# Patient Record
Sex: FEMALE | Race: WHITE | NOT HISPANIC OR LATINO | Employment: FULL TIME | URBAN - METROPOLITAN AREA
[De-identification: names, ages, dates, MRNs, and addresses within clinical notes are randomized per-mention and may not be internally consistent; named-entity substitution may affect disease eponyms.]

---

## 2017-01-04 ENCOUNTER — OFFICE VISIT (OUTPATIENT)
Dept: INTERNAL MEDICINE | Facility: CLINIC | Age: 62
End: 2017-01-04

## 2017-01-04 VITALS
OXYGEN SATURATION: 97 % | WEIGHT: 151.2 LBS | BODY MASS INDEX: 24.98 KG/M2 | SYSTOLIC BLOOD PRESSURE: 129 MMHG | DIASTOLIC BLOOD PRESSURE: 78 MMHG | HEART RATE: 72 BPM

## 2017-01-04 DIAGNOSIS — R05.9 COUGH: ICD-10-CM

## 2017-01-04 DIAGNOSIS — Z11.59 NEED FOR HEPATITIS C SCREENING TEST: Primary | ICD-10-CM

## 2017-01-04 RX ORDER — ALBUTEROL SULFATE 90 UG/1
2 AEROSOL, METERED RESPIRATORY (INHALATION) EVERY 6 HOURS PRN
Qty: 1 INHALER | Refills: 3 | Status: SHIPPED | OUTPATIENT
Start: 2017-01-04 | End: 2017-07-11

## 2017-01-04 ASSESSMENT — PAIN SCALES - GENERAL: PAINLEVEL: NO PAIN (0)

## 2017-01-04 NOTE — PROGRESS NOTES
"                     PRIMARY CARE CENTER       SUBJECTIVE:  Namita Hernandez is a 61 year old female with pmh of R sided breast cancer stage IA on tamoxifen, h/o benign thyroid nodule (biopsied)   following with Dr Alegria in onclogy who comes in for f/u with me today.  She is concerned about ongoing issues with a dry cough.  It is persistent and she wants to get this checked out.      Cough ongoing for one year and a half. Did have CT 4/2015 normal.  Cough is dry and irritating.  Rare sputum occ will feel a \"weakness in her chest.  When taking a deep breath will make her cough.  Other triggers include use of baby powder on chest, strong odors, pets.  Sometimes when lies down.  Nothing sees to make better.  Unsure what is going on with this.  No h/o asthma.  She does notice occ c/o hoarseness.  no weakness in voice.  No sinus sxs or drainage.  No c/o GERD sxs, no dysphagia.  She is not really endorsing SOB except when taking a really deep breath.  No changes in exercise tolerance.  She feels like that has been gradually improving.  Overall she feels like this part is better.  No pain or pressure in chest. No leg swelling.  No sneezing, itchy eyes.      ROS  A little more tired than in the past.   No weight loss.   No hemoptysis  No sputum production.     Medications and allergies reviewed by me today.     PMH  No h/o asthma  Does have some seasonal allergies.  Occ issues with pets (Dogs) with allergies.      FHx: father with COPD (emphysema) but he was a smoker.      Social:    Social History   Substance Use Topics     Smoking status: Former Smoker     Quit date: 01/01/1986     Smokeless tobacco: Never Used     Alcohol Use: Yes      Comment: 2 glasses of wine daily       Review Of Systems    10 point ROS of systems including Constitutional, Eyes, Respiratory, Cardiovascular, Pulmonary, Gastroenterology, Genitourinary, Integumentary, Musculoskeletal, Psychiatric were all negative except for pertinent positives noted in my " HPI.           OBJECTIVE:    /78 mmHg  Pulse 72  Wt 68.584 kg (151 lb 3.2 oz)  SpO2 97%  Breastfeeding? No   Wt Readings from Last 1 Encounters:   01/04/17 68.584 kg (151 lb 3.2 oz)       Constitutional: no distress, comfortable, pleasant   HEENT anicteric  PERRL  OP clear  Neck:  supple with full range of motion, no thyromegaly.   Nodes: no cervical, supraclavicular or axillary lad  Cardiovascular: regular rate and rhythm, normal S1 and S2, no murmurs, rubs or gallops,   Respiratory: Good air mvt.  Wheezing apically with forced expiration, otherwise no crackles.   Gastrointestinal: NT/ND  positive bowel sounds all four quadrants no hepatosplenomegaly, no masses   Ext:   no edema   ? Mild clubbing of digits clarissa.     CT of chest reviewed by me today from 4/15.      IMPRESSION:    1. Mild pectus excavatum.  2. 1 cm hypodense nodule right thyroid lobe. Recommend thyroid  ultrasound.     ASSESSMENT/PLAN:  Pt is a 61 year old female here for dry cough.  Ongoing for close to two years at this point.  CT of chest normal.  With apical inducible wheezes on examiantion ? Whether this could be a cough variant asthma.  Does have some typical triggers with cold, smells.  Rec PFTS with bronchidilator challenge, methachloline.  Trial of albuterol as well.    Pt agreeable with plan.   Namita was seen today for cough.    Diagnoses and all orders for this visit:    Need for hepatitis C screening test  -     Hepatitis C Screen Reflex to HCV RNA Quant and Genotype; Future    Cough  -     albuterol (PROAIR HFA/PROVENTIL HFA/VENTOLIN HFA) 108 (90 BASE) MCG/ACT Inhaler; Inhale 2 puffs into the lungs every 6 hours as needed for shortness of breath / dyspnea or wheezing (Patient taking differently: Inhale 2 puffs into the lungs every 6 hours as needed for shortness of breath / dyspnea or wheezing Will  today 1/5/17)  -     General PFT Lab (Please always keep checked); Future  -     Pulmonary Function Test; Future                 Ifeoma Amato MD

## 2017-01-04 NOTE — NURSING NOTE
Chief Complaint   Patient presents with     Cough     Patient states she has had a cough/ weakness in her chest x1 year.       Thelma Wu CMA at 2:55 PM on 1/4/2017.

## 2017-01-04 NOTE — PATIENT INSTRUCTIONS
Primary Care Center Medication Refill Request Information:  * Please contact your pharmacy regarding ANY request for medication refills.  ** [facility] Prescription Fax = [phone]  * Please allow 3 business days for routine medication refills.  * Please allow 5 business days for controlled substance medication refills.     Primary Care Center Test Result notification information:  *You will be notified with in 7-10 days of your appointment day regarding the results of your test.  If you are on MyChart you will be notified as soon as the provider has reviewed the results and signed off on them.    Pulmonary  [phone]

## 2017-01-04 NOTE — MR AVS SNAPSHOT
After Visit Summary   1/4/2017    Namita Hernandez    MRN: [id number]           Patient Information     Date Of Birth          1955        Visit Information        Provider Department      1/4/2017 2:55 PM Ifeoma Amato MD [facility]        Today's Diagnoses     Need for hepatitis C screening test    -  1     Cough           Care Instructions    Primary Care Center Medication Refill Request Information:  * Please contact your pharmacy regarding ANY request for medication refills.  ** [facility] Prescription Fax = [phone]  * Please allow 3 business days for routine medication refills.  * Please allow 5 business days for controlled substance medication refills.     Primary Care Center Test Result notification information:  *You will be notified with in 7-10 days of your appointment day regarding the results of your test.  If you are on MyChart you will be notified as soon as the provider has reviewed the results and signed off on them.    Pulmonary  [phone]          Follow-ups after your visit        Your next 10 appointments already scheduled     Jan 05, 2017  9:30 AM   LAB with [facility] ([facility])    [address] [place] [id number] [phone]           Patient must bring picture ID.  Patient should be prepared to give a urine specimen  Please do not eat 10-12 hours before your appointment if you are coming in fasting for labs on lipids, cholesterol, or glucose (sugar).  Pregnant women should follow their Care Team instructions. Water with medications is okay. Do not drink coffee or other fluids.   If you have concerns about taking  your medications, please ask at office or if scheduling via [employer], send a message by clicking on Secure Messaging, Message Your Care Team.            Jan 05, 2017 10:00 AM   (Arrive by 9:45 AM)   Return Visit with Jennifer Ty PA-C   [facility]  ([facility])    [address]  2nd Floor  [facility] [address]   [phone]            Jan 06, 2017  2:00 PM   FULL PULMONARY FUNCTION with  PFL C   [facility] Function Testing ([facility])    [address]  3rd Floor  [facility] [address]   [phone]            Mar 03, 2017  9:00 AM   Return Visit with Olena Luis MD   [facility] ([facility])    [facility]  1st Floor  [address] [address]   [phone]            Apr 03, 2017 10:00 AM   (Arrive by 9:45 AM)   Return Visit with Delphine Alegria MD   [facility] ([facility])    [address]  2nd Floor  [place] [address] [phone]              Future tests that were ordered for you today     Open Future Orders        Priority Expected Expires Ordered    General PFT Lab (Please always keep checked) Routine  1/4/2018 1/4/2017    Pulmonary Function Test Routine  1/4/2018 1/4/2017    Hepatitis C Screen Reflex to HCV RNA Quant and Genotype Routine 1/4/2017 1/4/2018 1/4/2017            Who to contact     Please call your clinic at [phone] to:    Ask questions about your health    Make or cancel appointments    Discuss your medicines    Learn about your test results    Speak to your doctor   If you have compliments or concerns about an experience at your clinic, or if you wish to file a complaint, please contact [facility] Patient Relations at [phone] or email us at [email or web address]         Additional Information About Your Visit        MyChart Information     [employer] gives you secure access to your electronic health record. If you see a primary care provider, you can also send messages to your care team and make appointments. If you have questions, please call your primary care clinic.  If you  do not have a primary care provider, please call [phone] and they will assist you.      [employer] is an electronic gateway that provides easy, online access to your medical records. With [employer], you can request a clinic appointment, read your test results, renew a prescription or communicate with your care team.     To access your existing account, please contact your [facility] or call [phone] for assistance.        Care EveryWhere ID     This is your Care EveryWhere ID. This could be used by other organizations to access your [facility] medical records  [id number]        Your Vitals Were     Pulse Pulse Oximetry Breastfeeding?             72 97% No          Blood Pressure from Last 3 Encounters:   01/04/17 129/78   10/03/16 142/85   08/05/16 132/74    Weight from Last 3 Encounters:   01/04/17 68.584 kg (151 lb 3.2 oz)   10/03/16 69.31 kg (152 lb 12.8 oz)   08/05/16 69.582 kg (153 lb 6.4 oz)                 Today's Medication Changes          These changes are accurate as of: 1/4/17  4:12 PM.  If you have any questions, ask your nurse or doctor.               Start taking these medicines.        Dose/Directions    albuterol 108 (90 BASE) MCG/ACT Inhaler   Commonly known as:  PROAIR HFA/PROVENTIL HFA/VENTOLIN HFA   Used for:  Cough   Started by:  Ifeoma Amato MD        Dose:  2 puff   Inhale 2 puffs into the lungs every 6 hours as needed for shortness of breath / dyspnea or wheezing   Quantity:  1 Inhaler   Refills:  3            Where to get your medicines      These medications were sent to [facility]/pharmacy #[id number] - [place], [place] - [address]  [address], [place] [address]     Phone:  [phone]    - albuterol 108 (90 BASE) MCG/ACT Inhaler             Primary Care Provider Office Phone # Fax #    Ifeoma Amato [phone]       [address] [address]        Thank you!     Thank you for choosing  [facility]  for your care. Our goal is always to provide you with excellent care. Hearing back from our patients is one way we can continue to improve our services. Please take a few minutes to complete the written survey that you may receive in the mail after your visit with us. Thank you!             Your Updated Medication List - Protect others around you: Learn how to safely use, store and throw away your medicines at www.disposemymeds.org.          This list is accurate as of: 1/4/17  4:12 PM.  Always use your most recent med list.                   Brand Name Dispense Instructions for use    albuterol 108 (90 BASE) MCG/ACT Inhaler    PROAIR HFA/PROVENTIL HFA/VENTOLIN HFA    1 Inhaler    Inhale 2 puffs into the lungs every 6 hours as needed for shortness of breath / dyspnea or wheezing       CALCIUM PO      Take 2 tablets by mouth daily       cholecalciferol 1000 UNIT tablet    vitamin D    30 tablet    Take by mouth daily       estradiol 10 MCG Tabs vaginal tablet    VAGIFEM    12 tablet    1 tablet three times weekly.       tamoxifen 20 MG tablet    NOLVADEX    90 tablet    Take 1 tablet (20 mg) by mouth daily       vitamin B complex with vitamin C Tabs tablet      Take 1 tablet by mouth daily       zolpidem 5 MG tablet    AMBIEN    60 tablet    Take 1 tablet (5 mg) by mouth nightly as needed for sleep

## 2017-01-05 ENCOUNTER — ONCOLOGY VISIT (OUTPATIENT)
Dept: ONCOLOGY | Facility: CLINIC | Age: 62
End: 2017-01-05
Attending: INTERNAL MEDICINE
Payer: COMMERCIAL

## 2017-01-05 VITALS
OXYGEN SATURATION: 98 % | DIASTOLIC BLOOD PRESSURE: 77 MMHG | WEIGHT: 151.1 LBS | SYSTOLIC BLOOD PRESSURE: 136 MMHG | RESPIRATION RATE: 16 BRPM | TEMPERATURE: 96.7 F | BODY MASS INDEX: 24.96 KG/M2 | HEART RATE: 72 BPM

## 2017-01-05 DIAGNOSIS — C50.911 MALIGNANT NEOPLASM OF RIGHT FEMALE BREAST, UNSPECIFIED SITE OF BREAST: Primary | ICD-10-CM

## 2017-01-05 DIAGNOSIS — Z11.59 NEED FOR HEPATITIS C SCREENING TEST: ICD-10-CM

## 2017-01-05 DIAGNOSIS — Z87.898 HISTORY OF CHRONIC COUGH: ICD-10-CM

## 2017-01-05 DIAGNOSIS — M81.0 OSTEOPOROSIS: ICD-10-CM

## 2017-01-05 LAB — HCV AB SERPL QL IA: NORMAL

## 2017-01-05 PROCEDURE — 99212 OFFICE O/P EST SF 10 MIN: CPT | Mod: ZF

## 2017-01-05 PROCEDURE — 99214 OFFICE O/P EST MOD 30 MIN: CPT | Mod: ZP | Performed by: PHYSICIAN ASSISTANT

## 2017-01-05 ASSESSMENT — PAIN SCALES - GENERAL: PAINLEVEL: NO PAIN (0)

## 2017-01-05 ASSESSMENT — ENCOUNTER SYMPTOMS
DYSPNEA ON EXERTION: 0
FATIGUE: 1
NIGHT SWEATS: 0
TASTE DISTURBANCE: 0
DECREASED APPETITE: 0
COUGH DISTURBING SLEEP: 0
HOARSE VOICE: 1
SNORES LOUDLY: 0
CHILLS: 0
FEVER: 0
POSTURAL DYSPNEA: 0
ALTERED TEMPERATURE REGULATION: 0
SINUS PAIN: 0
TROUBLE SWALLOWING: 0
COUGH: 1
NECK MASS: 0
SHORTNESS OF BREATH: 0
WHEEZING: 1
HALLUCINATIONS: 0
SORE THROAT: 0
SMELL DISTURBANCE: 0
WEIGHT GAIN: 0
INCREASED ENERGY: 0
SPUTUM PRODUCTION: 0
POLYPHAGIA: 0
WEIGHT LOSS: 0
RESPIRATORY PAIN: 0
SINUS CONGESTION: 0
HEMOPTYSIS: 0
POLYDIPSIA: 0

## 2017-01-05 NOTE — PROGRESS NOTES
"January 5, 2017    Namita returns today for followup of a stage I, T1b N0, ER/WY-positive and HER2-negative breast cancer.      HISTORY OF PRESENT ILLNESS:  Namita, \"Bridget" is a 61-year-old woman who comes in today for followup. Briefly, Namita underwent a mammogram and ultrasound.  Mammogram on 04/06/2015 suggested a 1.5 cm abnormal distortion confirmed by ultrasound.  By contrast mammography, this revealed an 18 mm area of abnormality at the 2 o'clock position involving her right breast.  Biopsy was recommended and confirmed an invasive ductal carcinoma, grade 1, ER/WY-positive and HER2-negative.  She underwent a lumpectomy and sentinel lymph node biopsy by Dr. Yoshi Oconnor.  Her final staging was a 6 mm tumor, T1b N0, ER/WY-positive and HER2-negative.  All margins were negative.      During Namita's workup, she was found to have a thyroid and parathyroid nodule.  She has an FNA performed of this that came back benign.      She completed her RTx (52.4 Gy in 20 sessions 6/31-7/27). She started tamoxifen in September 2015.      Ju feels well today. No new concerns. SHe did meet with Dr. Amato tomorrow for work-up of chornic, intermittent dry cough. This has been ongoing since before her breast surgery. A CT scan at that time showed no etiology. Since it has not gone away, she decided to get it worked up. There do seem to be environmental triggers. No GERD symptoms. No SOB, chest pain, or pleuritic pain. She has to cough when she inhales deeply sometimes. She will go days without symptoms and then it will come back for a few days. Presently without the cough. No leg swelling. Remains active without difficulty. She continues on vitamin D (she is on 5395-6803 IU daily) and calcium. She otherwise denies any new lumps/bumps, headaches, new vision changes, new pain, nausea, vomiting, bowel changes. She offers no further concerns today.     REVIEW OF SYSTEMS:  Her 10-point review of systems is otherwise negative.      PHYSICAL " EXAMINATION:   GENERAL:  She is well-appearing, in no apparent distress.   HEENT:  Exam reveals no icterus.  Oropharynx is clear.  There are no ulcers or lesions.   NECK:  Supple.  No cervical, supraclavicular or axillary adenopathy.   HEART:  Regular.   LUNGS:  Clear bilaterally.   ABDOMEN:  Soft, nontender and nondistended.   BREASTS:  Well-healed surgical scar in her right breast, right breast nipple contraction c/w left side, no mass or nodule palpated  SKIN: no rashes       ASSESSMENT AND PLAN:  This 61-year-old woman with a T1b N0, ER/AR-positive and HER2-negative invasive ductal carcinoma, grade 1, involving the right breast, status post lumpectomy and right breast RTx.    1.  Breast cancer.  Started tamoxifen Sept 2015. She is tolerating this well with occasional hot flashes. No signs of recurrence on exam today. She will follow-up with Dr. Alegria in 3 months. She will be due for mammogram in June 2017.  2. Osteoporosis: in her radius bone, L spine and neck showed osteopenia. Most recent DEXA 7/2015.  3. Thyroid nodule: seeing Dr. Rogers. She had biospy in November, consistent with benign nodule.  4. Chronic intermittent, dry cough: Ongoing since prior to breast surgery and not worsening. CT scan performed prior to surgery without any clear etiologies. She does think it is triggered by odors, pets, etc. No SOB or chest pain, no pleuritic pain, or swelling. She is seeing Dr. Amato for work-up and is getting PFTs tomorrow.       Jennifer Ty PA-C  Hematology, Oncology, and Transplant  [facility]  [address] [place] [id number]  [phone]

## 2017-01-05 NOTE — Clinical Note
"1/5/2017      RE: Namita Dodd [facility]  [address] [place] [address]       January 5, 2017    Namita returns today for followup of a stage I, T1b N0, ER/DE-positive and HER2-negative breast cancer.      HISTORY OF PRESENT ILLNESS:  Namita, \"Bridget" is a 61-year-old woman who comes in today for followup. Briefly, Namita underwent a mammogram and ultrasound.  Mammogram on 04/06/2015 suggested a 1.5 cm abnormal distortion confirmed by ultrasound.  By contrast mammography, this revealed an 18 mm area of abnormality at the 2 o'clock position involving her right breast.  Biopsy was recommended and confirmed an invasive ductal carcinoma, grade 1, ER/DE-positive and HER2-negative.  She underwent a lumpectomy and sentinel lymph node biopsy by Dr. Yoshi Oconnor.  Her final staging was a 6 mm tumor, T1b N0, ER/DE-positive and HER2-negative.  All margins were negative.      During Namita's workup, she was found to have a thyroid and parathyroid nodule.  She has an FNA performed of this that came back benign.      She completed her RTx (52.4 Gy in 20 sessions 6/31-7/27). She started tamoxifen in September 2015.      Ju feels well today. No new concerns. SHe did meet with Dr. Amato tomorrow for work-up of chornic, intermittent dry cough. This has been ongoing since before her breast surgery. A CT scan at that time showed no etiology. Since it has not gone away, she decided to get it worked up. There do seem to be environmental triggers. No GERD symptoms. No SOB, chest pain, or pleuritic pain. She has to cough when she inhales deeply sometimes. She will go days without symptoms and then it will come back for a few days. Presently without the cough. No leg swelling. Remains active without difficulty. She continues on vitamin D (she is on 0842-5059 IU daily) and calcium. She otherwise denies any new lumps/bumps, headaches, new vision changes, new pain, nausea, vomiting, bowel changes. She offers no further concerns today.     REVIEW OF " SYSTEMS:  Her 10-point review of systems is otherwise negative.      PHYSICAL EXAMINATION:   GENERAL:  She is well-appearing, in no apparent distress.   HEENT:  Exam reveals no icterus.  Oropharynx is clear.  There are no ulcers or lesions.   NECK:  Supple.  No cervical, supraclavicular or axillary adenopathy.   HEART:  Regular.   LUNGS:  Clear bilaterally.   ABDOMEN:  Soft, nontender and nondistended.   BREASTS:  Well-healed surgical scar in her right breast, right breast nipple contraction c/w left side, no mass or nodule palpated  SKIN: no rashes       ASSESSMENT AND PLAN:  This 61-year-old woman with a T1b N0, ER/NE-positive and HER2-negative invasive ductal carcinoma, grade 1, involving the right breast, status post lumpectomy and right breast RTx.    1.  Breast cancer.  Started tamoxifen Sept 2015. She is tolerating this well with occasional hot flashes. No signs of recurrence on exam today. She will follow-up with Dr. Alegria in 3 months. She will be due for mammogram in June 2017.  2. Osteoporosis: in her radius bone, L spine and neck showed osteopenia. Most recent DEXA 7/2015.  3. Thyroid nodule: seeing Dr. Rogers. She had biospy in November, consistent with benign nodule.  4. Chronic intermittent, dry cough: Ongoing since prior to breast surgery and not worsening. CT scan performed prior to surgery without any clear etiologies. She does think it is triggered by odors, pets, etc. No SOB or chest pain, no pleuritic pain, or swelling. She is seeing Dr. Amato for work-up and is getting PFTs tomorrow.       Jennifer Ty PA-C  Hematology, Oncology, and Transplant  [facility]  [address] [place] [address]  [phone]

## 2017-01-05 NOTE — NURSING NOTE
"Namita Hernandez is a 61 year old female who presents for:  Chief Complaint   Patient presents with     Oncology Clinic Visit     Return: Breast ca         Initial Vitals:  /77 mmHg  Pulse 72  Temp(Src) 96.7  F (35.9  C) (Tympanic)  Resp 16  Wt 68.539 kg (151 lb 1.6 oz)  SpO2 98% Estimated body mass index is 24.96 kg/(m^2) as calculated from the following:    Height as of 8/5/16: 1.657 m (5' 5.25\").    Weight as of this encounter: 68.539 kg (151 lb 1.6 oz).. There is no height on file to calculate BSA. BP completed using cuff size: regular  No Pain (0) No LMP recorded. Patient is postmenopausal. Allergies and medications reviewed.     Medications: Medication refills not needed today.  Pharmacy name entered into [employer]: [facility] #[id number] - [place], [place] - [address]    Comments: Patient received flu vaccine. See Immunizations.  Nicky Jacques CMA        6 minutes for nursing intake (face to face time)   Nicky Jacques CMA          "

## 2017-01-06 DIAGNOSIS — R05.9 COUGH: ICD-10-CM

## 2017-01-18 LAB
DLCOUNC-%PRED-PRE: 88 %
DLCOUNC-PRE: 19.6 ML/MIN/MMHG
DLCOUNC-PRED: 22.14 ML/MIN/MMHG
ERV-%PRED-PRE: 71 %
ERV-PRE: 0.62 L
ERV-PRED: 0.87 L
EXPTIME-PRE: 8.16 SEC
FEF2575-%PRED-PRE: 121 %
FEF2575-PRE: 2.8 L/SEC
FEF2575-PRED: 2.31 L/SEC
FEFMAX-%PRED-PRE: 115 %
FEFMAX-PRE: 7.51 L/SEC
FEFMAX-PRED: 6.48 L/SEC
FEV1-%PRED-PRE: 92 %
FEV1-PRE: 2.41 L
FEV1FEV6-PRE: 84 %
FEV1FEV6-PRED: 81 %
FEV1FVC-PRE: 84 %
FEV1FVC-PRED: 79 %
FEV1SVC-PRE: 83 %
FEV1SVC-PRED: 76 %
FIFMAX-PRE: 0.83 L/SEC
FRCPLETH-%PRED-PRE: 82 %
FRCPLETH-PRE: 2.3 L
FRCPLETH-PRED: 2.79 L
FVC-%PRED-PRE: 86 %
FVC-PRE: 2.86 L
FVC-PRED: 3.31 L
IC-%PRED-PRE: 89 %
IC-PRE: 2.29 L
IC-PRED: 2.57 L
RVPLETH-%PRED-PRE: 84 %
RVPLETH-PRE: 1.68 L
RVPLETH-PRED: 1.99 L
TLCPLETH-%PRED-PRE: 88 %
TLCPLETH-PRE: 4.6 L
TLCPLETH-PRED: 5.19 L
VA-%PRED-PRE: 80 %
VA-PRE: 4.29 L
VC-%PRED-PRE: 84 %
VC-PRE: 2.91 L
VC-PRED: 3.44 L

## 2017-02-14 DIAGNOSIS — C50.911 BREAST CANCER, RIGHT BREAST (H): ICD-10-CM

## 2017-02-14 RX ORDER — TAMOXIFEN CITRATE 20 MG/1
20 TABLET ORAL DAILY
Qty: 90 TABLET | Refills: 1 | Status: SHIPPED | OUTPATIENT
Start: 2017-02-14 | End: 2017-08-12

## 2017-02-14 NOTE — TELEPHONE ENCOUNTER
Tamoxifen 20 mg tablet      Last Written Prescription Date: 7/14/2016  Last Fill Quantity: 90,  # refills: 1   Last Office Visit with G, P or [facility] prescribing provider: 1/15/17                                             Refill request routed to Dr. Alegria.

## 2017-03-03 ENCOUNTER — OFFICE VISIT (OUTPATIENT)
Dept: RADIATION ONCOLOGY | Facility: CLINIC | Age: 62
End: 2017-03-03
Attending: RADIOLOGY
Payer: COMMERCIAL

## 2017-03-03 VITALS
DIASTOLIC BLOOD PRESSURE: 75 MMHG | SYSTOLIC BLOOD PRESSURE: 132 MMHG | HEART RATE: 71 BPM | OXYGEN SATURATION: 98 % | BODY MASS INDEX: 25.28 KG/M2 | WEIGHT: 153.1 LBS

## 2017-03-03 DIAGNOSIS — C50.911 MALIGNANT NEOPLASM OF RIGHT FEMALE BREAST, UNSPECIFIED SITE OF BREAST: Primary | ICD-10-CM

## 2017-03-03 PROCEDURE — 99212 OFFICE O/P EST SF 10 MIN: CPT | Performed by: RADIOLOGY

## 2017-03-03 ASSESSMENT — PAIN SCALES - GENERAL: PAINLEVEL: NO PAIN (0)

## 2017-03-03 NOTE — LETTER
3/3/2017       RE: Namita Hernandez  [address] [place] [address]     Dear Colleague,    Thank you for referring your patient, Namita Hernandez, to the [facility]. Please see a copy of my visit note below.    RADIATION ONCOLOGY FOLLOW-UP VISIT  DATE: 3/3/17    NAME: Namita Hernandez  MRN: [id number]  : 1955      DISEASE TREATED: Stage IA (pT1b N0 M0) invasive ductal carcinoma of the right breast, grade 1, ER/WI-positive, HER2 non amplified, s/p lumpectomy and SLN biopsy    RADIATION THERAPY DELIVERED: 5240 cGy in fractions (including 1000 cGy boost)    INTERVAL SINCE COMPLETION OF RT: Approximately 1.5 years since completion on 2015    SUBJECTIVE: Ms. Namita Hernandez is a 62 year old female lady with early stage cancer of the right breast status post lumpectomy and sentinel lymph node biopsy. She initially presented after palpating a lump in her inner upper quadrant of her right breast. Subsequent workup with mammogram and Us demonstrated a 1.3 x 1.5 x 1 cm solid hypoechoic mass in the 2:30 o'clock position 6 cm from the nipple as well as an 8 mm hypoechoic area 2.5 cm from the nipple. Both lesions were biopsied on that day with the tumor at 6 cm from nipple being positive for invasive ductal carcinoma, grade 1, ER/WI positive, HER-2 negative and the lesion 2.5 cm from nipple showing only atypical ductal hyperplasia, but no cancer. She then underwent a right sided lumpectomy and SLN biopsy on 2015. Pathology showed a 6 mm area of invasive ductal carcinoma surrounded by DCIS and LCIS. The invasive tumor was grade 1, and the closest invasive component was 0.25 cm from the margin and the closest DCIS was 0.25 cm from the margin. She had 0 out of 2 involved sentinel lymph nodes. She then saw Dr. Alegria and was started on Tamoxifen in July. Additionally, she underwent a course of adjuvant radiation therapy as outlined above. Overall, she tolerated her treatment course quite well with the  expected side effects of mild fatigue and skin reaction.     Since the completion of radiotherapy, Ms. Hernandez was started on Tamoxifen.  She has had a short interval when tamoxifen was stopped given the development of a UTI.  She most recently has surveillance on 6/14/16 with a diagnostic mammogram read as BI-RADS 2: benign findings. She has most recently seen Medical Oncology on 10/3/16 (Dr. Alegria) at which time she was recommended to continue tamoxifen.  She has also seen a plastic surgeon in consultation for possible improvement of sub-optimal cosmesis after lumpectomy.  She however has decided not to proceed with intervention at this time given that she does not believe the probability that procedure does produce satisfactory cosmesis is high enough to warrant the risk of operation.    Subjectively today the patient reports that she is doing well. We did discuss briefly a pain she has been having over her right TM joint for approximately 5 days which has resolved as of this morning.  After discussion, she does not wish for any additional workup in this regards. We also discussed some paroxysms of mild dyspnea which she has encountered for the past year.  She has been worked up for this and decided not to pursue additional testing or intervention. Her remaining 10 point ROS were negative.        PHYSICAL EXAM:  VITALS: /75 Pulse 71 Wt 69.4 kg (153 lb 1.6 oz) SpO2 98% BMI 25.28 kg/m2   GEN: Appears well, alert, oriented, and in NAD  HEENT: NCAT, EOMI, normal conjunctiva  CV:  warm and well-perfused  RESP: breathing comfortably on room air  BREAST: Status post right lumpectomy with well healed surgical scar. Noticeable asymmetric breasts with tissue deficit appreciated in the right breast in comparison to her left breast. There is moderate fibrosis of the right breast and right nipple is significantly contracted and pointed medially.  No masses or lumps palpated, no SCV or axillary LAD  SKIN: Normal color and  roquegor  NEURO: No focal deficits, normal gait  PSYCH: Appropriate mood and affect    IMPRESSION: Ms. Hernandez is a 62 year old female with stage IA invasive ductal carcinoma of the right breast status post lumpectomy and SLN biopsy followed by adjuvant radiation therapy and now continues on Tamoxifen. Overall, she is doing well and has recovered from the acute side effects associated with therapy. Cosmetically, there is noticeable asymmetry between the breasts which does cause Ms. Hernandez some distress.  She has been assessed by Plastic Surgery and understands that she may return for evaluation of cosmetic surgery    PLAN:  1. RTC in 1 year for routine followup  2. Continue close followup with Dr. Alegria in medical oncology as scheduled with monitoring imaging at her care  3. Continue Tamoxifen per Dr. Alegria    Ms. Hernandez was seen and discussed with staff, Dr. Luis.    Eber Luna MD  Radiation Oncology Resident, PGY-2  [facility]  Phone: [phone]    I saw and examined the patient with the resident.  I have reviewed and agree with the resident's note and plan of care.      Olena Luis MD      FOLLOW-UP VISIT    Patient Name: Namita Hernandez      : 1955     Age: 62 year old        ______________________________________________________________________________     Chief Complaint   Patient presents with     Cancer     Follow up for Breast ca: 5240 cGy completed 7/27/15     /75  Pulse 71  Wt 69.4 kg (153 lb 1.6 oz)  SpO2 98%  BMI 25.28 kg/m2     Date Radiation Completed: 7/25/15    Pain  Denies    Labs  Other Labs: No    Imaging  None          Other Appointments:     MD Name:  Appointment Date:    MD Name: Appointment Date:   MD Name: Appointment Date:   Other Appointment Notes:     Residual Radiation side effect: denies side effects     Additional Instructions:       Again, thank you for allowing me to participate in the care of your patient.      Sincerely,    Olena Luis  MD

## 2017-03-03 NOTE — PROGRESS NOTES
FOLLOW-UP VISIT    Patient Name: Namita Hernandez      : 1955     Age: 62 year old        ______________________________________________________________________________     Chief Complaint   Patient presents with     Cancer     Follow up for Breast ca: 5240 cGy completed 7/27/15     /75  Pulse 71  Wt 69.4 kg (153 lb 1.6 oz)  SpO2 98%  BMI 25.28 kg/m2     Date Radiation Completed: 7/25/15    Pain  Denies    Labs  Other Labs: No    Imaging  None          Other Appointments:     MD Name:  Appointment Date:    MD Name: Appointment Date:   MD Name: Appointment Date:   Other Appointment Notes:     Residual Radiation side effect: denies side effects     Additional Instructions:

## 2017-03-03 NOTE — MR AVS SNAPSHOT
After Visit Summary   3/3/2017    Namita Hernandez    MRN: [id number]           Patient Information     Date Of Birth          1955        Visit Information        Provider Department      3/3/2017 9:00 AM Olena Luis MD Rad[facility] Oncology Clinic        Today's Diagnoses     Malignant neoplasm of right female breast, unspecified site of breast (H)    -  1       Follow-ups after your visit        Your next 10 appointments already scheduled     Apr 03, 2017 10:00 AM CDT   (Arrive by 9:45 AM)   Return Visit with Delphine Alegria MD   [facility] ([facility])    [address] [address] [phone]              Who to contact     Please call your clinic at [phone] to:    Ask questions about your health    Make or cancel appointments    Discuss your medicines    Learn about your test results    Speak to your doctor   If you have compliments or concerns about an experience at your clinic, or if you wish to file a complaint, please contact [facility] Patient Relations at [phone] or email us at [email or web address]         Additional Information About Your Visit        MyChart Information     [employer] gives you secure access to your electronic health record. If you see a primary care provider, you can also send messages to your care team and make appointments. If you have questions, please call your primary care clinic.  If you do not have a primary care provider, please call [phone] and they will assist you.      [employer] is an electronic gateway that provides easy, online access to your medical records. With [employer], you can request a clinic appointment, read your test results, renew a prescription or communicate with your care team.     To access your existing account, please contact your [facility] or call [phone] for assistance.         Care EveryWhere ID     This is your Care EveryWhere ID. This could be used by other organizations to access your [place] medical records  [id number]        Your Vitals Were     Pulse Pulse Oximetry BMI (Body Mass Index)             71 98% 25.28 kg/m2          Blood Pressure from Last 3 Encounters:   03/03/17 132/75   01/05/17 136/77   01/04/17 129/78    Weight from Last 3 Encounters:   03/03/17 69.4 kg (153 lb 1.6 oz)   01/05/17 68.5 kg (151 lb 1.6 oz)   01/04/17 68.6 kg (151 lb 3.2 oz)              Today, you had the following     No orders found for display       Primary Care Provider Office Phone # Fax #    Ifeoma Amato [phone]       [address] [address]        Thank you!     Thank you for choosing [facility]  for your care. Our goal is always to provide you with excellent care. Hearing back from our patients is one way we can continue to improve our services. Please take a few minutes to complete the written survey that you may receive in the mail after your visit with us. Thank you!             Your Updated Medication List - Protect others around you: Learn how to safely use, store and throw away your medicines at www.disposemymeds.org.          This list is accurate as of: 3/3/17 11:59 PM.  Always use your most recent med list.                   Brand Name Dispense Instructions for use    albuterol 108 (90 BASE) MCG/ACT Inhaler    PROAIR HFA/PROVENTIL HFA/VENTOLIN HFA    1 Inhaler    Inhale 2 puffs into the lungs every 6 hours as needed for shortness of breath / dyspnea or wheezing       CALCIUM PO      Take 2-4 tablets by mouth daily       cholecalciferol 1000 UNIT tablet    vitamin D    30 tablet    Take by mouth daily       estradiol 10 MCG Tabs vaginal tablet    VAGIFEM    12 tablet    1 tablet three times weekly.       tamoxifen 20 MG tablet    NOLVADEX    90 tablet    Take 1 tablet (20 mg) by mouth daily        vitamin B complex with vitamin C Tabs tablet      Take 1 tablet by mouth daily       zolpidem 5 MG tablet    AMBIEN    60 tablet    Take 1 tablet (5 mg) by mouth nightly as needed for sleep

## 2017-03-03 NOTE — PROGRESS NOTES
RADIATION ONCOLOGY FOLLOW-UP VISIT  DATE: 3/3/17    NAME: Namita Hernandez  MRN: [id number]  : 1955      DISEASE TREATED: Stage IA (pT1b N0 M0) invasive ductal carcinoma of the right breast, grade 1, ER/CO-positive, HER2 non amplified, s/p lumpectomy and SLN biopsy    RADIATION THERAPY DELIVERED: 5240 cGy in fractions (including 1000 cGy boost)    INTERVAL SINCE COMPLETION OF RT: Approximately 1.5 years since completion on 2015    SUBJECTIVE: Ms. Namita Hernandez is a 62 year old female lady with early stage cancer of the right breast status post lumpectomy and sentinel lymph node biopsy. She initially presented after palpating a lump in her inner upper quadrant of her right breast. Subsequent workup with mammogram and Us demonstrated a 1.3 x 1.5 x 1 cm solid hypoechoic mass in the 2:30 o'clock position 6 cm from the nipple as well as an 8 mm hypoechoic area 2.5 cm from the nipple. Both lesions were biopsied on that day with the tumor at 6 cm from nipple being positive for invasive ductal carcinoma, grade 1, ER/CO positive, HER-2 negative and the lesion 2.5 cm from nipple showing only atypical ductal hyperplasia, but no cancer. She then underwent a right sided lumpectomy and SLN biopsy on 2015. Pathology showed a 6 mm area of invasive ductal carcinoma surrounded by DCIS and LCIS. The invasive tumor was grade 1, and the closest invasive component was 0.25 cm from the margin and the closest DCIS was 0.25 cm from the margin. She had 0 out of 2 involved sentinel lymph nodes. She then saw Dr. Alegria and was started on Tamoxifen in July. Additionally, she underwent a course of adjuvant radiation therapy as outlined above. Overall, she tolerated her treatment course quite well with the expected side effects of mild fatigue and skin reaction.     Since the completion of radiotherapy, Ms. Hernandez was started on Tamoxifen.  She has had a short interval when tamoxifen was stopped given the development of a UTI.  She most  recently has surveillance on 6/14/16 with a diagnostic mammogram read as BI-RADS 2: benign findings. She has most recently seen Medical Oncology on 10/3/16 (Dr. Alegria) at which time she was recommended to continue tamoxifen.  She has also seen a plastic surgeon in consultation for possible improvement of sub-optimal cosmesis after lumpectomy.  She however has decided not to proceed with intervention at this time given that she does not believe the probability that procedure does produce satisfactory cosmesis is high enough to warrant the risk of operation.    Subjectively today the patient reports that she is doing well. We did discuss briefly a pain she has been having over her right TM joint for approximately 5 days which has resolved as of this morning.  After discussion, she does not wish for any additional workup in this regards. We also discussed some paroxysms of mild dyspnea which she has encountered for the past year.  She has been worked up for this and decided not to pursue additional testing or intervention. Her remaining 10 point ROS were negative.        PHYSICAL EXAM:  VITALS: /75 Pulse 71 Wt 69.4 kg (153 lb 1.6 oz) SpO2 98% BMI 25.28 kg/m2   GEN: Appears well, alert, oriented, and in NAD  HEENT: NCAT, EOMI, normal conjunctiva  CV:  warm and well-perfused  RESP: breathing comfortably on room air  BREAST: Status post right lumpectomy with well healed surgical scar. Noticeable asymmetric breasts with tissue deficit appreciated in the right breast in comparison to her left breast. There is moderate fibrosis of the right breast and right nipple is significantly contracted and pointed medially.  No masses or lumps palpated, no SCV or axillary LAD  SKIN: Normal color and turgor  NEURO: No focal deficits, normal gait  PSYCH: Appropriate mood and affect    IMPRESSION: Ms. Hernandez is a 62 year old female with stage IA invasive ductal carcinoma of the right breast status post lumpectomy and SLN biopsy  followed by adjuvant radiation therapy and now continues on Tamoxifen. Overall, she is doing well and has recovered from the acute side effects associated with therapy. Cosmetically, there is noticeable asymmetry between the breasts which does cause Ms. Hernandez some distress.  She has been assessed by Plastic Surgery and understands that she may return for evaluation of cosmetic surgery    PLAN:  1. RTC in 1 year for routine followup  2. Continue close followup with Dr. Alegria in medical oncology as scheduled with monitoring imaging at her care  3. Continue Tamoxifen per Dr. Alegria    Ms. Hernandez was seen and discussed with staff, Dr. Luis.    Eber Luna MD  Radiation Oncology Resident, PGY-2  [facility]  Phone: [phone]    I saw and examined the patient with the resident.  I have reviewed and agree with the resident's note and plan of care.      Olena Luis MD

## 2017-04-03 ENCOUNTER — ONCOLOGY VISIT (OUTPATIENT)
Dept: ONCOLOGY | Facility: CLINIC | Age: 62
End: 2017-04-03
Attending: INTERNAL MEDICINE
Payer: COMMERCIAL

## 2017-04-03 VITALS
OXYGEN SATURATION: 96 % | BODY MASS INDEX: 25.41 KG/M2 | WEIGHT: 152.5 LBS | TEMPERATURE: 97.6 F | HEIGHT: 65 IN | DIASTOLIC BLOOD PRESSURE: 79 MMHG | SYSTOLIC BLOOD PRESSURE: 121 MMHG | RESPIRATION RATE: 12 BRPM | HEART RATE: 82 BPM

## 2017-04-03 DIAGNOSIS — C50.911 MALIGNANT NEOPLASM OF RIGHT FEMALE BREAST, UNSPECIFIED SITE OF BREAST: Primary | ICD-10-CM

## 2017-04-03 PROCEDURE — 99214 OFFICE O/P EST MOD 30 MIN: CPT | Mod: GC | Performed by: INTERNAL MEDICINE

## 2017-04-03 PROCEDURE — 99212 OFFICE O/P EST SF 10 MIN: CPT | Mod: ZF

## 2017-04-03 ASSESSMENT — PAIN SCALES - GENERAL: PAINLEVEL: NO PAIN (0)

## 2017-04-03 NOTE — PROGRESS NOTES
"MEDICAL ONCOLOGY FOLLOW UP VISIT    April 3, 2017    Namita returns today for followup of a stage I, T1b N0, ER/NH-positive and HER2-negative breast cancer.      HISTORY OF PRESENT ILLNESS: Namita is a 62-year-old woman who comes in today for followup for her breast cancer. Please see prior notes by Dr. Alegria for further detail (06/01/2015).  Briefly, Namita underwent a mammogram and ultrasound.  Mammogram on 04/06/2015 suggested a 1.5 cm abnormal distortion confirmed by ultrasound.  By contrast mammography, this revealed an 18 mm area of abnormality at the 2 o'clock position involving her right breast.  Biopsy was recommended and confirmed an invasive ductal carcinoma, grade 1, ER/NH-positive and HER2-negative.  She underwent a lumpectomy and sentinel lymph node biopsy by Dr. Yoshi Oconnor.  Her final staging was a 6 mm tumor, T1b N0, ER/NH-positive and HER2-negative.  All margins were negative. She completed her RTx (52.4 Gy in 20 sessions 6/31-7/27). She started Tamoxifen in 09/2015.     INTERVAL HISTORY: She returns today in follow up and feels quite well. She remains on Tamoxifen and tolerates it without major problems. She has some hot flashes but these are manageable. She has no joint aches or pains. She has no vaginal bleeding. Her mood is good. She has had no fevers, chills, or chest pain. She exercises regularly, she took up running but would get neck pain when she ran, it is not present any more. Her appetite is good and her energy level is good. Since her last visit she did see a plastic surgeon to discuss potential fat grafting of her right breast.    REVIEW OF SYSTEMS:  Her 10-point review of systems is otherwise negative.      PHYSICAL EXAMINATION: /79  Pulse 82  Temp 97.6  F (36.4  C) (Oral)  Resp 12  Ht 1.663 m (5' 5.47\")  Wt 69.2 kg (152 lb 8 oz)  SpO2 96%  BMI 25.01 kg/m2    GENERAL:  She is well-appearing, in no apparent distress.   HEENT:  Exam reveals no icterus.  Oropharynx is clear.  " There are no ulcers or lesions.   NECK:  Supple.  No cervical, supraclavicular or axillary adenopathy.   HEART:  Regular.   LUNGS:  Clear bilaterally.   ABDOMEN:  Soft, nontender and nondistended.   BREASTS:  Well-healed surgical scar in her right breast with underlying scar tissue, right breast nipple contraction c/w left side, no mass or nodule palpated  SKIN: no rashes      No new labs today.    ASSESSMENT AND PLAN:  This 62-year-old woman with a T1b N0, ER/RI-positive and HER2-negative invasive ductal carcinoma, grade 1, involving the right breast, status post lumpectomy and right breast RTx. She is now on Tamoxifen for adjuvant endocrine therapy.    1.  Breast cancer.  [id number], stage IA, start tamoxifen Sept 2015 which she is on due to the results of her DEXA scan showing osteoporosis. We will plan to continue Tamoxifen at its current dosing. We will repeat her DEXA in June to see how her bone health is and consider switching to an aromatase inhibotor. She is due for a mammogram in June as well.      2. Osteoporosis: in her radius bone, L spine and neck showed osteopenia. We will repeat DEXA this summer. We will consider bisphosphonate at if we switch her to an aromatase inhibitor.     RTC in 3 months.     Patient seen and discussed with Dr. Alegria.    Moises Sheffield MD  Heme/Onc Fellow    Pt was seen and evaluated by me.  Tolerating tamoxifen well. Continue current plan.    Delphine Alegria MD

## 2017-04-03 NOTE — LETTER
4/3/2017       RE: Namita Hernandez  [address] [place] [address]     Dear Colleague,    Thank you for referring your patient, Namita Hernandez, to the [facility]. Please see a copy of my visit note below.    MEDICAL ONCOLOGY FOLLOW UP VISIT    April 3, 2017    Namita returns today for followup of a stage I, T1b N0, ER/IN-positive and HER2-negative breast cancer.      HISTORY OF PRESENT ILLNESS: Namita is a 62-year-old woman who comes in today for followup for her breast cancer. Please see prior notes by Dr. Alegria for further detail (06/01/2015).  Briefly, Namita underwent a mammogram and ultrasound.  Mammogram on 04/06/2015 suggested a 1.5 cm abnormal distortion confirmed by ultrasound.  By contrast mammography, this revealed an 18 mm area of abnormality at the 2 o'clock position involving her right breast.  Biopsy was recommended and confirmed an invasive ductal carcinoma, grade 1, ER/IN-positive and HER2-negative.  She underwent a lumpectomy and sentinel lymph node biopsy by Dr. Yohsi Oconnor.  Her final staging was a 6 mm tumor, T1b N0, ER/IN-positive and HER2-negative.  All margins were negative. She completed her RTx (52.4 Gy in 20 sessions 6/31-7/27). She started Tamoxifen in 09/2015.     INTERVAL HISTORY: She returns today in follow up and feels quite well. She remains on Tamoxifen and tolerates it without major problems. She has some hot flashes but these are manageable. She has no joint aches or pains. She has no vaginal bleeding. Her mood is good. She has had no fevers, chills, or chest pain. She exercises regularly, she took up running but would get neck pain when she ran, it is not present any more. Her appetite is good and her energy level is good. Since her last visit she did see a plastic surgeon to discuss potential fat grafting of her right breast.    REVIEW OF SYSTEMS:  Her 10-point review of systems is otherwise negative.      PHYSICAL EXAMINATION: /79  Pulse 82  Temp  "97.6  F (36.4  C) (Oral)  Resp 12  Ht 1.663 m (5' 5.47\")  Wt 69.2 kg (152 lb 8 oz)  SpO2 96%  BMI 25.01 kg/m2    GENERAL:  She is well-appearing, in no apparent distress.   HEENT:  Exam reveals no icterus.  Oropharynx is clear.  There are no ulcers or lesions.   NECK:  Supple.  No cervical, supraclavicular or axillary adenopathy.   HEART:  Regular.   LUNGS:  Clear bilaterally.   ABDOMEN:  Soft, nontender and nondistended.   BREASTS:  Well-healed surgical scar in her right breast with underlying scar tissue, right breast nipple contraction c/w left side, no mass or nodule palpated  SKIN: no rashes      No new labs today.    ASSESSMENT AND PLAN:  This 62-year-old woman with a T1b N0, ER/WI-positive and HER2-negative invasive ductal carcinoma, grade 1, involving the right breast, status post lumpectomy and right breast RTx. She is now on Tamoxifen for adjuvant endocrine therapy.    1.  Breast cancer.  [id number], stage IA, start tamoxifen Sept 2015 which she is on due to the results of her DEXA scan showing osteoporosis. We will plan to continue Tamoxifen at its current dosing. We will repeat her DEXA in June to see how her bone health is and consider switching to an aromatase inhibotor. She is due for a mammogram in June as well.      2. Osteoporosis: in her radius bone, L spine and neck showed osteopenia. We will repeat DEXA this summer. We will consider bisphosphonate at if we switch her to an aromatase inhibitor.     RTC in 3 months.     Patient seen and discussed with Dr. Alegria.    Moises Sheffield MD  Heme/Onc Fellow    Pt was seen and evaluated by me.  Tolerating tamoxifen well. Continue current plan.    Delphine Alegria MD      "

## 2017-04-03 NOTE — NURSING NOTE
"Namita Hernandez is a 62 year old female who presents for:  Chief Complaint   Patient presents with     Oncology Clinic Visit     regular breast ca checkup        Initial Vitals:  There were no vitals taken for this visit. Estimated body mass index is 25.28 kg/(m^2) as calculated from the following:    Height as of 8/5/16: 1.657 m (5' 5.25\").    Weight as of 3/3/17: 69.4 kg (153 lb 1.6 oz).. There is no height or weight on file to calculate BSA. BP completed using cuff size: regular  Data Unavailable No LMP recorded. Allergies and medications reviewed.     Medications: Medication refills not needed today.  Pharmacy name entered into [facility]: [id number] - [place], [place] - [address]    Comments: pt denies pain    7 minutes for nursing intake (face to face time)   Ileana Patel CMA        "

## 2017-04-03 NOTE — MR AVS SNAPSHOT
After Visit Summary   4/3/2017    Namita Hernandez    MRN: [id number]           Patient Information     Date Of Birth          1955        Visit Information        Provider Department      4/3/2017 10:00 AM Delphine Alegria MD [facility]         Follow-ups after your visit        Your next 10 appointments already scheduled     Jul 11, 2017 10:15 AM CDT   (Arrive by 10:00 AM)   MA SCREENING BILATERAL W/ ROBBIE with [id number]   [facility] ([facility])    [address] [place] [id number] [phone]           Do not use any powder, lotion or deodorant under your arms or on your breast. If you do, we will ask you to remove it before your exam.  Wear comfortable, two-piece clothing.  If you have any allergies, tell your care team.  Bring any previous mammograms from other facilities or have them mailed to the breast center.            Jul 11, 2017 11:00 AM CDT   (Arrive by 10:45 AM)   Return Visit with Jennifer Ty PA-C   [facility] ([facility])    [address] [place] [address] [phone]              Who to contact     If you have questions or need follow up information about today's clinic visit or your schedule please contact [facility] directly at [phone].  Normal or non-critical lab and imaging results will be communicated to you by MyChart, letter or phone within 4 business days after the clinic has received the results. If you do not hear from us within 7 days, please contact the clinic through MyChart or phone. If you have a critical or abnormal lab result, we will notify you by phone as soon as possible.  Submit refill requests through [employer] or call your pharmacy and they will forward the refill request to us. Please allow 3 business days for your refill to be completed.          Additional  "Information About Your Visit        MyChart Information     [email or web address] Application gives you secure access to your electronic health record. If you see a primary care provider, you can also send messages to your care team and make appointments. If you have questions, please call your primary care clinic.  If you do not have a primary care provider, please call [phone] and they will assist you.        Care EveryWhere ID     This is your Care EveryWhere ID. This could be used by other organizations to access your [place] medical records  [id number]        Your Vitals Were     Pulse Temperature Respirations Height Pulse Oximetry BMI (Body Mass Index)    82 97.6  F (36.4  C) (Oral) 12 1.663 m (5' 5.47\") 96% 25.01 kg/m2       Blood Pressure from Last 3 Encounters:   04/03/17 121/79   03/03/17 132/75   01/05/17 136/77    Weight from Last 3 Encounters:   04/03/17 69.2 kg (152 lb 8 oz)   03/03/17 69.4 kg (153 lb 1.6 oz)   01/05/17 68.5 kg (151 lb 1.6 oz)              Today, you had the following     No orders found for display       Primary Care Provider Office Phone # Fax #    Ifeoma Amato [phone]       [address] [address]        Thank you!     Thank you for choosing [facility]  for your care. Our goal is always to provide you with excellent care. Hearing back from our patients is one way we can continue to improve our services. Please take a few minutes to complete the written survey that you may receive in the mail after your visit with us. Thank you!             Your Updated Medication List - Protect others around you: Learn how to safely use, store and throw away your medicines at www.disposemymeds.org.          This list is accurate as of: 4/3/17 11:02 AM.  Always use your most recent med list.                   Brand Name Dispense Instructions for use    albuterol 108 (90 BASE) MCG/ACT Inhaler    PROAIR HFA/PROVENTIL HFA/VENTOLIN HFA "    1 Inhaler    Inhale 2 puffs into the lungs every 6 hours as needed for shortness of breath / dyspnea or wheezing       CALCIUM PO      Take 2-4 tablets by mouth daily       cholecalciferol 1000 UNIT tablet    vitamin D    30 tablet    Take by mouth daily       estradiol 10 MCG Tabs vaginal tablet    VAGIFEM    12 tablet    1 tablet three times weekly.       tamoxifen 20 MG tablet    NOLVADEX    90 tablet    Take 1 tablet (20 mg) by mouth daily       vitamin B complex with vitamin C Tabs tablet      Take 1 tablet by mouth daily       zolpidem 5 MG tablet    AMBIEN    60 tablet    Take 1 tablet (5 mg) by mouth nightly as needed for sleep

## 2017-04-13 DIAGNOSIS — M81.0 SENILE OSTEOPOROSIS: ICD-10-CM

## 2017-04-13 DIAGNOSIS — C50.911 MALIGNANT NEOPLASM OF RIGHT FEMALE BREAST, UNSPECIFIED SITE OF BREAST: Primary | ICD-10-CM

## 2017-04-13 DIAGNOSIS — C50.911 MALIGNANT NEOPLASM OF RIGHT FEMALE BREAST (H): ICD-10-CM

## 2017-07-07 ASSESSMENT — ENCOUNTER SYMPTOMS
MUSCLE WEAKNESS: 0
HEMATURIA: 0
MUSCLE CRAMPS: 0
DIFFICULTY URINATING: 0
NECK PAIN: 0
MYALGIAS: 0
STIFFNESS: 0
ARTHRALGIAS: 0
BACK PAIN: 1
JOINT SWELLING: 0
DYSURIA: 0
FLANK PAIN: 0

## 2017-07-10 NOTE — PROGRESS NOTES
"MEDICAL ONCOLOGY FOLLOW UP VISIT  July 11, 2017    Namita returns today for followup of a stage I, T1b N0, ER/VA-positive and HER2-negative breast cancer.      HISTORY OF PRESENT ILLNESS: Namita is a 62-year-old woman who comes in today for followup for her breast cancer. Please see prior notes by Dr. Alegria for further detail (06/01/2015).  Briefly, Namita underwent a mammogram and ultrasound.  Mammogram on 04/06/2015 suggested a 1.5 cm abnormal distortion confirmed by ultrasound.  By contrast mammography, this revealed an 18 mm area of abnormality at the 2 o'clock position involving her right breast.  Biopsy was recommended and confirmed an invasive ductal carcinoma, grade 1, ER/VA-positive and HER2-negative.  She underwent a lumpectomy and sentinel lymph node biopsy by Dr. Yoshi Oconnor.  Her final staging was a 6 mm tumor, T1b N0, ER/VA-positive and HER2-negative.  All margins were negative. She completed her RTx (52.4 Gy in 20 sessions 6/31-7/27). She started Tamoxifen in 09/2015.     INTERVAL HISTORY: *Ju is feeling overall well. Gradually over the last few months, she has felt that the right-sided scar tissue on her chest wall has been more \"puffy\". She bought some new bras, and these now feel really tight. She thinks the tightness is worsening the problem. She hasn't had any arm swelling or pain. No local pain. She otherwise feels really well. She is exercising and seeing a  for several hours a week, focusing on strength training. Doing calcium and vitamin D. Eating well. No fevers, chills, nausea, vomiting, headaches, vision changes, abdominal pain, bleeding, or leg swelling.    REVIEW OF SYSTEMS:  Her 10-point review of systems is otherwise negative.      PHYSICAL EXAMINATION: /81  Pulse 60  Temp 97.6  F (36.4  C) (Oral)  Wt 70.6 kg (155 lb 9.6 oz)  SpO2 98%  BMI 25.52 kg/m2    GENERAL:  She is well-appearing, in no apparent distress.   HEENT:  Exam reveals no icterus.  Oropharynx is " clear.  There are no ulcers or lesions.   NECK:  Supple.  No cervical, supraclavicular or axillary adenopathy.   HEART:  Regular.   LUNGS:  Clear bilaterally.   ABDOMEN:  Soft, nontender and nondistended.   BREASTS:  Well-healed surgical scar in her right breast with underlying scar tissue, right breast nipple contraction c/w left side, no mass or nodule palpated  SKIN: no rashes      No new labs today.    ASSESSMENT AND PLAN:  This 62-year-old woman with a T1b N0, ER/VA-positive and HER2-negative invasive ductal carcinoma, grade 1, involving the right breast, status post lumpectomy and right breast RTx. She is now on Tamoxifen for adjuvant endocrine therapy.    1.  Breast cancer.  [id number], stage IA, start tamoxifen Sept 2015 in setting of osteoporosis on DEXA at that time. Repeat DEXA in process today, from prelim readings, it appears the L spine osteopenia is stable, hips are normal density. For now, we will plan to continue Tamoxifen at its current dosing. Ju is happy to continue on Tamoxifen at this time.     2. Right breast fullness: located in area of scar tissue on lateral chest wall. Diagnostic mammo performed with US, both unremarkable. This started in the setting of switching to new bras, which do feel quite tight to her. Exam with scar tissue on right side, which she has had in the past. She will trial new bras. If this continues despite the change, will refer to lymphedema therapy.    2. Osteoporosis: in her radius bone, L spine and neck showed osteopenia. Repeat DEXA in progress today, prelim showing osteopenia in lumbar spine but normal bone density in the hips. We will consider bisphosphonate at if we switch her to an aromatase inhibitor. Continue strength exercises, calcium, vitamin D.    RTC in 3 months.     Jennifer Ty PA-C

## 2017-07-11 ENCOUNTER — ONCOLOGY VISIT (OUTPATIENT)
Dept: ONCOLOGY | Facility: CLINIC | Age: 62
End: 2017-07-11
Attending: PHYSICIAN ASSISTANT
Payer: COMMERCIAL

## 2017-07-11 VITALS
DIASTOLIC BLOOD PRESSURE: 81 MMHG | TEMPERATURE: 97.6 F | HEART RATE: 60 BPM | WEIGHT: 155.6 LBS | OXYGEN SATURATION: 98 % | BODY MASS INDEX: 25.52 KG/M2 | SYSTOLIC BLOOD PRESSURE: 138 MMHG

## 2017-07-11 DIAGNOSIS — C50.911 MALIGNANT NEOPLASM OF RIGHT FEMALE BREAST, UNSPECIFIED ESTROGEN RECEPTOR STATUS, UNSPECIFIED SITE OF BREAST (H): Primary | ICD-10-CM

## 2017-07-11 PROCEDURE — 40000114 ZZH STATISTIC NO CHARGE CLINIC VISIT

## 2017-07-11 PROCEDURE — 99213 OFFICE O/P EST LOW 20 MIN: CPT | Mod: 25 | Performed by: PHYSICIAN ASSISTANT

## 2017-07-11 ASSESSMENT — PAIN SCALES - GENERAL: PAINLEVEL: NO PAIN (0)

## 2017-07-11 NOTE — MR AVS SNAPSHOT
After Visit Summary   7/11/2017    Namita Hernandez    MRN: [id number]           Patient Information     Date Of Birth          1955        Visit Information        Provider Department      7/11/2017 11:00 AM Jennifer Ty PA-C [facility]        Today's Diagnoses     Malignant neoplasm of right female breast, unspecified estrogen receptor status, unspecified site of breast (H)    -  1       Follow-ups after your visit        Who to contact     If you have questions or need follow up information about today's clinic visit or your schedule please contact [facility] directly at [phone].  Normal or non-critical lab and imaging results will be communicated to you by FTB[employer], letter or phone within 4 business days after the clinic has received the results. If you do not hear from us within 7 days, please contact the clinic through FTB[employer] or phone. If you have a critical or abnormal lab result, we will notify you by phone as soon as possible.  Submit refill requests through [employer] or call your pharmacy and they will forward the refill request to us. Please allow 3 business days for your refill to be completed.          Additional Information About Your Visit        MyChart Information     [employer] gives you secure access to your electronic health record. If you see a primary care provider, you can also send messages to your care team and make appointments. If you have questions, please call your primary care clinic.  If you do not have a primary care provider, please call [phone] and they will assist you.        Care EveryWhere ID     This is your Care EveryWhere ID. This could be used by other organizations to access your [place] medical records  [id number]        Your Vitals Were     Pulse Temperature Pulse Oximetry BMI (Body Mass Index)          60 97.6  F (36.4  C) (Oral) 98% 25.52 kg/m2         Blood Pressure from Last 3 Encounters:    07/11/17 138/81   04/03/17 121/79   03/03/17 132/75    Weight from Last 3 Encounters:   07/11/17 70.6 kg (155 lb 9.6 oz)   04/03/17 69.2 kg (152 lb 8 oz)   03/03/17 69.4 kg (153 lb 1.6 oz)               Primary Care Provider Office Phone # Fax #    Ifeoma Tamela Amato [phone]       [address] [address]        Equal Access to Services     [facility] : Hadii aissatou marks hadasho Soomaali, waaxda luqadaha, qaybta kaalmada adetalib, chico guzman . So [facility] [phone].    ATENCIÓN: Si habla español, tiene a isidro disposición servicios gratuitos de asistencia lingüística. LlSelect [facility]ty [facility] [phone].    We comply with applicable federal civil rights laws and [place] laws. We do not discriminate on the basis of race, color, national origin, age, disability sex, sexual orientation or gender identity.            Thank you!     Thank you for choosing [facility]  for your care. Our goal is always to provide you with excellent care. Hearing back from our patients is one way we can continue to improve our services. Please take a few minutes to complete the written survey that you may receive in the mail after your visit with us. Thank you!             Your Updated Medication List - Protect others around you: Learn how to safely use, store and throw away your medicines at www.disposemymeds.org.          This list is accurate as of: 7/11/17 11:51 AM.  Always use your most recent med list.                   Brand Name Dispense Instructions for use Diagnosis    CALCIUM PO      Take 2-4 tablets by mouth daily        cholecalciferol 1000 UNIT tablet    vitamin D    30 tablet    Take by mouth daily        estradiol 10 MCG Tabs vaginal tablet    VAGIFEM    12 tablet    1 tablet three times weekly.    Malignant neoplasm of right female breast, unspecified site of breast, Osteopenia       tamoxifen 20 MG tablet    NOLVADEX    90 tablet     Take 1 tablet (20 mg) by mouth daily    Breast cancer, right breast (H)       vitamin B complex with vitamin C Tabs tablet      Take 1 tablet by mouth daily        zolpidem 5 MG tablet    AMBIEN    60 tablet    Take 1 tablet (5 mg) by mouth nightly as needed for sleep    Other insomnia

## 2017-07-11 NOTE — LETTER
"7/11/2017      RE: Namita Dodd [facility]  [address] [place] [address]       MEDICAL ONCOLOGY FOLLOW UP VISIT  July 11, 2017    Namita returns today for followup of a stage I, T1b N0, ER/KY-positive and HER2-negative breast cancer.      HISTORY OF PRESENT ILLNESS: Namita is a 62-year-old woman who comes in today for followup for her breast cancer. Please see prior notes by Dr. Alegria for further detail (06/01/2015).  Briefly, Namita underwent a mammogram and ultrasound.  Mammogram on 04/06/2015 suggested a 1.5 cm abnormal distortion confirmed by ultrasound.  By contrast mammography, this revealed an 18 mm area of abnormality at the 2 o'clock position involving her right breast.  Biopsy was recommended and confirmed an invasive ductal carcinoma, grade 1, ER/KY-positive and HER2-negative.  She underwent a lumpectomy and sentinel lymph node biopsy by Dr. Yoshi Oconnor.  Her final staging was a 6 mm tumor, T1b N0, ER/KY-positive and HER2-negative.  All margins were negative. She completed her RTx (52.4 Gy in 20 sessions 6/31-7/27). She started Tamoxifen in 09/2015.     INTERVAL HISTORY: *Ju is feeling overall well. Gradually over the last few months, she has felt that the right-sided scar tissue on her chest wall has been more \"puffy\". She bought some new bras, and these now feel really tight. She thinks the tightness is worsening the problem. She hasn't had any arm swelling or pain. No local pain. She otherwise feels really well. She is exercising and seeing a  for several hours a week, focusing on strength training. Doing calcium and vitamin D. Eating well. No fevers, chills, nausea, vomiting, headaches, vision changes, abdominal pain, bleeding, or leg swelling.    REVIEW OF SYSTEMS:  Her 10-point review of systems is otherwise negative.      PHYSICAL EXAMINATION: /81  Pulse 60  Temp 97.6  F (36.4  C) (Oral)  Wt 70.6 kg (155 lb 9.6 oz)  SpO2 98%  BMI 25.52 kg/m2    GENERAL:  She is " well-appearing, in no apparent distress.   HEENT:  Exam reveals no icterus.  Oropharynx is clear.  There are no ulcers or lesions.   NECK:  Supple.  No cervical, supraclavicular or axillary adenopathy.   HEART:  Regular.   LUNGS:  Clear bilaterally.   ABDOMEN:  Soft, nontender and nondistended.   BREASTS:  Well-healed surgical scar in her right breast with underlying scar tissue, right breast nipple contraction c/w left side, no mass or nodule palpated  SKIN: no rashes      No new labs today.    ASSESSMENT AND PLAN:  This 62-year-old woman with a T1b N0, ER/AZ-positive and HER2-negative invasive ductal carcinoma, grade 1, involving the right breast, status post lumpectomy and right breast RTx. She is now on Tamoxifen for adjuvant endocrine therapy.    1.  Breast cancer.  [id number], stage IA, start tamoxifen Sept 2015 in setting of osteoporosis on DEXA at that time. Repeat DEXA in process today, from prelim readings, it appears the L spine osteopenia is stable, hips are normal density. For now, we will plan to continue Tamoxifen at its current dosing. Ju is happy to continue on Tamoxifen at this time.     2. Right breast fullness: located in area of scar tissue on lateral chest wall. Diagnostic mammo performed with US, both unremarkable. This started in the setting of switching to new bras, which do feel quite tight to her. Exam with scar tissue on right side, which she has had in the past. She will trial new bras. If this continues despite the change, will refer to lymphedema therapy.    2. Osteoporosis: in her radius bone, L spine and neck showed osteopenia. Repeat DEXA in progress today, prelim showing osteopenia in lumbar spine but normal bone density in the hips. We will consider bisphosphonate at if we switch her to an aromatase inhibitor. Continue strength exercises, calcium, vitamin D.    RTC in 3 months.     Jennifer Ty PA-C

## 2017-07-14 DIAGNOSIS — C50.911 MALIGNANT NEOPLASM OF RIGHT FEMALE BREAST, UNSPECIFIED ESTROGEN RECEPTOR STATUS, UNSPECIFIED SITE OF BREAST (H): Primary | ICD-10-CM

## 2017-07-14 DIAGNOSIS — M85.80 OSTEOPENIA: ICD-10-CM

## 2017-07-18 RX ORDER — ESTRADIOL 10 UG/1
INSERT VAGINAL
Qty: 12 TABLET | Refills: 11 | Status: SHIPPED | OUTPATIENT
Start: 2017-07-18 | End: 2018-06-29

## 2017-08-12 DIAGNOSIS — C50.911 BREAST CANCER, RIGHT BREAST (H): ICD-10-CM

## 2017-08-14 RX ORDER — TAMOXIFEN CITRATE 20 MG/1
TABLET ORAL
Qty: 90 TABLET | Refills: 3 | Status: SHIPPED | OUTPATIENT
Start: 2017-08-14 | End: 2017-12-26

## 2017-08-17 ENCOUNTER — OFFICE VISIT (OUTPATIENT)
Dept: INTERNAL MEDICINE | Facility: CLINIC | Age: 62
End: 2017-08-17

## 2017-08-17 VITALS
DIASTOLIC BLOOD PRESSURE: 72 MMHG | RESPIRATION RATE: 16 BRPM | WEIGHT: 152.6 LBS | BODY MASS INDEX: 25.03 KG/M2 | HEART RATE: 85 BPM | SYSTOLIC BLOOD PRESSURE: 115 MMHG

## 2017-08-17 DIAGNOSIS — M54.50 ACUTE LOW BACK PAIN WITHOUT SCIATICA, UNSPECIFIED BACK PAIN LATERALITY: Primary | ICD-10-CM

## 2017-08-17 DIAGNOSIS — E04.2 MULTIPLE THYROID NODULES: ICD-10-CM

## 2017-08-17 DIAGNOSIS — C50.911 MALIGNANT NEOPLASM OF RIGHT FEMALE BREAST, UNSPECIFIED ESTROGEN RECEPTOR STATUS, UNSPECIFIED SITE OF BREAST (H): ICD-10-CM

## 2017-08-17 DIAGNOSIS — M81.0 SENILE OSTEOPOROSIS: ICD-10-CM

## 2017-08-17 DIAGNOSIS — R20.8 SKIN PAIN: ICD-10-CM

## 2017-08-17 DIAGNOSIS — Z86.0100 HISTORY OF COLONIC POLYPS: ICD-10-CM

## 2017-08-17 ASSESSMENT — PAIN SCALES - GENERAL: PAINLEVEL: NO PAIN (0)

## 2017-08-17 NOTE — NURSING NOTE
Chief Complaint   Patient presents with     Back Pain     pt states she has been having lower back and neck pain, getting better, was diagnosed with osteoporisis     Zena Naqvi CMA at 1:48 PM on 8/17/2017.

## 2017-08-17 NOTE — PATIENT INSTRUCTIONS
[facility] Medication Refill Request Information:  * Please contact your pharmacy regarding ANY request for medication refills.  ** [facility] Prescription Fax = [phone]  * Please allow 3 business days for routine medication refills.  * Please allow 5 business days for controlled substance medication refills.     [facility] Test Result notification information:  *You will be notified with in 7-10 days of your appointment day regarding the results of your test.  If you are on MyChart you will be notified as soon as the provider has reviewed the results and signed off on them.    [facility] [phone]

## 2017-08-17 NOTE — PROGRESS NOTES
PRIMARY CARE CENTER       SUBJECTIVE:  Namita Hernandez is a 62 year old female who comes in for evaluation of low back pain. Ju was previously seen by Dr. Amato. She has a past medical history that includes breast cancer of the right breast, status post mastectomy and currently on tamoxifen therapy. She also has a history of multiple thyroid nodules.    Regarding her back pain, Ju notes that she has been having pain over the last several weeks. She notes it would get worse after sleeping overnight. It would get better as she walked and moved throughout the day. She was also having pain in her neck as well. She recently had a diagnosis of osteoporosis, and is concerned that this is perhaps related. However the pain has been getting better.    Regarding her history of osteoporosis, she had a DEXA scan done in 2015, that showed an T score of -3.3 and her wrist. This DEXA scan was done as her oncologist had wanted to switch her from tamoxifen to an aromatase inhibitor. However with the result of osteoporosis, this switch in medications was not made. She had a repeat DEXA scan done a couple weeks ago, and would like to review the results with me today.    Finally she has had a sore spot on her right abdomen. She notes that it's the skin that feels sore. She is states it may be feels as though it's a sunburn sensation. She denies any known trauma to this area. She also feels there might have been a little bit of swelling in this area. There is no rash over the area. She also feels that this is starting to get a little bit better. When we mention the possibility of injury, she does not that she's been taking care of her mother's delacruz retriever recently, and has been wrestling with it on a regular basis to play.    Past Medical History:   Diagnosis Date     Breast cancer (H) 4/2015     Colon polyps      Hypovitaminosis D      Kidney stone 2010     Multiple thyroid nodules 2015    right dominant 1.3 cm;  "benign cytology 5/15     Osteoporosis 7/15    lowest T-score -3.2 33% radius     Pancreatic divisum      Recurrent UTI     since menopause (age 47) 3 x per year         Medications and allergies reviewed by me today.     ROS:   Constitutional, HEENT, cardiovascular, pulmonary, gi and gu systems are negative, except as otherwise noted.      OBJECTIVE:  /72  Pulse 85  Resp 16  Wt 69.2 kg (152 lb 9.6 oz)  BMI 25.03 kg/m2   Wt Readings from Last 1 Encounters:   08/17/17 69.2 kg (152 lb 9.6 oz)     General: Pleasant female, in no apparent distress  Skin: 2\" x 1\" patch on right upper quadrant of abdomen with subtle subcutaneous swelling, no rash or vesicles noted, perhaps a tinge of yellow discoloration  Neuro: Alert and oriented ×3, no focal deficits. Gait normal.     ASSESSMENT/PLAN:    Namita was seen today for back pain.    Diagnoses and all orders for this visit:    Acute low back pain without sciatica, unspecified back pain laterality  Resolving. Suspect musculoskeletal in nature. Unlikely to be related to osteoporosis.    Skin pain  Discussed that this is likely a resolving ecchymosis, from trauma. However if she were to develop a vesicular rash, advised her to call the clinic, as he would then need to start her on Valtrex for possible shingles. However with the fact that this is getting better, I do not suspect this to be shingles.    Multiple thyroid nodules  Has been seen in endocrine clinic and had negative biopsies. Due for follow-up in endocrine clinic in 2019.    Senile osteoporosis  T score at wrist was -3.3 and 2015. Her repeat DEXA scan actually showed increase in bone density in her hips. She will continue on tamoxifen at this time, and we'll not start any bisphosphonate therapy. She continues to remain active with her lifestyle.    Malignant neoplasm of right female breast, unspecified estrogen receptor status, unspecified site of breast (H)  Continue to follow with Dr. Alegria    History of " colonic polyps  Due for repeat colonoscopy in 2021    Pt should return to clinic for f/u with me in PRN      Maritza Lowe MD  08/17/17

## 2017-08-17 NOTE — MR AVS SNAPSHOT
After Visit Summary   8/17/2017    Namita Hernandez    MRN: [id number]           Patient Information     Date Of Birth          1955        Visit Information        Provider Department      8/17/2017 2:00 PM Maritza Reyes MD [facility]        Care Instructions    Primary Care Center Medication Refill Request Information:  * Please contact your pharmacy regarding ANY request for medication refills.  ** [facility] Prescription Fax = [phone]  * Please allow 3 business days for routine medication refills.  * Please allow 5 business days for controlled substance medication refills.     Primary Care Center Test Result notification information:  *You will be notified with in 7-10 days of your appointment day regarding the results of your test.  If you are on MyChart you will be notified as soon as the provider has reviewed the results and signed off on them.    [facility] [phone]             Follow-ups after your visit        Your next 10 appointments already scheduled     Nov 10, 2017  9:00 AM CST   (Arrive by 8:45 AM)   Return Visit with Delphine Alegria MD   [facility] ([facility])    [address] [place] [address] [phone]              Who to contact     Please call your clinic at [phone] to:    Ask questions about your health    Make or cancel appointments    Discuss your medicines    Learn about your test results    Speak to your doctor   If you have compliments or concerns about an experience at your clinic, or if you wish to file a complaint, please contact [facility] Patient Relations at [phone] or email us at [email or web address]         Additional Information About Your Visit        MyChart Information     [employer] gives you secure access to your electronic health record. If you see a primary care provider, you can also send  messages to your care team and make appointments. If you have questions, please call your primary care clinic.  If you do not have a primary care provider, please call [phone] and they will assist you.      [employer] is an electronic gateway that provides easy, online access to your medical records. With [employer], you can request a clinic appointment, read your test results, renew a prescription or communicate with your care team.     To access your existing account, please contact your [facility] or call [phone] for assistance.        Care EveryWhere ID     This is your Care EveryWhere ID. This could be used by other organizations to access your [facility] medical records  [id number]        Your Vitals Were     Pulse Respirations BMI (Body Mass Index)             85 16 25.03 kg/m2          Blood Pressure from Last 3 Encounters:   08/17/17 115/72   07/11/17 138/81   04/03/17 121/79    Weight from Last 3 Encounters:   08/17/17 69.2 kg (152 lb 9.6 oz)   07/11/17 70.6 kg (155 lb 9.6 oz)   04/03/17 69.2 kg (152 lb 8 oz)              Today, you had the following     No orders found for display       Primary Care Provider Office Phone # Fax #    Ifeoma Amato [phone]       [address] [address]        Equal Access to Services     ELENA HASTINGS : Hadii aissatou marks hadasho Soeliel, waaxda luqadaha, qaybta kaalmada braden, chico goldsmith. So [facility] [phone].    ATENCIÓN: Si habla español, tiene a isidro disposición servicios gratuitos de asistencia lingüística. Llame al [phone].    We comply with applicable federal civil rights laws and [place] laws. We do not discriminate on the basis of race, color, national origin, age, disability sex, sexual orientation or gender identity.            Thank you!     Thank you for choosing [facility]  for your care. Our goal is always to provide you with  excellent care. Hearing back from our patients is one way we can continue to improve our services. Please take a few minutes to complete the written survey that you may receive in the mail after your visit with us. Thank you!             Your Updated Medication List - Protect others around you: Learn how to safely use, store and throw away your medicines at www.disposemymeds.org.          This list is accurate as of: 8/17/17  2:16 PM.  Always use your most recent med list.                   Brand Name Dispense Instructions for use Diagnosis    CALCIUM PO      Take 2-4 tablets by mouth daily        cholecalciferol 1000 UNIT tablet    vitamin D    30 tablet    Take by mouth daily        estradiol 10 MCG Tabs vaginal tablet    VAGIFEM    12 tablet    1 tablet three times weekly.    Osteopenia       tamoxifen 20 MG tablet    NOLVADEX    90 tablet    TAKE 1 TABLET (20 MG) BY MOUTH DAILY    Breast cancer, right breast (H)       vitamin B complex with vitamin C Tabs tablet      Take 1 tablet by mouth daily        zolpidem 5 MG tablet    AMBIEN    60 tablet    Take 1 tablet (5 mg) by mouth nightly as needed for sleep    Other insomnia

## 2017-11-10 ENCOUNTER — ONCOLOGY VISIT (OUTPATIENT)
Dept: ONCOLOGY | Facility: CLINIC | Age: 62
End: 2017-11-10
Attending: INTERNAL MEDICINE
Payer: COMMERCIAL

## 2017-11-10 VITALS
DIASTOLIC BLOOD PRESSURE: 77 MMHG | WEIGHT: 153.7 LBS | SYSTOLIC BLOOD PRESSURE: 137 MMHG | TEMPERATURE: 98.4 F | RESPIRATION RATE: 16 BRPM | BODY MASS INDEX: 25.61 KG/M2 | OXYGEN SATURATION: 99 % | HEART RATE: 74 BPM | HEIGHT: 65 IN

## 2017-11-10 DIAGNOSIS — E04.2 MULTIPLE THYROID NODULES: ICD-10-CM

## 2017-11-10 DIAGNOSIS — Z17.0 MALIGNANT NEOPLASM OF RIGHT BREAST IN FEMALE, ESTROGEN RECEPTOR POSITIVE, UNSPECIFIED SITE OF BREAST (H): ICD-10-CM

## 2017-11-10 DIAGNOSIS — Z17.0 MALIGNANT NEOPLASM OF RIGHT BREAST IN FEMALE, ESTROGEN RECEPTOR POSITIVE, UNSPECIFIED SITE OF BREAST (H): Primary | ICD-10-CM

## 2017-11-10 DIAGNOSIS — C50.911 MALIGNANT NEOPLASM OF RIGHT BREAST IN FEMALE, ESTROGEN RECEPTOR POSITIVE, UNSPECIFIED SITE OF BREAST (H): Primary | ICD-10-CM

## 2017-11-10 DIAGNOSIS — C50.911 MALIGNANT NEOPLASM OF RIGHT BREAST IN FEMALE, ESTROGEN RECEPTOR POSITIVE, UNSPECIFIED SITE OF BREAST (H): ICD-10-CM

## 2017-11-10 LAB
ALBUMIN SERPL-MCNC: 4 G/DL (ref 3.4–5)
ALP SERPL-CCNC: 66 U/L (ref 40–150)
ALT SERPL W P-5'-P-CCNC: 28 U/L (ref 0–50)
ANION GAP SERPL CALCULATED.3IONS-SCNC: 5 MMOL/L (ref 3–14)
AST SERPL W P-5'-P-CCNC: 18 U/L (ref 0–45)
BASOPHILS # BLD AUTO: 0.1 10E9/L (ref 0–0.2)
BASOPHILS NFR BLD AUTO: 1.5 %
BILIRUB SERPL-MCNC: 0.7 MG/DL (ref 0.2–1.3)
BUN SERPL-MCNC: 16 MG/DL (ref 7–30)
CALCIUM SERPL-MCNC: 9 MG/DL (ref 8.5–10.1)
CHLORIDE SERPL-SCNC: 106 MMOL/L (ref 94–109)
CO2 SERPL-SCNC: 29 MMOL/L (ref 20–32)
CREAT SERPL-MCNC: 0.69 MG/DL (ref 0.52–1.04)
DIFFERENTIAL METHOD BLD: NORMAL
EOSINOPHIL # BLD AUTO: 0.1 10E9/L (ref 0–0.7)
EOSINOPHIL NFR BLD AUTO: 1.7 %
ERYTHROCYTE [DISTWIDTH] IN BLOOD BY AUTOMATED COUNT: 12.2 % (ref 10–15)
GFR SERPL CREATININE-BSD FRML MDRD: 86 ML/MIN/1.7M2
GLUCOSE SERPL-MCNC: 98 MG/DL (ref 70–99)
HCT VFR BLD AUTO: 40.1 % (ref 35–47)
HGB BLD-MCNC: 13.1 G/DL (ref 11.7–15.7)
IMM GRANULOCYTES # BLD: 0 10E9/L (ref 0–0.4)
IMM GRANULOCYTES NFR BLD: 0.4 %
LYMPHOCYTES # BLD AUTO: 1.1 10E9/L (ref 0.8–5.3)
LYMPHOCYTES NFR BLD AUTO: 23.2 %
MCH RBC QN AUTO: 32.3 PG (ref 26.5–33)
MCHC RBC AUTO-ENTMCNC: 32.7 G/DL (ref 31.5–36.5)
MCV RBC AUTO: 99 FL (ref 78–100)
MONOCYTES # BLD AUTO: 0.4 10E9/L (ref 0–1.3)
MONOCYTES NFR BLD AUTO: 8.2 %
NEUTROPHILS # BLD AUTO: 3 10E9/L (ref 1.6–8.3)
NEUTROPHILS NFR BLD AUTO: 65 %
NRBC # BLD AUTO: 0 10*3/UL
NRBC BLD AUTO-RTO: 0 /100
PLATELET # BLD AUTO: 162 10E9/L (ref 150–450)
POTASSIUM SERPL-SCNC: 4.2 MMOL/L (ref 3.4–5.3)
PROT SERPL-MCNC: 7.1 G/DL (ref 6.8–8.8)
RBC # BLD AUTO: 4.06 10E12/L (ref 3.8–5.2)
SODIUM SERPL-SCNC: 141 MMOL/L (ref 133–144)
TSH SERPL DL<=0.005 MIU/L-ACNC: 3.66 MU/L (ref 0.4–4)
WBC # BLD AUTO: 4.7 10E9/L (ref 4–11)

## 2017-11-10 PROCEDURE — 99213 OFFICE O/P EST LOW 20 MIN: CPT | Mod: ZF

## 2017-11-10 PROCEDURE — G0008 ADMIN INFLUENZA VIRUS VAC: HCPCS

## 2017-11-10 PROCEDURE — 90686 IIV4 VACC NO PRSV 0.5 ML IM: CPT | Mod: ZF | Performed by: INTERNAL MEDICINE

## 2017-11-10 PROCEDURE — 99213 OFFICE O/P EST LOW 20 MIN: CPT

## 2017-11-10 PROCEDURE — 80053 COMPREHEN METABOLIC PANEL: CPT | Performed by: INTERNAL MEDICINE

## 2017-11-10 PROCEDURE — 84443 ASSAY THYROID STIM HORMONE: CPT | Performed by: INTERNAL MEDICINE

## 2017-11-10 PROCEDURE — 25000128 H RX IP 250 OP 636: Mod: ZF | Performed by: INTERNAL MEDICINE

## 2017-11-10 PROCEDURE — 85025 COMPLETE CBC W/AUTO DIFF WBC: CPT | Performed by: INTERNAL MEDICINE

## 2017-11-10 PROCEDURE — 36415 COLL VENOUS BLD VENIPUNCTURE: CPT | Performed by: INTERNAL MEDICINE

## 2017-11-10 PROCEDURE — 99214 OFFICE O/P EST MOD 30 MIN: CPT | Mod: ZP | Performed by: INTERNAL MEDICINE

## 2017-11-10 RX ADMIN — INFLUENZA A VIRUS A/MICHIGAN/45/2015 X-275 (H1N1) ANTIGEN (FORMALDEHYDE INACTIVATED), INFLUENZA A VIRUS A/HONG KONG/4801/2014 X-263B (H3N2) ANTIGEN (FORMALDEHYDE INACTIVATED), INFLUENZA B VIRUS B/PHUKET/3073/2013 ANTIGEN (FORMALDEHYDE INACTIVATED), AND INFLUENZA B VIRUS B/BRISBANE/60/2008 ANTIGEN (FORMALDEHYDE INACTIVATED) 0.5 ML: 15; 15; 15; 15 INJECTION, SUSPENSION INTRAMUSCULAR at 09:53

## 2017-11-10 ASSESSMENT — PAIN SCALES - GENERAL: PAINLEVEL: NO PAIN (1)

## 2017-11-10 NOTE — NURSING NOTE
.            Namita Hernandez      1.  Has the patient received the information for the influenza vaccine? YES    2.  Does the patient have any of the following contraindications?     Allergy to eggs? No     Allergic reaction to previous influenza vaccines? No     Any other problems to previous influenza vaccines? No     Paralyzed by Guillain-Killdeer syndrome? No     Currently pregnant? NO     Current moderate or severe illness? No     Allergy to contact lens solution? No    3.  The vaccine has been administered in the usual fashion and the patient was instructed to wait 20 minutes before leaving the building in the event of an allergic reaction: YES    Vaccination given by Candelaria Coronado MA  .  Recorded by Candelaria Coronado      .Flu injection given to patient in left deltoid.  Patient tolerated well.     Candelaria Coronado MA

## 2017-11-10 NOTE — PROGRESS NOTES
MEDICAL ONCOLOGY FOLLOW UP VISIT    November 10, 2017    Namita returns today for followup of a stage I, T1b N0, ER/WV-positive and HER2-negative breast cancer.      HISTORY OF PRESENT ILLNESS: Namita is a 62-year-old woman who comes in today for followup for her breast cancer. Please see prior notes by Dr. Alegria for further detail (06/01/2015).  Briefly, Namita underwent a mammogram and ultrasound.  Mammogram on 04/06/2015 suggested a 1.5 cm abnormal distortion confirmed by ultrasound.  By contrast mammography, this revealed an 18 mm area of abnormality at the 2 o'clock position involving her right breast.  Biopsy was recommended and confirmed an invasive ductal carcinoma, grade 1, ER/WV-positive and HER2-negative.  She underwent a lumpectomy and sentinel lymph node biopsy by Dr. Yoshi Oconnor.  Her final staging was a 6 mm tumor, T1b N0, ER/WV-positive and HER2-negative.  All margins were negative. She completed her RTx (52.4 Gy in 20 sessions 6/31-7/27). She started Tamoxifen in 09/2015.     INTERVAL HISTORY: She returns today in follow up and feels quite well. In returning today, Namita says overall that she is feeling well.  She remains on tamoxifen.  She has noticed that overall she is tolerating the tamoxifen well.  She has occasional hot flashes.  She has no arthralgias or myalgias.  She reports urinary frequency.  She has no vaginal bleeding and no vaginal discharge.  Her mood is good.  She has no fevers or chills, chest pain or shortness of breath.       She does reports some fullness across her right breast and particularly under her right axilla.  She complained of this in July at the time when she was evaluated and was sent for a mammogram and ultrasound which were both unremarkable.  She says this is persistent.  She feels like she has needed a larger bra size and feels that her bra pushes in on this area and is wondering what this is related to.       Her 10-point review of systems is otherwise negative.        "  PHYSICAL EXAMINATION: /77  Pulse 74  Temp 98.4  F (36.9  C) (Oral)  Resp 16  Ht 1.663 m (5' 5.47\")  Wt 69.7 kg (153 lb 11.2 oz)  SpO2 99%  BMI 25.21 kg/m2    GENERAL:  She is well-appearing, in no apparent distress.   HEENT:  Exam reveals no icterus.  Oropharynx is clear.  There are no ulcers or lesions.   NECK:  Supple.  No cervical, supraclavicular or axillary adenopathy.   HEART:  Regular.   LUNGS:  Clear bilaterally.   ABDOMEN:  Soft, nontender and nondistended.   BREASTS:  Well-healed surgical scar in her right breast with underlying scar tissue, right breast nipple contraction c/w left side, no mass or nodule palpated  SKIN: no rashes      No new labs today.    ASSESSMENT AND PLAN:  This 62-year-old woman with a T1b N0, ER/HI-positive and HER2-negative invasive ductal carcinoma, grade 1, involving the right breast, status post lumpectomy and right breast RTx. She is now on Tamoxifen for adjuvant endocrine therapy.    1.  Breast cancer.  [id number], stage IA, start tamoxifen Sept 2015.Overall, Namita is tolerating her tamoxifen without any difficulty.  She is going to continue on this for at least 5 years.  We discussed that we will reevaluate next year whether to switch her to an aromatase inhibitor.  We will obtain a DEXA scan in 2018 to reevaluate.      Right chest fullness:  On examination she has no nodules or masses.  She had a normal mammogram and ultrasound in July.  Her symptoms are unchanged since then.  I discussed with her that she may have some edema from her surgery and radiation.  I recommended a referral to Lymphedema.  If this does not improve, I advised the patient to call us, in which case we could discuss further imaging.      Osteoporosis in the radius bone, left spine and neck:  We will plan to repeat her bone density scan next summer.  At this time, she will remain on tamoxifen.       I encouraged her to see her primary care physician annually.      Delphine Alegria MD  "

## 2017-11-10 NOTE — MR AVS SNAPSHOT
"              After Visit Summary   11/10/2017    Namita Dodd [facility]    MRN: [id number]           Patient Information     Date Of Birth          1955        Visit Information        Provider Department      11/10/2017 9:00 AM Delphine Alegria MD [facility]        Today's Diagnoses     Malignant neoplasm of right breast in female, estrogen receptor positive, unspecified site of breast (H)    -  1    Multiple thyroid nodules           Follow-ups after your visit        Additional Services     LYMPHEDEMA THERAPY REFERRAL       *This therapy referral will be filtered to a centralized scheduling office at [facility] and the patient will receive a call to schedule an appointment at a [place] location most convenient for them. *   If you have not heard from the scheduling office within 2 business days, please call [phone] for all locations, with the exception of [place], please call [phone].     Treatment: PT or OT Evaluation & Treatment  Special Instructions: R chest wall edema s/p lumpectomy and radiation  PT/OT Treatment Diagnosis: Lymphedema    Please be aware that coverage of these services is subject to the terms and limitations of your health insurance plan.  Call member services at your health plan with any benefit or coverage questions.      **Note to Provider:  If you are referring outside of [place] for the therapy appointment, please list the name of the location in the \"special instructions\" above, print the referral and give to the patient to schedule the appointment.                  Future tests that were ordered for you today     Open Future Orders        Priority Expected Expires Ordered    Comprehensive metabolic panel Routine 11/10/2017 11/10/2018 11/10/2017    TSH with free T4 reflex Routine 11/10/2017 11/10/2018 11/10/2017    *CBC with platelets differential Routine 11/10/2017 11/10/2018 11/10/2017            Who to contact     If you have " "questions or need follow up information about today's clinic visit or your schedule please contact [facility] directly at [phone].  Normal or non-critical lab and imaging results will be communicated to you by MyChart, letter or phone within 4 business days after the clinic has received the results. If you do not hear from us within 7 days, please contact the clinic through [employer] or phone. If you have a critical or abnormal lab result, we will notify you by phone as soon as possible.  Submit refill requests through [employer] or call your pharmacy and they will forward the refill request to us. Please allow 3 business days for your refill to be completed.          Additional Information About Your Visit        [employer] Information     [employer] gives you secure access to your electronic health record. If you see a primary care provider, you can also send messages to your care team and make appointments. If you have questions, please call your primary care clinic.  If you do not have a primary care provider, please call [phone] and they will assist you.        Care EveryWhere ID     This is your Care EveryWhere ID. This could be used by other organizations to access your [facility] medical records  [id number]        Your Vitals Were     Pulse Temperature Respirations Height Pulse Oximetry BMI (Body Mass Index)    74 98.4  F (36.9  C) (Oral) 16 1.663 m (5' 5.47\") 99% 25.21 kg/m2       Blood Pressure from Last 3 Encounters:   11/10/17 137/77   08/17/17 115/72   07/11/17 138/81    Weight from Last 3 Encounters:   11/10/17 69.7 kg (153 lb 11.2 oz)   08/17/17 69.2 kg (152 lb 9.6 oz)   07/11/17 70.6 kg (155 lb 9.6 oz)              We Performed the Following     LYMPHEDEMA THERAPY REFERRAL        Primary Care Provider Office Phone # Fax #    Maritza Lowe [phone]       [address] [place] [address]  [facility] [address]        Equal Access to Services     ELENA HASTINGS" AH: Alize solizgarydayanara Ritaali, warakeshda luqadaha, qaybta karoberto carlos granttalibjassibandar lisa sharjoaquina graysonchloetamela goldsmith. So [facility] [phone].    ATENCIÓN: Si habla thang, tiene a isidro disposición servicios gratuitos de asistencia lingüística. Llame al [phone].    We comply with applicable federal civil rights laws and [place] laws. We do not discriminate on the basis of race, color, national origin, age, disability, sex, sexual orientation, or gender identity.            Thank you!     Thank you for choosing [facility]  for your care. Our goal is always to provide you with excellent care. Hearing back from our patients is one way we can continue to improve our services. Please take a few minutes to complete the written survey that you may receive in the mail after your visit with us. Thank you!             Your Updated Medication List - Protect others around you: Learn how to safely use, store and throw away your medicines at www.disposemymeds.org.          This list is accurate as of: 11/10/17  9:57 AM.  Always use your most recent med list.                   Brand Name Dispense Instructions for use Diagnosis    CALCIUM PO      Take 2-4 tablets by mouth daily        cholecalciferol 1000 UNIT tablet    vitamin D3    30 tablet    Take by mouth daily        estradiol 10 MCG Tabs vaginal tablet    VAGIFEM    12 tablet    1 tablet three times weekly.    Osteopenia       tamoxifen 20 MG tablet    NOLVADEX    90 tablet    TAKE 1 TABLET (20 MG) BY MOUTH DAILY    Breast cancer, right breast (H)       vitamin B complex with vitamin C Tabs tablet      Take 1 tablet by mouth daily        zolpidem 5 MG tablet    AMBIEN    60 tablet    Take 1 tablet (5 mg) by mouth nightly as needed for sleep    Other insomnia

## 2017-11-10 NOTE — NURSING NOTE
"Oncology Rooming Note    November 10, 2017 9:18 AM   Namita Hernandez is a 62 year old female who presents for:    Chief Complaint   Patient presents with     Oncology Clinic Visit     Breast CA- 3 month      Initial Vitals: /77  Pulse 74  Temp 98.4  F (36.9  C) (Oral)  Resp 16  Ht 1.663 m (5' 5.47\")  Wt 69.7 kg (153 lb 11.2 oz)  SpO2 99%  BMI 25.21 kg/m2 Estimated body mass index is 25.21 kg/(m^2) as calculated from the following:    Height as of this encounter: 1.663 m (5' 5.47\").    Weight as of this encounter: 69.7 kg (153 lb 11.2 oz). Body surface area is 1.79 meters squared.  No Pain (1) Comment: right breast    No LMP recorded.  Allergies reviewed: Yes  Medications reviewed: Yes    Medications: Medication refills not needed today.  Pharmacy name entered into [employer]: [id number] - [place], [place] - [address]    Clinical concerns: Patient feels that her bust is getting bigger and has had right breast pain and swelling. Patient would like flu injection today.     10 minutes for nursing intake (face to face time)     Joan Kinsey LPN                 "

## 2017-11-10 NOTE — LETTER
11/10/2017       RE: Namita Hernandez  [address] [place] [address]     Dear Colleague,    Thank you for referring your patient, Namita Hernandez, to the [facility]. Please see a copy of my visit note below.    MEDICAL ONCOLOGY FOLLOW UP VISIT    November 10, 2017    Namita returns today for followup of a stage I, T1b N0, ER/OK-positive and HER2-negative breast cancer.      HISTORY OF PRESENT ILLNESS: Namita is a 62-year-old woman who comes in today for followup for her breast cancer. Please see prior notes by Dr. Alegria for further detail (06/01/2015).  Briefly, Namita underwent a mammogram and ultrasound.  Mammogram on 04/06/2015 suggested a 1.5 cm abnormal distortion confirmed by ultrasound.  By contrast mammography, this revealed an 18 mm area of abnormality at the 2 o'clock position involving her right breast.  Biopsy was recommended and confirmed an invasive ductal carcinoma, grade 1, ER/OK-positive and HER2-negative.  She underwent a lumpectomy and sentinel lymph node biopsy by Dr. Yoshi Oconnor.  Her final staging was a 6 mm tumor, T1b N0, ER/OK-positive and HER2-negative.  All margins were negative. She completed her RTx (52.4 Gy in 20 sessions 6/31-7/27). She started Tamoxifen in 09/2015.     INTERVAL HISTORY: She returns today in follow up and feels quite well. In returning today, Namita says overall that she is feeling well.  She remains on tamoxifen.  She has noticed that overall she is tolerating the tamoxifen well.  She has occasional hot flashes.  She has no arthralgias or myalgias.  She reports urinary frequency.  She has no vaginal bleeding and no vaginal discharge.  Her mood is good.  She has no fevers or chills, chest pain or shortness of breath.       She does reports some fullness across her right breast and particularly under her right axilla.  She complained of this in July at the time when she was evaluated and was sent for a mammogram and ultrasound which were both  "unremarkable.  She says this is persistent.  She feels like she has needed a larger bra size and feels that her bra pushes in on this area and is wondering what this is related to.       Her 10-point review of systems is otherwise negative.         PHYSICAL EXAMINATION: /77  Pulse 74  Temp 98.4  F (36.9  C) (Oral)  Resp 16  Ht 1.663 m (5' 5.47\")  Wt 69.7 kg (153 lb 11.2 oz)  SpO2 99%  BMI 25.21 kg/m2    GENERAL:  She is well-appearing, in no apparent distress.   HEENT:  Exam reveals no icterus.  Oropharynx is clear.  There are no ulcers or lesions.   NECK:  Supple.  No cervical, supraclavicular or axillary adenopathy.   HEART:  Regular.   LUNGS:  Clear bilaterally.   ABDOMEN:  Soft, nontender and nondistended.   BREASTS:  Well-healed surgical scar in her right breast with underlying scar tissue, right breast nipple contraction c/w left side, no mass or nodule palpated  SKIN: no rashes      No new labs today.    ASSESSMENT AND PLAN:  This 62-year-old woman with a T1b N0, ER/[place]-positive and HER2-negative invasive ductal carcinoma, grade 1, involving the right breast, status post lumpectomy and right breast RTx. She is now on Tamoxifen for adjuvant endocrine therapy.    1.  Breast cancer.  [id number], stage IA, start tamoxifen Sept 2015.Overall, Namita is tolerating her tamoxifen without any difficulty.  She is going to continue on this for at least 5 years.  We discussed that we will reevaluate next year whether to switch her to an aromatase inhibitor.  We will obtain a DEXA scan in 2018 to reevaluate.      Right chest fullness:  On examination she has no nodules or masses.  She had a normal mammogram and ultrasound in July.  Her symptoms are unchanged since then.  I discussed with her that she may have some edema from her surgery and radiation.  I recommended a referral to Lymphedema.  If this does not improve, I advised the patient to call us, in which case we could discuss further imaging.      Osteoporosis " in the radius bone, left spine and neck:  We will plan to repeat her bone density scan next summer.  At this time, she will remain on tamoxifen.       I encouraged her to see her primary care physician annually.      Delphine Alegria MD

## 2017-11-22 ENCOUNTER — HOSPITAL ENCOUNTER (OUTPATIENT)
Dept: MAMMOGRAPHY | Facility: CLINIC | Age: 62
Discharge: HOME | End: 2017-11-22
Attending: PHYSICIAN ASSISTANT | Admitting: PHYSICIAN ASSISTANT
Payer: COMMERCIAL

## 2017-11-22 ENCOUNTER — CARE COORDINATION (OUTPATIENT)
Dept: ONCOLOGY | Facility: CLINIC | Age: 62
End: 2017-11-22

## 2017-11-22 ENCOUNTER — TELEPHONE (OUTPATIENT)
Dept: ONCOLOGY | Facility: CLINIC | Age: 62
End: 2017-11-22

## 2017-11-22 DIAGNOSIS — Z17.0 MALIGNANT NEOPLASM OF RIGHT BREAST IN FEMALE, ESTROGEN RECEPTOR POSITIVE, UNSPECIFIED SITE OF BREAST (H): Primary | ICD-10-CM

## 2017-11-22 DIAGNOSIS — C50.911 MALIGNANT NEOPLASM OF RIGHT BREAST (H): ICD-10-CM

## 2017-11-22 DIAGNOSIS — C50.911 MALIGNANT NEOPLASM OF RIGHT BREAST IN FEMALE, ESTROGEN RECEPTOR POSITIVE, UNSPECIFIED SITE OF BREAST (H): Primary | ICD-10-CM

## 2017-11-22 DIAGNOSIS — C50.911 MALIGNANT NEOPLASM OF RIGHT BREAST (H): Primary | ICD-10-CM

## 2017-11-22 PROCEDURE — G0279 TOMOSYNTHESIS, MAMMO: HCPCS

## 2017-11-22 NOTE — TELEPHONE ENCOUNTER
Pt called into triage reporting tender, dime-size area at 2 o'clock position. No redness or swelling. Denies fever, chills. This was present when she saw Dr. Alegria on 11/11 but tenderness is worse, pain rated 1/10. No change to swelling of outer right breast, noted by Dr. Alegria at visit. Pt is seeing lymphedema on Friday.     Discussed with Rubi HALL. Opening with Jennifer HALL on Friday 11/24 at 4:20 pm. Pt will get right mammo diagnostic w/ andrea at [facility] today. Pt informed by RNCC.

## 2017-11-22 NOTE — PROGRESS NOTES
Patient had called in to nurse triage c/o increased tenderness of right breast at the 2:00 position w/o swelling or redness that has been worsening since seeing Dr Alegria 11/10/17. Nurse triage spoke to Rubi Gould for recommendations and scheduled an appointment with Jennifer Ty December 1, 17 at 4:20 and recommended a right breast diagnostic mammogram with andrea and ordered for Radiologist to make decisions for any further imaging.  Patient aware of all appointments as above. Answered all patient's questions and verbalized understanding. Elsy Azul RN, BSN.

## 2017-11-24 ENCOUNTER — ONCOLOGY VISIT (OUTPATIENT)
Dept: ONCOLOGY | Facility: CLINIC | Age: 62
End: 2017-11-24
Attending: PHYSICIAN ASSISTANT
Payer: COMMERCIAL

## 2017-11-24 ENCOUNTER — HOSPITAL ENCOUNTER (OUTPATIENT)
Dept: PHYSICAL THERAPY | Facility: CLINIC | Age: 62
Setting detail: THERAPIES SERIES
End: 2017-11-24
Attending: INTERNAL MEDICINE
Payer: COMMERCIAL

## 2017-11-24 VITALS
HEIGHT: 65 IN | WEIGHT: 155.1 LBS | SYSTOLIC BLOOD PRESSURE: 127 MMHG | DIASTOLIC BLOOD PRESSURE: 78 MMHG | TEMPERATURE: 96.8 F | RESPIRATION RATE: 16 BRPM | BODY MASS INDEX: 25.84 KG/M2 | HEART RATE: 74 BPM | OXYGEN SATURATION: 97 %

## 2017-11-24 DIAGNOSIS — Z17.0 MALIGNANT NEOPLASM OF RIGHT BREAST IN FEMALE, ESTROGEN RECEPTOR POSITIVE, UNSPECIFIED SITE OF BREAST (H): Primary | ICD-10-CM

## 2017-11-24 DIAGNOSIS — C50.911 MALIGNANT NEOPLASM OF RIGHT BREAST IN FEMALE, ESTROGEN RECEPTOR POSITIVE, UNSPECIFIED SITE OF BREAST (H): Primary | ICD-10-CM

## 2017-11-24 DIAGNOSIS — I89.0 LYMPHEDEMA: Primary | ICD-10-CM

## 2017-11-24 PROCEDURE — 99214 OFFICE O/P EST MOD 30 MIN: CPT | Mod: ZP | Performed by: PHYSICIAN ASSISTANT

## 2017-11-24 PROCEDURE — 99212 OFFICE O/P EST SF 10 MIN: CPT | Mod: ZF

## 2017-11-24 ASSESSMENT — PAIN SCALES - GENERAL: PAINLEVEL: NO PAIN (0)

## 2017-11-24 NOTE — MR AVS SNAPSHOT
After Visit Summary   11/24/2017    Namita Dodd [facility]    MRN: [id number]           Patient Information     Date Of Birth          1955        Visit Information        Provider Department      11/24/2017 4:10 PM Jennifer Ty PA-C [facility]        Today's Diagnoses     Malignant neoplasm of right breast in female, estrogen receptor positive, unspecified site of breast (H)    -  1       Follow-ups after your visit        Your next 10 appointments already scheduled     Dec 15, 2017  9:30 AM CST   (Arrive by 9:15 AM)   Lymphedema Treatment with Jonna Garcia PT   [facility] ([facility])    [address]  2nd Floor  [place] [address] [phone]              Who to contact     If you have questions or need follow up information about today's clinic visit or your schedule please contact [facility] directly at [phone].  Normal or non-critical lab and imaging results will be communicated to you by WellRighthart, letter or phone within 4 business days after the clinic has received the results. If you do not hear from us within 7 days, please contact the clinic through WellRighthart or phone. If you have a critical or abnormal lab result, we will notify you by phone as soon as possible.  Submit refill requests through Open [employer] Storage or call your pharmacy and they will forward the refill request to us. Please allow 3 business days for your refill to be completed.          Additional Information About Your Visit        MyChart Information     [employer] gives you secure access to your electronic health record. If you see a primary care provider, you can also send messages to your care team and make appointments. If you have questions, please call your primary care clinic.  If you do not have a primary care provider, please call [phone] and they will assist you.        Care EveryWhere ID     This is your  "Care EveryWhere ID. This could be used by other organizations to access your [place] medical records  [id number]        Your Vitals Were     Pulse Temperature Respirations Height Pulse Oximetry BMI (Body Mass Index)    74 96.8  F (36  C) (Oral) 16 1.663 m (5' 5.47\") 97% 25.44 kg/m2       Blood Pressure from Last 3 Encounters:   11/24/17 127/78   11/10/17 137/77   08/17/17 115/72    Weight from Last 3 Encounters:   11/24/17 70.4 kg (155 lb 1.6 oz)   11/10/17 69.7 kg (153 lb 11.2 oz)   08/17/17 69.2 kg (152 lb 9.6 oz)              Today, you had the following     No orders found for display       Primary Care Provider Office Phone # Fax #    Maritza Tamela Lowe [phone]       [address] [place] [address] 7[address]  [facility] [address]        Equal Access to Services     [facility]: Hadii aad ku hadasho Soomaali, waaxda luqadaha, qaybta kaalmada adeegyada, waxay jasmynein hayfrancine guzman . So [facility] [phone].    ATENCIÓN: Si habla español, tiene a isidro disposición servicios gratuitos de asistencia lingüística. [facility] [phone].    We comply with applicable federal civil rights laws and [place] laws. We do not discriminate on the basis of race, color, national origin, age, disability, sex, sexual orientation, or gender identity.            Thank you!     Thank you for choosing [facility]  for your care. Our goal is always to provide you with excellent care. Hearing back from our patients is one way we can continue to improve our services. Please take a few minutes to complete the written survey that you may receive in the mail after your visit with us. Thank you!             Your Updated Medication List - Protect others around you: Learn how to safely use, store and throw away your medicines at www.disposemymeds.org.          This list is accurate as of: 11/24/17  4:57 PM.  Always use your most recent med list.                   Brand Name Dispense Instructions " for use Diagnosis    CALCIUM PO      Take 2-4 tablets by mouth daily        cholecalciferol 1000 UNIT tablet    vitamin D3    30 tablet    Take by mouth daily        estradiol 10 MCG Tabs vaginal tablet    VAGIFEM    12 tablet    1 tablet three times weekly.    Osteopenia       order for DME     1 each    Equipment being ordered: Compression bra, compression luis armando/tank for truncal edema after lumpectomy and radiation for breast CA.    Lymphedema       tamoxifen 20 MG tablet    NOLVADEX    90 tablet    TAKE 1 TABLET (20 MG) BY MOUTH DAILY    Breast cancer, right breast (H)       vitamin B complex with vitamin C Tabs tablet      Take 1 tablet by mouth daily        zolpidem 5 MG tablet    AMBIEN    60 tablet    Take 1 tablet (5 mg) by mouth nightly as needed for sleep    Other insomnia

## 2017-11-24 NOTE — PROGRESS NOTES
" 11/24/17 1100   Rehab Discipline   Discipline PT   Type of Visit   Type of visit Initial Edema Evaluation       present No   General Information   Start of care 11/24/17   Referring physician Dr. Delphine Alegria   Orders Evaluate and treat as indicated   Order date 11/20/17   Medical diagnosis R Breast Cancer   Onset of illness / date of surgery 05/11/15   Edema onset 10/01/17   Affected body parts Trunk   Edema etiology Cancer with lymph node dissection;Radiation;Chemo;Surgery   Location - Cancer with lymph node dissection Pt diagnosed with invasive ductal carcinoma of the R breast in spring of 2015. She underwent lumpectomy and SLND followed by radiation and now tamoxifen   Location - Radiation R chest   Chemotherapy comments \"maintenance\"   Edema etiology comments Pt reports that her grandmother had lymphedema after breast cancer in her arm. Pt has been active and travels by plane 1x/month for work and not noticed any extremity swelling but over that past few weeks to months she has had some discomfort in the R side of torso and her bras are feeling more tight.   Surgical / medical history reviewed Yes   Edema special tests Mammogram  (earlier this month, negative)   Prior level of functional mobility Independent   Patient role / employment history Employed   Living environment [facility]   General observations Pt lives with , works from home but also travels for work.  She stays active with walking and was working with a  until the  left her gym. [facility]   Fall Risk Screen   Fall screen completed by PT   Have you fallen 2 or more times in the past year? No   Have you fallen and had an injury in the past year? No   Is patient a fall risk? No   Subjective Report   Patient report of symptoms I noticed some swelling on the side of my chest. I have had to get a bigger bra stetch   Patient / Family Goals   Patient / " family goals statement See what can be done about the swelling and hope to improve the pain   Pain   Patient currently in pain No   Pain comments Pain with movement when reaching across, pt reports occasional shooting type pain across the breast and chest.   Cognitive Status   Orientation Orientation to person, place and time   Level of consciousness Alert   Follows commands and answers questions 100% of the time   Personal safety and judgement Intact   Memory Intact   Edema Exam / Assessment   Skin condition Intact   Skin condition comments Pt with increased density though non-pitting under the R axilla when compared to L.  Scars mobile, some myofacial restriction noted on R chest laterally and anterior   Stemmer sign Negative   Girth Measurements   Girth Measurements Refer to separate girth measurement flowsheet   Volume UE   Right UE (mL) 1371.62   Left UE (mL) 1248.82   UE volume comparison RUE volume greater than LUE volume  (Pt is R hand dominant)   % difference 9.9   Range of Motion   ROM comments no deficits in neck or B shoulders   Strength   Strength No deficits were identified   Posture   Posture Forward head position;Kyphosis   Activities of Daily Living   Activities of Daily Living independent   Bed Mobility   Bed mobility independent   Transfers   Transfers independent   Gait / Locomotion   Gait / Locomotion independent   Coordination   Coordination Gross motor coordination appropriate   Muscle Tone   Muscle tone No deficits were identified   Planned Edema Interventions   Planned edema interventions Manual lymph drainage;Fit for compression garment;Exercises;Precautions to prevent infection / exacerbation;Education;Manual therapy;Home management program development;Skin care / precautions;ADL training   Clinical Impression   Criteria for skilled therapeutic intervention met Yes   Therapy diagnosis pain, poor clothing fit   Influenced by the following impairments / conditions Stage 2   Functional  limitations due to impairments / conditions discomfort with clothing and occasionally with reaching   Clinical Presentation Stable/Uncomplicated   Clinical Presentation Rationale Pt is medically stable, swelling is new   Clinical Decision Making (Complexity) Low complexity   Treatment frequency Other   Treatment duration 5 visists over 3 months, 90 days   Patient / family and/or staff in agreement with plan of care Yes   Risks and benefits of therapy have been explained Yes   Education Assessment   Preferred learning style Listening;Reading;Demonstration;Pictures / video   Barriers to learning No barriers   Goals   Edema Eval Goals 1;2;3   Goal 1   Goal identifier HEP   Goal description Pt will be independent with HEP to include skin care, exercise and self massage   Target date 02/21/18   Goal 2   Goal identifier Compression   Goal description Pt will obtain compression garments (tank, luis armando, bra) and not decreased fullness and swelling in the R trunk.   Target date 02/21/18   Goal 3   Goal identifier Pain   Goal description Pt will reports fewer episodes of the intense shooting pain when reaching or active.   Target date 02/21/18   Total Evaluation Time   Total evaluation time 20

## 2017-11-24 NOTE — NURSING NOTE
"Oncology Rooming Note    November 24, 2017 4:21 PM   Namita Hernandez is a 62 year old female who presents for:    Chief Complaint   Patient presents with     Oncology Clinic Visit     Return: Breast CA     Initial Vitals: /78  Pulse 74  Temp 96.8  F (36  C) (Oral)  Resp 16  Ht 1.663 m (5' 5.47\")  Wt 70.4 kg (155 lb 1.6 oz)  SpO2 97%  BMI 25.44 kg/m2 Estimated body mass index is 25.44 kg/(m^2) as calculated from the following:    Height as of this encounter: 1.663 m (5' 5.47\").    Weight as of this encounter: 70.4 kg (155 lb 1.6 oz). Body surface area is 1.8 meters squared.  No Pain (0) Comment: Data Unavailable   No LMP recorded.  Allergies reviewed: Yes  Medications reviewed: Yes    Medications: Medication refills not needed today.  Pharmacy name entered into [email or web address]: [id number] - [place], [place] - [address]    Clinical concerns: no new concerns     6 minutes for nursing intake (face to face time)     Nicky Jacques CMA              "

## 2017-11-24 NOTE — PROGRESS NOTES
"MEDICAL ONCOLOGY FOLLOW UP VISIT  November 24, 2017    Namita returns today for followup of a stage I, T1b N0, ER/MN-positive and HER2-negative breast cancer.      HISTORY OF PRESENT ILLNESS: Namita is a 62-year-old woman who comes in today for followup for her breast cancer. Please see prior notes by Dr. Alegria for further detail (06/01/2015).  Briefly, Namita underwent a mammogram and ultrasound.  Mammogram on 04/06/2015 suggested a 1.5 cm abnormal distortion confirmed by ultrasound.  By contrast mammography, this revealed an 18 mm area of abnormality at the 2 o'clock position involving her right breast.  Biopsy was recommended and confirmed an invasive ductal carcinoma, grade 1, ER/MN-positive and HER2-negative.  She underwent a lumpectomy and sentinel lymph node biopsy by Dr. Yoshi Oconnor.  Her final staging was a 6 mm tumor, T1b N0, ER/MN-positive and HER2-negative.  All margins were negative. She completed her RTx (52.4 Gy in 20 sessions 6/31-7/27). She started Tamoxifen in 09/2015.     INTERVAL HISTORY: Ju is here for interval follow-up.  She recently had increased discomfort in the right breast, which was located at the medial chest wall, then would migrate to the nipple, and lateral chest wall. She denies it benig pain, just enough to notice, transient symptoms. No lumps or bumps. She remains with the sensation of fullness in the right chest wall which is stable. She has needed bra extenders and new bras over the last few months. No new pains otherwise. She is tearful today regarding this and how she is worried her cancer will come back.    REVIEW OF SYSTEMS:  Her 10-point review of systems is otherwise negative.      PHYSICAL EXAMINATION: /78  Pulse 74  Temp 96.8  F (36  C) (Oral)  Resp 16  Ht 1.663 m (5' 5.47\")  Wt 70.4 kg (155 lb 1.6 oz)  SpO2 97%  BMI 25.44 kg/m2    GENERAL:  She is well-appearing, in no apparent distress.   HEENT:  Exam reveals no icterus.  Oropharynx is clear.  There are no " ulcers or lesions.   NECK:  Supple.  No cervical, supraclavicular or axillary adenopathy.   HEART:  Regular.   LUNGS:  Clear bilaterally.   ABDOMEN:  Soft, nontender and nondistended.   BREASTS:  Well-healed surgical scar in her right breast with underlying scar tissue, right breast nipple contraction c/w left side, no mass or nodule palpated  SKIN: no rashes      No new labs today.    ASSESSMENT AND PLAN:  This 62-year-old woman with a T1b N0, ER/NE-positive and HER2-negative invasive ductal carcinoma, grade 1, involving the right breast, status post lumpectomy and right breast RTx. She is now on Tamoxifen for adjuvant endocrine therapy. Here on an add-on basis for right breast fullness and discomfort.    1.  Breast cancer.  [id number], stage IA, start tamoxifen Sept 2015 in setting of osteoporosis on DEXA at that time. She saw Dr. Alegria for routine visit on 11/10. Plan to continue Tamoxifen and reconsider AI at some point next year with a repeat DEXA.    2. Right breast fullness and discomfort: Ongoing for months to the point of needing new bras and extenders. She was seen with this in July with unremarkable exam and normal imaging. Her symptoms have persisted and with increased discomfort, she had a mammogram 11/22 which was normal. No tenderness or masses on exam. I suspect that with the increasing fullness secondary to lymphedema (which was observed today at lymphedema therapy), she could be having some discomfort related to that and the effect it is having on her chest wall tissues. I encouraged Ju to continue the lymphedema therapy and exercises and call with any worsening symptoms. Symptoms to do not seem nerve-related so I don't think anything like gabapentin would be helpful. Discomfort is not significant to need any medication per her report.     Jennifer Ty PA-C

## 2017-12-04 ENCOUNTER — OFFICE VISIT (OUTPATIENT)
Dept: INTERNAL MEDICINE | Facility: CLINIC | Age: 62
End: 2017-12-04

## 2017-12-04 ENCOUNTER — TELEPHONE (OUTPATIENT)
Dept: INTERNAL MEDICINE | Facility: CLINIC | Age: 62
End: 2017-12-04

## 2017-12-04 VITALS
WEIGHT: 152.9 LBS | DIASTOLIC BLOOD PRESSURE: 81 MMHG | SYSTOLIC BLOOD PRESSURE: 122 MMHG | TEMPERATURE: 100.1 F | HEART RATE: 86 BPM | BODY MASS INDEX: 25.08 KG/M2

## 2017-12-04 DIAGNOSIS — J11.1 INFLUENZA WITH RESPIRATORY MANIFESTATION OTHER THAN PNEUMONIA: Primary | ICD-10-CM

## 2017-12-04 DIAGNOSIS — J10.1 INFLUENZA A: Primary | ICD-10-CM

## 2017-12-04 LAB
FLUAV+FLUBV RNA SPEC QL NAA+PROBE: NEGATIVE
FLUAV+FLUBV RNA SPEC QL NAA+PROBE: POSITIVE
RSV RNA SPEC NAA+PROBE: NEGATIVE
SPECIMEN SOURCE: ABNORMAL

## 2017-12-04 RX ORDER — OSELTAMIVIR PHOSPHATE 75 MG/1
75 CAPSULE ORAL 2 TIMES DAILY
Qty: 10 CAPSULE | Refills: 0 | Status: SHIPPED | OUTPATIENT
Start: 2017-12-04 | End: 2017-12-09

## 2017-12-04 ASSESSMENT — PAIN SCALES - GENERAL: PAINLEVEL: MILD PAIN (3)

## 2017-12-04 NOTE — PROGRESS NOTES
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 Since then she has been coughing but it is nonproductive and without shortness of breath or wheezing. She has had a fever of up to 101 with some chills and muscle aches. She has an intermittent bad headache. She does not have sinus drainage. She does not have a rash. Namita had an influenza vaccine on November 10.    Her mother was hospitalized 4 days ago for a pulmonary infection which involve draining fluid, finding something like a pleural effusion.      Immunization History   Administered Date(s) Administered     HepB 05/28/2014     Influenza Vaccine IM 3yrs+ 4 Valent IIV4 10/03/2016, 11/10/2017     OPV, trivalent, live 06/20/1991     Tdap (Adacel,Boostrix) 07/18/1991       Past Medical History:   Diagnosis Date     Breast cancer (H) 4/2015     Colon polyps      Hypovitaminosis D      Kidney stone 2010     Multiple thyroid nodules 2015    right dominant 1.3 cm; benign cytology 5/15     Osteoporosis 7/15    lowest T-score -3.2 33% radius     Pancreatic divisum      Recurrent UTI     since menopause (age 47) 3 x per year   /81  Pulse 86  Temp 100.1  F (37.8  C) (Oral)  Wt 69.4 kg (152 lb 14.4 oz)  Breastfeeding? No  BMI 25.08 kg/m2  Gen.:  Coughing, tired appearing 62-year-old woman  HEENT: TMs are clear bilaterally. Posterior pharynx is mildly erythematous with a cobblestone pattern without ulceration abscess or tonsillar enlargement. Sinuses are nontender. Conjunctiva are injected. There is no cervical lymphadenopathy.  Chest: Clear to auscultation and percussion bilaterally    ASSESSMENT  Viral URI versus influenza.    Plan: Obtain PCR for influenza A and B is a nasal swab, call patient with results. In the meantime and asked her to get some rest, drink lots of liquids, and stay away from her mother until the  infection resolves.

## 2017-12-04 NOTE — NURSING NOTE
Chief Complaint   Patient presents with     Flu Symptoms     Patient here for flu like symptoms.       Thelma Wu CMA at 10:34 AM on 12/4/2017.

## 2017-12-04 NOTE — MR AVS SNAPSHOT
After Visit Summary   12/4/2017    Namita Dodd Mary    MRN: [id number]           Patient Information     Date Of Birth          1955        Visit Information        Provider Department      12/4/2017 10:40 AM Minnie Roca MD [facility]        Today's Diagnoses     Influenza with respiratory manifestation other than pneumonia    -  1       Follow-ups after your visit        Your next 10 appointments already scheduled     Dec 15, 2017  9:30 AM CST   (Arrive by 9:15 AM)   Lymphedema Treatment with Jonna Garcia PT   [facility])    [address] [place] [address]   [phone]            Mar 06, 2018 11:00 AM CST   (Arrive by 10:45 AM)   Return Visit with Jennifer Ty PA-C   [facility])    [address] [place] [address]   [phone]            Jun 01, 2018  9:30 AM CDT   (Arrive by 9:15 AM)   Return Visit with Delphine Alegria MD   [facility])    [address] [place] [address] [phone]              Who to contact     Please call your clinic at [phone] to:    Ask questions about your health    Make or cancel appointments    Discuss your medicines    Learn about your test results    Speak to your doctor   If you have compliments or concerns about an experience at your clinic, or if you wish to file a complaint, please contact [facility] Patient Relations at [phone] or email us at [email or web address]         Additional Information About Your Visit        MyChart Information     MyChart gives you secure access to your electronic health record. If you see a primary care provider, you can also send messages to your care team and make appointments. If you have  questions, please call your primary care clinic.  If you do not have a primary care provider, please call [phone] and they will assist you.      Playdate App is an electronic gateway that provides easy, online access to your medical records. With Playdate App, you can request a clinic appointment, read your test results, renew a prescription or communicate with your care team.     To access your existing account, please contact your [facility] or call [phone] for assistance.        Care EveryWhere ID     This is your Care EveryWhere ID. This could be used by other organizations to access your [place] medical records  [id number]        Your Vitals Were     Pulse Temperature Breastfeeding? BMI (Body Mass Index)          86 100.1  F (37.8  C) (Oral) No 25.08 kg/m2         Blood Pressure from Last 3 Encounters:   12/04/17 122/81   11/24/17 127/78   11/10/17 137/77    Weight from Last 3 Encounters:   12/04/17 69.4 kg (152 lb 14.4 oz)   11/24/17 70.4 kg (155 lb 1.6 oz)   11/10/17 69.7 kg (153 lb 11.2 oz)              We Performed the Following     Influenza A/B and RSV PCR - Nasal Swab, Clinic Collect        Primary Care Provider Office Phone # Fax #    Maritza Tamela Nelly Lowe [phone]       [address] [place] [address] [address]  [facility] [address]        Equal Access to Services     [facility]: Hadii aissatou marks hadasho Soeliel, waaxda luqadaha, qaybta kaalmada braden, chico guzman . So [facility] [phone].    ATENCIÓN: Si habla español, tiene a isidro disposición servicios gratuitos de asistencia lingüística. Llame al [phone].    We comply with applicable federal civil rights laws and [place] laws. We do not discriminate on the basis of race, color, national origin, age, disability, sex, sexual orientation, or gender identity.            Thank you!     Thank you for choosing [facility]  for your care. Our goal is always to  provide you with excellent care. Hearing back from our patients is one way we can continue to improve our services. Please take a few minutes to complete the written survey that you may receive in the mail after your visit with us. Thank you!             Your Updated Medication List - Protect others around you: Learn how to safely use, store and throw away your medicines at www.disposemymeds.org.          This list is accurate as of: 12/4/17 11:25 AM.  Always use your most recent med list.                   Brand Name Dispense Instructions for use Diagnosis    CALCIUM PO      Take 2-4 tablets by mouth daily        cholecalciferol 1000 UNIT tablet    vitamin D3    30 tablet    Take by mouth daily        estradiol 10 MCG Tabs vaginal tablet    VAGIFEM    12 tablet    1 tablet three times weekly.    Osteopenia       order for DME     1 each    Equipment being ordered: Compression bra, compression luis armando/tank for truncal edema after lumpectomy and radiation for breast CA.    Lymphedema       tamoxifen 20 MG tablet    NOLVADEX    90 tablet    TAKE 1 TABLET (20 MG) BY MOUTH DAILY    Breast cancer, right breast (H)       vitamin B complex with vitamin C Tabs tablet      Take 1 tablet by mouth daily        zolpidem 5 MG tablet    AMBIEN    60 tablet    Take 1 tablet (5 mg) by mouth nightly as needed for sleep    Other insomnia

## 2017-12-15 ENCOUNTER — HOSPITAL ENCOUNTER (OUTPATIENT)
Dept: PHYSICAL THERAPY | Facility: CLINIC | Age: 62
Setting detail: THERAPIES SERIES
End: 2017-12-15
Attending: INTERNAL MEDICINE
Payer: COMMERCIAL

## 2017-12-15 PROCEDURE — 40000449 ZZHC STATISTIC PT VISIT, LYMPHEDEMA: Mod: ZF | Performed by: PHYSICAL THERAPIST

## 2017-12-15 PROCEDURE — 97110 THERAPEUTIC EXERCISES: CPT | Mod: GP,ZF | Performed by: PHYSICAL THERAPIST

## 2017-12-15 PROCEDURE — 97535 SELF CARE MNGMENT TRAINING: CPT | Mod: GP,ZF | Performed by: PHYSICAL THERAPIST

## 2017-12-15 PROCEDURE — 97140 MANUAL THERAPY 1/> REGIONS: CPT | Mod: GP,ZF | Performed by: PHYSICAL THERAPIST

## 2017-12-26 ENCOUNTER — OFFICE VISIT (OUTPATIENT)
Dept: INTERNAL MEDICINE | Facility: CLINIC | Age: 62
End: 2017-12-26
Payer: COMMERCIAL

## 2017-12-26 VITALS
OXYGEN SATURATION: 96 % | DIASTOLIC BLOOD PRESSURE: 79 MMHG | SYSTOLIC BLOOD PRESSURE: 138 MMHG | HEART RATE: 52 BPM | WEIGHT: 150.5 LBS | BODY MASS INDEX: 24.68 KG/M2

## 2017-12-26 DIAGNOSIS — R30.0 DYSURIA: ICD-10-CM

## 2017-12-26 DIAGNOSIS — R30.0 DYSURIA: Primary | ICD-10-CM

## 2017-12-26 LAB
ALBUMIN UR-MCNC: NEGATIVE MG/DL
APPEARANCE UR: CLEAR
BACTERIA #/AREA URNS HPF: ABNORMAL /HPF
BILIRUB UR QL STRIP: NEGATIVE
COLOR UR AUTO: YELLOW
GLUCOSE UR STRIP-MCNC: NEGATIVE MG/DL
HGB UR QL STRIP: NEGATIVE
KETONES UR STRIP-MCNC: NEGATIVE MG/DL
LEUKOCYTE ESTERASE UR QL STRIP: NEGATIVE
MUCOUS THREADS #/AREA URNS LPF: PRESENT /LPF
NITRATE UR QL: NEGATIVE
PH UR STRIP: 6 PH (ref 5–7)
RBC #/AREA URNS AUTO: 2 /HPF (ref 0–2)
SOURCE: ABNORMAL
SP GR UR STRIP: 1.02 (ref 1–1.03)
UROBILINOGEN UR STRIP-MCNC: 0 MG/DL (ref 0–2)
WBC #/AREA URNS AUTO: 1 /HPF (ref 0–2)

## 2017-12-26 ASSESSMENT — PAIN SCALES - GENERAL: PAINLEVEL: NO PAIN (1)

## 2017-12-26 NOTE — PROGRESS NOTES
[facility]  Primary Care Center   Minnie Roca MD  12/26/2017     Chief Complaint:   Pharyngitis        History of Present Illness: The patient's initial history was obtained by Disha Jensen, MS-2, then reviewed and verified by myself.    Namita Hernandez is a 62 year old female with a history of breast cancer, multiple thyroid nodules, and recurrent UTIs. The patient was last seen in clinic on 10/07/2016. She presents alone for evaluation of pharyngitis. The patient reports that about three weeks ago, she was seen and diagnosed with influenza A. The patient did take Tamiflu with some relief of her symptoms, but she was too busy and stressed with her mother's recent admission to the hospital to worry about her condition. The patient notes that her flu symptoms were almost resolved, however she did develop a nonproductive cough with a sore throat and dry mouth that is most evident in the morning. The patient notes that she has never had dry mouth before. She does appreciate slight tongue pain. The patient's sore throat, slight ear pain, and hoarse voice have been persistent. She feels as though she is more pale in the face recently. She denies fever, postnasal drip, congestion, sinus discomfort, taste changes, tongue color changes, pain with swallowing, shortness of breath, wheezing, chest pain, abdominal pain, constipation, diarrhea, rectal bleeding, hematuria, other pain or muscle aches, skin changes, and headache. The patient's mother did pass away on the December 11th due to complications from pseudomonas pneumonia. The patient was in very close contact to her mother just prior to her passing away.     In regard to the patient's dry mouth that she wakes up with daily, she expresses that she felt that she may be dehydrated, therefore she has been increasing her water intake.     She also mentions that in the last couple of days, she has noticed symptoms very similar to her recurrent UTIs. She typically drinks  more water and takes her cranberry pills when she begins to notice these symptoms. She does have antibiotics at home in case the symptoms progress to a UTI.     The patient mentions having occasional palpitations throughout the last year that typically onset while she is sitting that last about one minute. During these episodes, she does not experience exertional shortness of breath, chest pain, and lightheadedness. She mentions that she occasionally does experience slight tightness of the chest.     She reports sleeping better now than she has in the last five years.      Review of Systems:   10 point ROS negative other than the symptoms noted above in the HPI.    Active Medications:      tamoxifen (NOLVADEX) 20 MG tablet, TAKE 1 TABLET (20 MG) BY MOUTH DAILY, Disp: 90 tablet, Rfl: 3     estradiol (VAGIFEM) 10 MCG TABS vaginal tablet, 1 tablet three times weekly., Disp: 12 tablet, Rfl: 11     zolpidem (AMBIEN) 5 MG tablet, Take 1 tablet (5 mg) by mouth nightly as needed for sleep, Disp: 60 tablet, Rfl: 0     cholecalciferol (VITAMIN D) 1000 UNIT tablet, Take by mouth daily, Disp: 30 tablet, Rfl:      CALCIUM PO, Take 2-4 tablets by mouth daily , Disp: , Rfl:      vitamin  B complex with vitamin C (VITAMIN  B COMPLEX) TABS, Take 1 tablet by mouth daily, Disp: , Rfl:       Allergies:   Sulfa drugs      Past Medical History:  Breast cancer   Colon polyps  Hypovitaminosis D  Kidney stone  Multiple thyroid nodules  Osteoporosis  Pancreatic divisum  Recurrent UTI     Past Surgical History:  Excision benign lesion compl (Right)  Eye surgery  Lumpectomy breast with sentinel node, combined (Right)    Family History:   Breast cancer   Thyroid nodule   Diabetes      Social History:   The patient is , a former smoker (Quit date: 01/1986), and does consume about two glasses of wine daily.       Physical Exam:   /79  Pulse 52  Wt 68.3 kg (150 lb 8 oz)  SpO2 96%  BMI 24.68 kg/m2      Constitutional: Healthy,  alert, no distress and cooperative  Head: Normocephalic. No masses, lesions, tenderness or abnormalities  ENT: Tongue size and color normal. No lesions observed. No geographic tongue. No papules of the tongue. Slight hyperemia. Bilateral TM normal without fluid or infection. Difficulty visualizing right tympanic membrane. Hyperemia but no exudate of the posterior pharynx, no anterior or posterior cervical lymphadenopathy.  There is no thyromegaly or tenderness  Hematologic/Lymphatic/Immunologic: Posterior, anterior, and submandibular lymph nodes unremarkable. Normal cervical lymph nodes    TSH   Date Value Ref Range Status   11/10/2017 3.66 0.40 - 4.00 mU/L Final   ]    Assessment and Plan:  1. Dysuria  Laboratory order placed for recent dysuria and patient's history of recurrent urinary tract infections. Once results return, patient will be contacted through My Chart regarding further results and possible further care.   - UA with Micro reflex to Culture    2. Sore tongue and Dry mouth   No evidence of infection. No evidence of thyromegaly. Patient advised to gargle herbal tea with lemon.    We did not address the palpitations today.    Follow-up: Return to clinic on a PRN basis.         Scribe Disclosure:  ICorie, am serving as a scribe to document services personally performed by Minnie Roca MD at this visit, based upon the provider's statements to me. All documentation has been reviewed by the aforementioned provider prior to being entered into the official medical record.     Portions of this medical record were completed by a scribe. UPON MY REVIEW AND AUTHENTICATION BY ELECTRONIC SIGNATURE, this confirms (a) I performed the applicable clinical services, and (b) the record is accurate.     Minnie Roca MD

## 2017-12-26 NOTE — MR AVS SNAPSHOT
After Visit Summary   12/26/2017    Namita Hernandez    MRN: [id number]           Patient Information     Date Of Birth          1955        Visit Information        Provider Department      12/26/2017 1:00 PM Minnie Roca MD [facility]        Today's Diagnoses     Dysuria    -  1       Follow-ups after your visit        Your next 10 appointments already scheduled     Mar 06, 2018 11:00 AM CST   (Arrive by 10:45 AM)   Return Visit with Jennifer Ty PA-C   [facility] ([facility])    [address] [place] [address] [phone]            Jun 01, 2018  9:30 AM CDT   (Arrive by 9:15 AM)   Return Visit with Delphine Alegria MD   [facility] ([facility])    [address] [place] [address] [phone]              Who to contact     Please call your clinic at [phone] to:    Ask questions about your health    Make or cancel appointments    Discuss your medicines    Learn about your test results    Speak to your doctor   If you have compliments or concerns about an experience at your clinic, or if you wish to file a complaint, please contact [facility] Patient Relations at [phone] or email us at [email or web address]         Additional Information About Your Visit        MyChart Information     [employer] gives you secure access to your electronic health record. If you see a primary care provider, you can also send messages to your care team and make appointments. If you have questions, please call your primary care clinic.  If you do not have a primary care provider, please call [phone] and they will assist you.      [employer] is an electronic gateway that provides easy, online access to your medical records. With [employer], you can request a clinic appointment, read your test  results, renew a prescription or communicate with your care team.     To access your existing account, please contact your [facility] or call [phone] for assistance.        Care EveryWhere ID     This is your Care EveryWhere ID. This could be used by other organizations to access your [place] medical records  [id number]        Your Vitals Were     Pulse Pulse Oximetry BMI (Body Mass Index)             52 96% 24.68 kg/m2          Blood Pressure from Last 3 Encounters:   12/26/17 138/79   12/04/17 122/81   11/24/17 127/78    Weight from Last 3 Encounters:   12/26/17 68.3 kg (150 lb 8 oz)   12/04/17 69.4 kg (152 lb 14.4 oz)   11/24/17 70.4 kg (155 lb 1.6 oz)                 Today's Medication Changes          These changes are accurate as of: 12/26/17  1:50 PM.  If you have any questions, ask your nurse or doctor.               These medicines have changed or have updated prescriptions.        Dose/Directions    TAMOXIFEN CITRATE PO   This may have changed:  Another medication with the same name was removed. Continue taking this medication, and follow the directions you see here.   Changed by:  Minnie Roca MD        Dose:  20 mg   Take 20 mg by mouth daily   Refills:  0                Primary Care Provider Office Phone # Fax #    Maritza Tamela Nelly Lowe [phone]       [address] [place] [address] [phone]  [facility] [address]        Equal Access to Services     [facility] : Hadii aissatou cuencao Soeliel, waaxda luqadaha, qaybta kaalmada earleyada, chico goldsmith. So [facility] [phone].    ATENCIÓN: Si habla español, tiene a isidro disposición servicios gratuitos de asistencia lingüística. Llame al [phone].    We comply with applicable federal civil rights laws and [place] laws. We do not discriminate on the basis of race, color, national origin, age, disability, sex, sexual orientation, or gender identity.            Thank you!      Thank you for choosing [facility] CL[facility]IC  for your care. Our goal is always to provide you with excellent care. Hearing back from our patients is one way we can continue to improve our services. Please take a few minutes to complete the written survey that you may receive in the mail after your visit with us. Thank you!             Your Updated Medication List - Protect others around you: Learn how to safely use, store and throw away your medicines at www.disposemymeds.org.          This list is accurate as of: 12/26/17  1:50 PM.  Always use your most recent med list.                   Brand Name Dispense Instructions for use Diagnosis    CALCIUM PO      Take 2-4 tablets by mouth daily        cholecalciferol 1000 UNIT tablet    vitamin D3    30 tablet    Take by mouth daily        estradiol 10 MCG Tabs vaginal tablet    VAGIFEM    12 tablet    1 tablet three times weekly.    Osteopenia       order for DME     1 each    Equipment being ordered: Compression bra, compression luis armando/tank for truncal edema after lumpectomy and radiation for breast CA.    Lymphedema       TAMOXIFEN CITRATE PO      Take 20 mg by mouth daily        vitamin B complex with vitamin C Tabs tablet      Take 1 tablet by mouth daily        zolpidem 5 MG tablet    AMBIEN    60 tablet    Take 1 tablet (5 mg) by mouth nightly as needed for sleep    Other insomnia

## 2018-02-15 NOTE — PROGRESS NOTES
Outpatient Physical Therapy Discharge Note     Patient: Namita Dodd Mary  : 1955    Beginning/End Dates of Reporting Period:  17 to 12/15/2017    Referring Provider: Dr. Saige Alegria    Therapy Diagnosis: trunk edema, pain     Client Self Report: I was really good for a week but then my mom got sick, so it has been hard. She passed away this week. I do think that pad helps.    Goals:  Goal Identifier HEP   Goal Description Pt will be independent with HEP to include skin care, exercise and self massage   Target Date 18   Date Met  12/15/17   Progress:     Goal Identifier Compression   Goal Description Pt will obtain compression garments (tank, luis armando, bra) and not decreased fullness and swelling in the R trunk.   Target Date 18   Date Met   (Not met by patient has resources if needed)   Progress:     Goal Identifier Pain   Goal Description Pt will reports fewer episodes of the intense shooting pain when reaching or active.   Target Date 18   Date Met  12/15/17   Progress:       Progress Toward Goals:   Progress this reporting period: Pt was seen for 2 visits.  She felt nice improvement in swelling and decreased pain after the first session. Pt did not feel need to schedule more at the time. She was called 6 weeks later to follow-up and voicemail left asking patient to call in next 2 weeks to make appt if she has continued to have symptoms or PT will complete the episode if all is going well, assuming if no call that all is well.  Pt did not call for appt, will discharge at this time.    Plan:  Discharge from therapy.    Discharge:    Reason for Discharge: Patient has met all goals.  Patient has failed to schedule further appointments.    Equipment Issued: Swell spot for R chest    Discharge Plan: Patient to continue home program.  Pt certainly able to return to clinic if needs persist, will need new order.

## 2018-03-27 ENCOUNTER — ONCOLOGY VISIT (OUTPATIENT)
Dept: ONCOLOGY | Facility: CLINIC | Age: 63
End: 2018-03-27
Attending: PHYSICIAN ASSISTANT
Payer: COMMERCIAL

## 2018-03-27 VITALS
SYSTOLIC BLOOD PRESSURE: 122 MMHG | DIASTOLIC BLOOD PRESSURE: 78 MMHG | TEMPERATURE: 97 F | HEIGHT: 66 IN | RESPIRATION RATE: 16 BRPM | HEART RATE: 76 BPM | WEIGHT: 154.31 LBS | BODY MASS INDEX: 24.8 KG/M2 | OXYGEN SATURATION: 98 %

## 2018-03-27 DIAGNOSIS — C50.911 MALIGNANT NEOPLASM OF RIGHT BREAST IN FEMALE, ESTROGEN RECEPTOR POSITIVE, UNSPECIFIED SITE OF BREAST (H): Primary | ICD-10-CM

## 2018-03-27 DIAGNOSIS — Z17.0 MALIGNANT NEOPLASM OF RIGHT BREAST IN FEMALE, ESTROGEN RECEPTOR POSITIVE, UNSPECIFIED SITE OF BREAST (H): Primary | ICD-10-CM

## 2018-03-27 PROCEDURE — G0463 HOSPITAL OUTPT CLINIC VISIT: HCPCS | Mod: ZF

## 2018-03-27 PROCEDURE — 99213 OFFICE O/P EST LOW 20 MIN: CPT | Mod: ZP | Performed by: PHYSICIAN ASSISTANT

## 2018-03-27 ASSESSMENT — PAIN SCALES - GENERAL: PAINLEVEL: NO PAIN (0)

## 2018-03-27 NOTE — MR AVS SNAPSHOT
"              After Visit Summary   3/27/2018    Namita Hernandez    MRN: [id number]           Patient Information     Date Of Birth          1955        Visit Information        Provider Department      3/27/2018 5:50 PM Jennifer Ty PA-C [facility]        Today's Diagnoses     Malignant neoplasm of right breast in female, estrogen receptor positive, unspecified site of breast (H)    -  1       Follow-ups after your visit        Your next 10 appointments already scheduled     Jul 09, 2018  2:00 PM CDT   (Arrive by 1:45 PM)   MA DIAGNOSTIC DIGITAL BILATERAL with [id number]   [facility] ([facility])    [address]  [address]  [facility] [address] [phone]           Do not use any powder, lotion or deodorant under your arms or on your breast. If you do, we will ask you to remove it before your exam.  Wear comfortable, two-piece clothing.  If you have any allergies, tell your care team.  Bring any previous mammograms from other facilities or have them mailed to the breast center.  Three-dimensional (3D) mammograms are available at [facility] locations in [facility], [facility], [facility], [place], [place], and [place]. [facility] locations include [place] and [facility] in [place]. Benefits of 3D mammograms include: - Improved rate of cancer detection - Decreases your chance of having to go back for more tests, which means fewer: - \"False-positive\" results (This means that there is an abnormal area but it isn't cancer.) - Invasive testing procedures, such as a biopsy or surgery - Can provide clearer images of the breast if you have dense breast tissue. 3D mammography is an optional exam that anyone can have with a 2D mammogram. It doesn't replace or take the place of a 2D mammogram. 2D mammograms remain an effective screening test for all women.  Not all insurance companies cover the " "cost of a 3D mammogram. Check with your insurance.            Jul 09, 2018  2:30 PM CDT   (Arrive by 2:15 PM)   Return Visit with Delphine Alegria MD   [facility] ([facility] [place])    [address]  [address]  [facility] [address] [phone]              Future tests that were ordered for you today     Open Future Orders        Priority Expected Expires Ordered    Mammo diagnostic  digital (bilateral)* Routine  3/27/2019 3/27/2018            Who to contact     If you have questions or need follow up information about today's clinic visit or your schedule please contact [facility] directly at [phone].  Normal or non-critical lab and imaging results will be communicated to you by Vind[employer]art, letter or phone within 4 business days after the clinic has received the results. If you do not hear from us within 7 days, please contact the clinic through [employer] or phone. If you have a critical or abnormal lab result, we will notify you by phone as soon as possible.  Submit refill requests through [employer] or call your pharmacy and they will forward the refill request to us. Please allow 3 business days for your refill to be completed.          Additional Information About Your Visit        [employer] Information     [employer] gives you secure access to your electronic health record. If you see a primary care provider, you can also send messages to your care team and make appointments. If you have questions, please call your primary care clinic.  If you do not have a primary care provider, please call [phone] and they will assist you.        Care EveryWhere ID     This is your Care EveryWhere ID. This could be used by other organizations to access your [place] medical records  [id number]        Your Vitals Were     Pulse Temperature Respirations Height Pulse Oximetry Breastfeeding?    76 97  F (36.1  C) (Oral) 16 1.664 m (5' 5.5\") 98% No    BMI " (Body Mass Index)                   25.29 kg/m2            Blood Pressure from Last 3 Encounters:   03/27/18 122/78   12/26/17 138/79   12/04/17 122/81    Weight from Last 3 Encounters:   03/27/18 70 kg (154 lb 5 oz)   12/26/17 68.3 kg (150 lb 8 oz)   12/04/17 69.4 kg (152 lb 14.4 oz)               Primary Care Provider Office Phone # Fax #    Maritza Tamela Lowe [phone]       [address] [place] [address] [address] [place] [address]        Equal Access to Services     [facility]: Hadii aissatou marks hadasho Soeliel, waaxda luqadaha, qaybta kaalmada braden, chico guzman . So [facility] [phone].    ATENCIÓN: Si habla español, tiene a isidro disposición servicios gratuitos de asistencia lingüística. Ll[facility] [phone].    We comply with applicable federal civil rights laws and [place] laws. We do not discriminate on the basis of race, color, national origin, age, disability, sex, sexual orientation, or gender identity.            Thank you!     Thank you for choosing [facility]  for your care. Our goal is always to provide you with excellent care. Hearing back from our patients is one way we can continue to improve our services. Please take a few minutes to complete the written survey that you may receive in the mail after your visit with us. Thank you!             Your Updated Medication List - Protect others around you: Learn how to safely use, store and throw away your medicines at www.disposemymeds.org.          This list is accurate as of 3/27/18  6:48 PM.  Always use your most recent med list.                   Brand Name Dispense Instructions for use Diagnosis    CALCIUM PO      Take 2-4 tablets by mouth daily        cholecalciferol 1000 UNIT tablet    vitamin D3    30 tablet    Take by mouth daily        estradiol 10 MCG Tabs vaginal tablet    VAGIFEM    12 tablet    1 tablet three times weekly.    Osteopenia       order for DME     1 each     Equipment being ordered: Compression bra, compression luis armando/tank for truncal edema after lumpectomy and radiation for breast CA.    Lymphedema       TAMOXIFEN CITRATE PO      Take 20 mg by mouth daily        vitamin B complex with vitamin C Tabs tablet      Take 1 tablet by mouth daily        zolpidem 5 MG tablet    AMBIEN    60 tablet    Take 1 tablet (5 mg) by mouth nightly as needed for sleep    Other insomnia

## 2018-03-27 NOTE — PROGRESS NOTES
"MEDICAL ONCOLOGY FOLLOW UP VISIT  March 27, 2018      Namita returns today for followup of a stage I, T1b N0, ER/MS-positive and HER2-negative breast cancer.      HISTORY OF PRESENT ILLNESS: Namita is a 62-year-old woman who comes in today for followup for her breast cancer. Please see prior notes by Dr. Alegria for further detail (06/01/2015).  Briefly, Namita underwent a mammogram and ultrasound.  Mammogram on 04/06/2015 suggested a 1.5 cm abnormal distortion confirmed by ultrasound.  By contrast mammography, this revealed an 18 mm area of abnormality at the 2 o'clock position involving her right breast.  Biopsy was recommended and confirmed an invasive ductal carcinoma, grade 1, ER/MS-positive and HER2-negative.  She underwent a lumpectomy and sentinel lymph node biopsy by Dr. Yoshi Oconnor.  Her final staging was a 6 mm tumor, T1b N0, ER/MS-positive and HER2-negative.  All margins were negative. She completed her RTx (52.4 Gy in 20 sessions 6/31-7/27). She started Tamoxifen in 09/2015.     INTERVAL HISTORY: Ju is here for routine follow-up. Nothing new. Saw lymphedema therapy, and this helped her right breast pain and swelling, both of which are currently resolved. SHe is doing lymphedema massage and dry brushing. No new health concerns otherwise. No hot flashes, new pains, vaginal bleeding, or swelling. Her mother recently passed away and this has been upsetting to her; she feels she is coping well but reports its difficult to accept.    REVIEW OF SYSTEMS:  Her 10-point review of systems is otherwise negative.      PHYSICAL EXAMINATION: /78  Pulse 76  Temp 97  F (36.1  C) (Oral)  Resp 16  Ht 1.664 m (5' 5.5\")  Wt 70 kg (154 lb 5 oz)  SpO2 98%  Breastfeeding? No  BMI 25.29 kg/m2    GENERAL:  She is well-appearing, in no apparent distress.   HEENT:  Exam reveals no icterus.  Oropharynx is clear.  There are no ulcers or lesions.   NECK:  Supple.  No cervical, supraclavicular or axillary adenopathy.   HEART:  " Regular.   LUNGS:  Clear bilaterally.   ABDOMEN:  Soft, nontender and nondistended.   BREASTS:  Well-healed surgical scar in her right breast with underlying scar tissue, right breast nipple contraction c/w left side, no mass or nodule palpated  SKIN: no rashes      No new labs today.    ASSESSMENT AND PLAN:  This 62-year-old woman with a T1b N0, ER/ND-positive and HER2-negative invasive ductal carcinoma, grade 1, involving the right breast, status post lumpectomy and right breast RTx. She is now on Tamoxifen for adjuvant endocrine therapy.     1.  Breast cancer.  [id number], stage IA, started tamoxifen Sept 2015 in setting of osteoporosis on DEXA at that time. Plan to continue Tamoxifen and reconsider AI at some point at a future visit with Dr. Alegria. Follow-up in 3 months. Annual restaging due in July.    2. Right breast fullness and discomfort: This is now resolved. She went to lymphedema therapy and does lymph massage.    3. Bone health: DEXA 7/2017 showing low bone density. Continue supplementation with calcium, vitamin D; weight bearing exercises.    Jennifer Ty PA-C

## 2018-03-27 NOTE — NURSING NOTE
"Oncology Rooming Note    March 27, 2018 5:50 PM   Namita Hernandez is a 63 year old female who presents for:    Chief Complaint   Patient presents with     Oncology Clinic Visit     Return: Breast CA     Initial Vitals: /78  Pulse 76  Temp 97  F (36.1  C) (Oral)  Resp 16  Ht 1.664 m (5' 5.5\")  Wt 70 kg (154 lb 5 oz)  SpO2 98%  Breastfeeding? No  BMI 25.29 kg/m2 Estimated body mass index is 25.29 kg/(m^2) as calculated from the following:    Height as of this encounter: 1.664 m (5' 5.5\").    Weight as of this encounter: 70 kg (154 lb 5 oz). Body surface area is 1.8 meters squared.  No Pain (0) Comment: Data Unavailable   No LMP recorded. Patient is postmenopausal.  Allergies reviewed: Yes  Medications reviewed: Yes    Medications: Medication refills not needed today.  Pharmacy name entered into Lien Enforcement: [id number] - [place], [place] - [address] [place] [address]    Clinical concerns: no new concerns Jennifer Ty was notified.    10 minutes for nursing intake (face to face time)     Nicole Rankin CMA              "

## 2018-06-29 DIAGNOSIS — M85.80 OSTEOPENIA: ICD-10-CM

## 2018-07-03 RX ORDER — ESTRADIOL 10 UG/1
TABLET VAGINAL
Qty: 12 TABLET | Refills: 1 | Status: SHIPPED | OUTPATIENT
Start: 2018-07-03 | End: 2018-07-09

## 2018-07-09 ENCOUNTER — RADIANT APPOINTMENT (OUTPATIENT)
Dept: MAMMOGRAPHY | Facility: CLINIC | Age: 63
End: 2018-07-09
Payer: COMMERCIAL

## 2018-07-09 ENCOUNTER — ONCOLOGY VISIT (OUTPATIENT)
Dept: ONCOLOGY | Facility: CLINIC | Age: 63
End: 2018-07-09
Attending: INTERNAL MEDICINE
Payer: COMMERCIAL

## 2018-07-09 VITALS
DIASTOLIC BLOOD PRESSURE: 87 MMHG | SYSTOLIC BLOOD PRESSURE: 122 MMHG | OXYGEN SATURATION: 96 % | RESPIRATION RATE: 16 BRPM | TEMPERATURE: 98.9 F | HEART RATE: 75 BPM | HEIGHT: 66 IN | BODY MASS INDEX: 25.46 KG/M2 | WEIGHT: 158.4 LBS

## 2018-07-09 DIAGNOSIS — G47.09 OTHER INSOMNIA: ICD-10-CM

## 2018-07-09 DIAGNOSIS — Z85.3 PERSONAL HISTORY OF MALIGNANT NEOPLASM OF BREAST: Primary | ICD-10-CM

## 2018-07-09 DIAGNOSIS — N89.8 VAGINAL DRYNESS: ICD-10-CM

## 2018-07-09 DIAGNOSIS — Z17.0 MALIGNANT NEOPLASM OF RIGHT BREAST IN FEMALE, ESTROGEN RECEPTOR POSITIVE, UNSPECIFIED SITE OF BREAST (H): ICD-10-CM

## 2018-07-09 DIAGNOSIS — C50.911 MALIGNANT NEOPLASM OF RIGHT BREAST IN FEMALE, ESTROGEN RECEPTOR POSITIVE, UNSPECIFIED SITE OF BREAST (H): ICD-10-CM

## 2018-07-09 PROCEDURE — G0463 HOSPITAL OUTPT CLINIC VISIT: HCPCS | Mod: ZF

## 2018-07-09 PROCEDURE — 99214 OFFICE O/P EST MOD 30 MIN: CPT | Mod: ZP | Performed by: INTERNAL MEDICINE

## 2018-07-09 RX ORDER — ZOLPIDEM TARTRATE 5 MG/1
5 TABLET ORAL
Qty: 60 TABLET | Refills: 0 | Status: SHIPPED | OUTPATIENT
Start: 2018-07-09 | End: 2019-05-04

## 2018-07-09 RX ORDER — ESTRADIOL 10 UG/1
INSERT VAGINAL
Qty: 12 TABLET | Refills: 1 | Status: SHIPPED | OUTPATIENT
Start: 2018-07-09 | End: 2018-10-22

## 2018-07-09 RX ORDER — LETROZOLE 2.5 MG/1
2.5 TABLET, FILM COATED ORAL DAILY
Qty: 90 TABLET | Refills: 3 | Status: SHIPPED | OUTPATIENT
Start: 2018-07-09 | End: 2019-07-02

## 2018-07-09 ASSESSMENT — PAIN SCALES - GENERAL: PAINLEVEL: NO PAIN (0)

## 2018-07-09 NOTE — NURSING NOTE
"Oncology Rooming Note    July 9, 2018 2:44 PM   Namita Hernandez is a 63 year old female who presents for:    No chief complaint on file.    Initial Vitals: /87 (BP Location: Right arm, Patient Position: Chair, Cuff Size: Adult Regular)  Pulse 75  Temp 98.9  F (37.2  C) (Oral)  Resp 16  Ht 1.664 m (5' 5.51\")  Wt 71.8 kg (158 lb 6.4 oz)  SpO2 96%  BMI 25.95 kg/m2 Estimated body mass index is 25.95 kg/(m^2) as calculated from the following:    Height as of this encounter: 1.664 m (5' 5.51\").    Weight as of this encounter: 71.8 kg (158 lb 6.4 oz). Body surface area is 1.82 meters squared.  No Pain (0) Comment: Data Unavailable   No LMP recorded. Patient is postmenopausal.  Allergies reviewed: Yes  Medications reviewed: Yes    Medications: MEDICATION REFILLS NEEDED TODAY. Provider was notified.  Pharmacy name entered into [employer]: [id number] - [place], [place] - [address] [place] [address]    Clinical concerns: Patient needs refills - zolpidem, yuvafem       6 minutes for nursing intake (face to face time)     Reta Gamez CMA              "

## 2018-07-09 NOTE — MR AVS SNAPSHOT
"              After Visit Summary   7/9/2018    Namita Hernandez    MRN: [id number]           Patient Information     Date Of Birth          1955        Visit Information        Provider Department      7/9/2018 2:30 PM Delphine Alegria MD [facility]        Today's Diagnoses     Personal history of malignant neoplasm of breast    -  1    Other insomnia        Vaginal dryness           Follow-ups after your visit        Your next 10 appointments already scheduled     Jan 17, 2019  3:20 PM CST   (Arrive by 3:05 PM)   Return Visit with Jennifer Ty PA-C   [facility] [facility]    [address]  [address]  [facility] [address]   [phone]            Jul 08, 2019  2:30 PM CDT   MA SCREENING DIGITAL BILATERAL with [email or web address]   [facility] [facility]    [address]  [place] [address]   [phone]           Do not use any powder, lotion or deodorant under your arms or on your breast. If you do, we will ask you to remove it before your exam.  Wear comfortable, two-piece clothing.  If you have any allergies, tell your care team.  Bring any previous mammograms from other facilities or have them mailed to the breast center. Three-dimensional (3D) mammograms are available at [place] locations in [facility], [facility], [place], [place], and [place]. [facility] locations include [place] and [facility] in [place]. Benefits of 3D mammograms include: - Improved rate of cancer detection - Decreases your chance of having to go back for more tests, which means fewer: - \"False-positive\" results (This means that there is an abnormal area but it isn't cancer.) - Invasive testing procedures, such as a biopsy or surgery - Can provide clearer images of the breast if you have dense breast tissue. 3D mammography is an " optional exam that anyone can have with a 2D mammogram. It doesn't replace or take the place of a 2D mammogram. 2D mammograms remain an effective screening test for all women.  Not all insurance companies cover the cost of a 3D mammogram. Check with your insurance.            Jul 08, 2019  3:00 PM CDT   (Arrive by 2:45 PM)   Return Visit with Delphine Alegria MD   [facility] ([facility]    [address]  [address]  [facility] [address] [phone]              Who to contact     If you have questions or need follow up information about today's clinic visit or your schedule please contact [facility] directly at [phone].  Normal or non-critical lab and imaging results will be communicated to you by MyChart, letter or phone within 4 business days after the clinic has received the results. If you do not hear from us within 7 days, please contact the clinic through [employer] or phone. If you have a critical or abnormal lab result, we will notify you by phone as soon as possible.  Submit refill requests through [employer] or call your pharmacy and they will forward the refill request to us. Please allow 3 business days for your refill to be completed.          Additional Information About Your Visit        [employer] Information     [employer] gives you secure access to your electronic health record. If you see a primary care provider, you can also send messages to your care team and make appointments. If you have questions, please call your primary care clinic.  If you do not have a primary care provider, please call [phone] and they will assist you.        Care EveryWhere ID     This is your Care EveryWhere ID. This could be used by other organizations to access your [place] medical records  [id number]        Your Vitals Were     Pulse Temperature Respirations Height Pulse Oximetry BMI (Body Mass Index)    75 98.9  F (37.2  C) (Oral) 16  "1.664 m (5' 5.51\") 96% 25.95 kg/m2       Blood Pressure from Last 3 Encounters:   07/09/18 122/87   03/27/18 122/78   12/26/17 138/79    Weight from Last 3 Encounters:   07/09/18 71.8 kg (158 lb 6.4 oz)   03/27/18 70 kg (154 lb 5 oz)   12/26/17 68.3 kg (150 lb 8 oz)                 Today's Medication Changes          These changes are accurate as of 7/9/18 11:59 PM.  If you have any questions, ask your nurse or doctor.               Start taking these medicines.        Dose/Directions    letrozole 2.5 MG tablet   Commonly known as:  FEMARA   Used for:  Personal history of malignant neoplasm of breast   Started by:  Delphine Alegria MD        Dose:  2.5 mg   Take 1 tablet (2.5 mg) by mouth daily   Quantity:  90 tablet   Refills:  3         Stop taking these medicines if you haven't already. Please contact your care team if you have questions.     TAMOXIFEN CITRATE PO   Stopped by:  Delphine Alegria MD                Where to get your medicines      These medications were sent to [id number] - [place], [place] - [address]  [address], [place] [address]     Phone:  [phone]     estradiol 10 MCG Tabs vaginal tablet    letrozole 2.5 MG tablet         Some of these will need a paper prescription and others can be bought over the counter.  Ask your nurse if you have questions.     Bring a paper prescription for each of these medications     zolpidem 5 MG tablet                Primary Care Provider Office Phone # Fax #    Maritza Tamela Lowe [phone]       [address] [place] [address] [address] [place] [address]        Equal Access to Services     TRUONG HASTINGS AH: Hadtamra Yi, wazuri luesperanza, qaybta kaalmada braden, chico goldsmith. So [facility] [phone].    ATENCIÓN: Si habla español, tiene a isidro disposición servicios gratuitos de asistencia lingüística. Llame al [phone].    We comply with applicable federal civil rights " laws and [place] laws. We do not discriminate on the basis of race, color, national origin, age, disability, sex, sexual orientation, or gender identity.            Thank you!     Thank you for choosing [facility]  for your care. Our goal is always to provide you with excellent care. Hearing back from our patients is one way we can continue to improve our services. Please take a few minutes to complete the written survey that you may receive in the mail after your visit with us. Thank you!             Your Updated Medication List - Protect others around you: Learn how to safely use, store and throw away your medicines at www.disposemymeds.org.          This list is accurate as of 7/9/18 11:59 PM.  Always use your most recent med list.                   Brand Name Dispense Instructions for use Diagnosis    CALCIUM PO      Take 2-4 tablets by mouth daily        cholecalciferol 1000 UNIT tablet    vitamin D3    30 tablet    Take by mouth daily        estradiol 10 MCG Tabs vaginal tablet    YUVAFEM    12 tablet    INSERT 1 TABLET VAGINALLY THREE TIMES WEEKLY.    Vaginal dryness       letrozole 2.5 MG tablet    FEMARA    90 tablet    Take 1 tablet (2.5 mg) by mouth daily    Personal history of malignant neoplasm of breast       order for DME     1 each    Equipment being ordered: Compression bra, compression luis armando/tank for truncal edema after lumpectomy and radiation for breast CA.    Lymphedema       vitamin B complex with vitamin C Tabs tablet      Take 1 tablet by mouth daily        zolpidem 5 MG tablet    AMBIEN    60 tablet    Take 1 tablet (5 mg) by mouth nightly as needed for sleep    Other insomnia

## 2018-07-09 NOTE — LETTER
7/9/2018       RE: Namita Hernandez  [address] [place] [address]     Dear Colleague,    Thank you for referring your patient, Namita Hernandez, to the [facility]. Please see a copy of my visit note below.    MEDICAL ONCOLOGY FOLLOW UP VISIT    July 9, 2018    Namita returns today for followup of a stage I, T1b N0, ER/LA-positive and HER2-negative breast cancer.      HISTORY OF PRESENT ILLNESS: Namita is a 62-year-old woman who comes in today for followup for her breast cancer. Please see prior notes by Dr. Alegria for further detail (06/01/2015).  Briefly, Namita underwent a mammogram and ultrasound.  Mammogram on 04/06/2015 suggested a 1.5 cm abnormal distortion confirmed by ultrasound.  By contrast mammography, this revealed an 18 mm area of abnormality at the 2 o'clock position involving her right breast.  Biopsy was recommended and confirmed an invasive ductal carcinoma, grade 1, ER/LA-positive and HER2-negative.  She underwent a lumpectomy and sentinel lymph node biopsy by Dr. Yoshi Oconnor.  Her final staging was a 6 mm tumor, T1b N0, ER/LA-positive and HER2-negative.  All margins were negative. She completed her RTx (52.4 Gy in 20 sessions 6/31-7/27). She started Tamoxifen in 09/2015.     INTERVAL HISTORY: She returns today in follow up and feels quite well. In returning today, Namita says overall that she is feeling well.  She remains on tamoxifen.  She has noticed that overall she is tolerating the tamoxifen well.  She has occasional hot flashes.  She has no arthralgias or myalgias.  She reports urinary frequency.  She has no vaginal bleeding and no vaginal discharge.  Her mood is good.  She has no fevers or chills, chest pain or shortness of breath.        Her 10-point review of systems is otherwise negative.         PHYSICAL EXAMINATION: /87 (BP Location: Right arm, Patient Position: Chair, Cuff Size: Adult Regular)  Pulse 75  Temp 98.9  F (37.2  C) (Oral)  Resp 16  Ht 1.664 m (5'  "5.51\")  Wt 71.8 kg (158 lb 6.4 oz)  SpO2 96%  BMI 25.95 kg/m2    GENERAL:  She is well-appearing, in no apparent distress.   HEENT:  Exam reveals no icterus.  Oropharynx is clear.  There are no ulcers or lesions.   NECK:  Supple.  No cervical, supraclavicular or axillary adenopathy.   HEART:  Regular.   LUNGS:  Clear bilaterally.   ABDOMEN:  Soft, nontender and nondistended.   BREASTS:  Well-healed surgical scar in her right breast with underlying scar tissue,  no mass or nodule palpated in either breast  SKIN: no rashes      No new labs today.    ASSESSMENT AND PLAN:  This 62-year-old woman with a T1b N0, ER/NC-positive and HER2-negative invasive ductal carcinoma, grade 1, involving the right breast, status post lumpectomy and right breast RTx. She is now on Tamoxifen for adjuvant endocrine therapy.    1.  Breast cancer.  [id number], stage IA, start tamoxifen Sept 2015.Overall, Namita is tolerating her tamoxifen without any difficulty.  She is going to continue on endocrine therapy for at least 5 years.  We discussed that it would be reasonable to switch to an AI as per ATAC trial.  We discussed side effects of femara.  Will send out script.  Pt will let me know if she is not tolerating.        Osteoporosis in the radius bone, left spine and neck:  We reviewed that AIs can cause more bone loss; we discussed prophylactic bisphosphonates.  She will think about this.     I encouraged her to see her primary care physician annually.      Delphine Alegria MD  "

## 2018-07-09 NOTE — PROGRESS NOTES
"MEDICAL ONCOLOGY FOLLOW UP VISIT    July 9, 2018    Namita returns today for followup of a stage I, T1b N0, ER/LA-positive and HER2-negative breast cancer.      HISTORY OF PRESENT ILLNESS: Namita is a 62-year-old woman who comes in today for followup for her breast cancer. Please see prior notes by Dr. Alegria for further detail (06/01/2015).  Briefly, Namita underwent a mammogram and ultrasound.  Mammogram on 04/06/2015 suggested a 1.5 cm abnormal distortion confirmed by ultrasound.  By contrast mammography, this revealed an 18 mm area of abnormality at the 2 o'clock position involving her right breast.  Biopsy was recommended and confirmed an invasive ductal carcinoma, grade 1, ER/LA-positive and HER2-negative.  She underwent a lumpectomy and sentinel lymph node biopsy by Dr. Yoshi Oconnor.  Her final staging was a 6 mm tumor, T1b N0, ER/LA-positive and HER2-negative.  All margins were negative. She completed her RTx (52.4 Gy in 20 sessions 6/31-7/27). She started Tamoxifen in 09/2015.     INTERVAL HISTORY: She returns today in follow up and feels quite well. In returning today, Namita says overall that she is feeling well.  She remains on tamoxifen.  She has noticed that overall she is tolerating the tamoxifen well.  She has occasional hot flashes.  She has no arthralgias or myalgias.  She reports urinary frequency.  She has no vaginal bleeding and no vaginal discharge.  Her mood is good.  She has no fevers or chills, chest pain or shortness of breath.        Her 10-point review of systems is otherwise negative.         PHYSICAL EXAMINATION: /87 (BP Location: Right arm, Patient Position: Chair, Cuff Size: Adult Regular)  Pulse 75  Temp 98.9  F (37.2  C) (Oral)  Resp 16  Ht 1.664 m (5' 5.51\")  Wt 71.8 kg (158 lb 6.4 oz)  SpO2 96%  BMI 25.95 kg/m2    GENERAL:  She is well-appearing, in no apparent distress.   HEENT:  Exam reveals no icterus.  Oropharynx is clear.  There are no ulcers or lesions.   NECK:  Supple.  " No cervical, supraclavicular or axillary adenopathy.   HEART:  Regular.   LUNGS:  Clear bilaterally.   ABDOMEN:  Soft, nontender and nondistended.   BREASTS:  Well-healed surgical scar in her right breast with underlying scar tissue,  no mass or nodule palpated in either breast  SKIN: no rashes      No new labs today.    ASSESSMENT AND PLAN:  This 62-year-old woman with a T1b N0, ER/WA-positive and HER2-negative invasive ductal carcinoma, grade 1, involving the right breast, status post lumpectomy and right breast RTx. She is now on Tamoxifen for adjuvant endocrine therapy.    1.  Breast cancer.  [id number], stage IA, start tamoxifen Sept 2015.Overall, Namita is tolerating her tamoxifen without any difficulty.  She is going to continue on endocrine therapy for at least 5 years.  We discussed that it would be reasonable to switch to an AI as per ATAC trial.  We discussed side effects of femara.  Will send out script.  Pt will let me know if she is not tolerating.        Osteoporosis in the radius bone, left spine and neck:  We reviewed that AIs can cause more bone loss; we discussed prophylactic bisphosphonates.  She will think about this.     I encouraged her to see her primary care physician annually.      Delphine Alegria MD

## 2018-07-16 ENCOUNTER — OFFICE VISIT (OUTPATIENT)
Dept: DERMATOLOGY | Facility: CLINIC | Age: 63
End: 2018-07-16
Payer: COMMERCIAL

## 2018-07-16 DIAGNOSIS — L82.1 SEBORRHEIC KERATOSIS: ICD-10-CM

## 2018-07-16 DIAGNOSIS — L72.0 MILIUM: ICD-10-CM

## 2018-07-16 DIAGNOSIS — D48.5 NEOPLASM OF UNCERTAIN BEHAVIOR OF SKIN: ICD-10-CM

## 2018-07-16 DIAGNOSIS — D22.9 MULTIPLE BENIGN NEVI: ICD-10-CM

## 2018-07-16 DIAGNOSIS — L82.0 INFLAMED SEBORRHEIC KERATOSIS: ICD-10-CM

## 2018-07-16 DIAGNOSIS — Z12.83 SKIN CANCER SCREENING: Primary | ICD-10-CM

## 2018-07-16 ASSESSMENT — PAIN SCALES - GENERAL
PAINLEVEL: NO PAIN (0)
PAINLEVEL: NO PAIN (0)

## 2018-07-16 NOTE — MR AVS SNAPSHOT
After Visit Summary   7/16/2018    Namita Dodd [facility]    MRN: [id number]           Patient Information     Date Of Birth          1955        Visit Information        Provider Department      7/16/2018 10:15 AM Angie Arredondo PA-C [facility] Dermatology        Today's Diagnoses     Skin cancer screening    -  1    Neoplasm of uncertain behavior of skin        Inflamed seborrheic keratosis        Seborrheic keratosis        Multiple benign nevi        Milium          Care Instructions    Wound Care After a Biopsy    What is a skin biopsy?  A skin biopsy allows the doctor to examine a very small piece of tissue under the microscope to determine the diagnosis and the best treatment for the skin condition. A local anesthetic (numbing medicine)  is injected with a very small needle into the skin area to be tested. A small piece of skin is taken from the area. Sometimes a suture (stitch) is used.     What are the risks of a skin biopsy?  I will experience scar, bleeding, swelling, pain, crusting and redness. I may experience incomplete removal or recurrence. Risks of this procedure are excessive bleeding, bruising, infection, nerve damage, numbness, thick (hypertrophic or keloidal) scar and non-diagnostic biopsy.    How should I care for my wound for the first 24 hours?    Keep the wound dry and covered for 24 hours    If it bleeds, hold direct pressure on the area for 15 minutes. If bleeding does not stop then go to the emergency room    Avoid strenuous exercise the first 1-2 days or as your doctor instructs you    How should I care for the wound after 24 hours?    After 24 hours, remove the bandage    You may bathe or shower as normal    If you had a scalp biopsy, you can shampoo as usual and can use shower water to clean the biopsy site daily    Clean the wound twice a day with gentle soap and water    Do not scrub, be gentle    Apply white petroleum/Vaseline after cleaning the wound with a cotton  swab or a clean finger, and keep the site covered with a Bandaid /bandage. Bandages are not necessary with a scalp biopsy    If you are unable to cover the site with a Bandaid /bandage, re-apply ointment 2-3 times a day to keep the site moist. Moisture will help with healing    Avoid strenuous activity for first 1-2 days    Avoid lakes, rivers, pools, and oceans until the stitches are removed or the site is healed    How do I clean my wound?    Wash hands thoroughly with soap or use hand  before all wound care    Clean the wound with gentle soap and water    Apply white petroleum/Vaseline  to wound after it is clean    Replace the Bandaid /bandage to keep the wound covered for the first few days or as instructed by your doctor    If you had a scalp biopsy, warm shower water to the area on a daily basis should suffice    What should I use to clean my wound?     Cotton-tipped applicators (Qtips )    White petroleum jelly (Vaseline ). Use a clean new container and use Q-tips to apply.    Bandaids   as needed    Gentle soap     How should I care for my wound long term?    Do not get your wound dirty    Keep up with wound care for one week or until the area is healed.    A small scab will form and fall off by itself when the area is completely healed. The area will be red and will become pink in color as it heals. Sun protection is very important for how your scar will turn out. Sunscreen with an SPF 30 or greater is recommended once the area is healed.    You should have some soreness but it should be mild and slowly go away over several days. Talk to your doctor about using tylenol for pain,    When should I call my doctor?  If you have increased:     Pain or swelling    Pus or drainage (clear or slightly yellow drainage is ok)    Temperature over 100F    Spreading redness or warmth around wound    When will I hear about my results?  The biopsy results can take 2-3 weeks to come back. The clinic will call you  with the results, send you a Snupps message, or have you schedule a follow-up clinic or phone time to discuss the results. Contact our clinics if you do not hear from us in 3 weeks.     Who should I call with questions?    [facility]: [phone]     [facility]: [phone]    For urgent needs outside of business hours call the [facility] at [phone] and ask for the dermatology resident on call    Cryotherapy    What is it?    Use of a very cold liquid, such as liquid nitrogen, to freeze and destroy abnormal skin cells that need to be removed    What should I expect?    Tenderness and redness    A small blister that might grow and fill with dark purple blood. There may be crusting.    More than one treatment may be needed if the lesions do not go away.    How do I care for the treated area?    Gently wash the area with your hands when bathing.    Use a thin layer of Vaseline to help with healing. You may use a Band-Aid.     The area should heal within 7-10 days and may leave behind a pink or lighter color.     Do not use an antibiotic or Neosporin ointment.     You may take acetaminophen (Tylenol) for pain.     Call your Doctor if you have:    Severe pain    Signs of infection (warmth, redness, cloudy yellow drainage, and or a bad smell)    Questions or concerns    Who should I call with questions?       [facility]: [phone]       [facility]: [phone]       For urgent needs outside of business hours call the [facility] at [phone]        and ask for the dermatology resident on call            Follow-ups after your visit        Who to contact     Please call your clinic at [phone] to:    Ask questions about your health    Make or cancel appointments    Discuss your medicines    Learn about your test results    Speak to your doctor            Additional  Information About Your Visit        [employer] Information     [employer] gives you secure access to your electronic health record. If you see a primary care provider, you can also send messages to your care team and make appointments. If you have questions, please call your primary care clinic.  If you do not have a primary care provider, please call [phone] and they will assist you.      [employer] is an electronic gateway that provides easy, online access to your medical records. With [employer], you can request a clinic appointment, read your test results, renew a prescription or communicate with your care team.     To access your existing account, please contact your [facility] or call [phone] for assistance.        Care EveryWhere ID     This is your Care EveryWhere ID. This could be used by other organizations to access your [place] medical records  [id number]         Blood Pressure from Last 3 Encounters:   07/09/18 122/87   03/27/18 122/78   12/26/17 138/79    Weight from Last 3 Encounters:   07/09/18 71.8 kg (158 lb 6.4 oz)   03/27/18 70 kg (154 lb 5 oz)   12/26/17 68.3 kg (150 lb 8 oz)              We Performed the Following     BIOPSY SKIN/SUBQ/MUC MEM, SINGLE LESION     Dermatological path order and indications     DESTRUCT BENIGN LESION, UP TO 14        Primary Care Provider Office Phone # Fax #    Maritza Tamela Lowe [phone]       [address] [place] [address] [address] [place] [address]        Equal Access to Services     ELENA HASTINGS : Hadii aad ku hadasho Soomaali, waaxda luqadaha, qaybta kaalmada adeegyada, chico guzman . So [place] [phone].    ATENCIÓN: Si habla latriciaañol, tiene a isidro disposición servicios gratuitos de asistencia lingüística. Llame al [phone].    We comply with applicable federal civil rights laws and [place] laws. We do not discriminate on the basis of race, color, national origin, age,  disability, sex, sexual orientation, or gender identity.            Thank you!     Thank you for choosing [facility] DERMATOLOGY  for your care. Our goal is always to provide you with excellent care. Hearing back from our patients is one way we can continue to improve our services. Please take a few minutes to complete the written survey that you may receive in the mail after your visit with us. Thank you!             Your Updated Medication List - Protect others around you: Learn how to safely use, store and throw away your medicines at www.disposemymeds.org.          This list is accurate as of 7/16/18 10:41 AM.  Always use your most recent med list.                   Brand Name Dispense Instructions for use Diagnosis    CALCIUM PO      Take 2-4 tablets by mouth daily        cholecalciferol 1000 UNIT tablet    vitamin D3    30 tablet    Take by mouth daily        estradiol 10 MCG Tabs vaginal tablet    YUVAFEM    12 tablet    INSERT 1 TABLET VAGINALLY THREE TIMES WEEKLY.    Vaginal dryness       letrozole 2.5 MG tablet    FEMARA    90 tablet    Take 1 tablet (2.5 mg) by mouth daily    Personal history of malignant neoplasm of breast       order for DME     1 each    Equipment being ordered: Compression bra, compression luis armando/tank for truncal edema after lumpectomy and radiation for breast CA.    Lymphedema       vitamin B complex with vitamin C Tabs tablet      Take 1 tablet by mouth daily        zolpidem 5 MG tablet    AMBIEN    60 tablet    Take 1 tablet (5 mg) by mouth nightly as needed for sleep    Other insomnia

## 2018-07-16 NOTE — PATIENT INSTRUCTIONS
Wound Care After a Biopsy    What is a skin biopsy?  A skin biopsy allows the doctor to examine a very small piece of tissue under the microscope to determine the diagnosis and the best treatment for the skin condition. A local anesthetic (numbing medicine)  is injected with a very small needle into the skin area to be tested. A small piece of skin is taken from the area. Sometimes a suture (stitch) is used.     What are the risks of a skin biopsy?  I will experience scar, bleeding, swelling, pain, crusting and redness. I may experience incomplete removal or recurrence. Risks of this procedure are excessive bleeding, bruising, infection, nerve damage, numbness, thick (hypertrophic or keloidal) scar and non-diagnostic biopsy.    How should I care for my wound for the first 24 hours?    Keep the wound dry and covered for 24 hours    If it bleeds, hold direct pressure on the area for 15 minutes. If bleeding does not stop then go to the emergency room    Avoid strenuous exercise the first 1-2 days or as your doctor instructs you    How should I care for the wound after 24 hours?    After 24 hours, remove the bandage    You may bathe or shower as normal    If you had a scalp biopsy, you can shampoo as usual and can use shower water to clean the biopsy site daily    Clean the wound twice a day with gentle soap and water    Do not scrub, be gentle    Apply white petroleum/Vaseline after cleaning the wound with a cotton swab or a clean finger, and keep the site covered with a Bandaid /bandage. Bandages are not necessary with a scalp biopsy    If you are unable to cover the site with a Bandaid /bandage, re-apply ointment 2-3 times a day to keep the site moist. Moisture will help with healing    Avoid strenuous activity for first 1-2 days    Avoid lakes, rivers, pools, and oceans until the stitches are removed or the site is healed    How do I clean my wound?    Wash hands thoroughly with soap or use hand  before all  wound care    Clean the wound with gentle soap and water    Apply white petroleum/Vaseline  to wound after it is clean    Replace the Bandaid /bandage to keep the wound covered for the first few days or as instructed by your doctor    If you had a scalp biopsy, warm shower water to the area on a daily basis should suffice    What should I use to clean my wound?     Cotton-tipped applicators (Qtips )    White petroleum jelly (Vaseline ). Use a clean new container and use Q-tips to apply.    Bandaids   as needed    Gentle soap     How should I care for my wound long term?    Do not get your wound dirty    Keep up with wound care for one week or until the area is healed.    A small scab will form and fall off by itself when the area is completely healed. The area will be red and will become pink in color as it heals. Sun protection is very important for how your scar will turn out. Sunscreen with an SPF 30 or greater is recommended once the area is healed.    You should have some soreness but it should be mild and slowly go away over several days. Talk to your doctor about using tylenol for pain,    When should I call my doctor?  If you have increased:     Pain or swelling    Pus or drainage (clear or slightly yellow drainage is ok)    Temperature over 100F    Spreading redness or warmth around wound    When will I hear about my results?  The biopsy results can take 2-3 weeks to come back. The clinic will call you with the results, send you a Tickadet message, or have you schedule a follow-up clinic or phone time to discuss the results. Contact our clinics if you do not hear from us in 3 weeks.     Who should I call with questions?    [facility]: [phone]     [facility]: [phone]    For urgent needs outside of business hours call the [facility] at [phone] and ask for the dermatology resident on call    Cryotherapy    What is it?    Use  of a very cold liquid, such as liquid nitrogen, to freeze and destroy abnormal skin cells that need to be removed    What should I expect?    Tenderness and redness    A small blister that might grow and fill with dark purple blood. There may be crusting.    More than one treatment may be needed if the lesions do not go away.    How do I care for the treated area?    Gently wash the area with your hands when bathing.    Use a thin layer of Vaseline to help with healing. You may use a Band-Aid.     The area should heal within 7-10 days and may leave behind a pink or lighter color.     Do not use an antibiotic or Neosporin ointment.     You may take acetaminophen (Tylenol) for pain.     Call your Doctor if you have:    Severe pain    Signs of infection (warmth, redness, cloudy yellow drainage, and or a bad smell)    Questions or concerns    Who should I call with questions?       [facility]: [phone]       [facility]: [phone]       For urgent needs outside of business hours call the [facility] at [phone]        and ask for the dermatology resident on call

## 2018-07-16 NOTE — NURSING NOTE
"Dermatology Rooming Note    Namita Hernandez's goals for this visit include:   Chief Complaint   Patient presents with     Skin Check     Skin check,  Ju states \" I do have this little rough thing on the side of my face.\"      Maritza Brown LPN   "

## 2018-07-16 NOTE — LETTER
"7/16/2018       RE: Namita Hernandez  [address] [place] [address]     Dear Colleague,    Thank you for referring your patient, Namita Hernandez, to the [facility] DERMATOLOGY at [facility]. Please see a copy of my visit note below.    [facility] Dermatology Note      Dermatology Problem List:  1. Last total body skin exam: 7/16/2018    2. ISK, s/p cryo  3. NUB, left posterior shoulder, s/p shave bx 7/16/2018     Encounter Date: Jul 16, 2018    CC:  Chief Complaint   Patient presents with     Skin Check     Skin check,  Ju states \" I do have this little rough thing on the side of my face.\"          History of Present Illness:  Ms. Namita Hernandez is a 63 year old female, with no family history of skin cancer, who presents in self referral for a skin check as a new patient. She has had previous biopsies of moles- however these lesions returned from pathology as benign. She admits to significant sun exposure in her younger years, however she denies use of tanning beds.     Today the patient reports an area of concern on her face. Lately she has been experiencing a dry, rough patch of skin on her cheek. She finds this area is bothersome and she finds herself picking and itching at this area often. She has no moles of concern on her body- she feels like her spots are \"fading away\" with age.    Otherwise the patient reports no additional painful, bleeding, nonhealing or pruritic lesions and denies any new or changing moles.    Past Medical History:   Patient Active Problem List   Diagnosis     Breast cancer, right breast (H)     Multiple thyroid nodules     Cervical adenopathy     Elbow pain, left     Right shoulder pain     Hypovitaminosis D     Senile osteoporosis     Parathyroid abnormality (H)     History of colonic polyps     Past Medical History:   Diagnosis Date     Breast cancer (H) 4/2015     Colon polyps      Hypovitaminosis D      Kidney stone 2010     " Multiple thyroid nodules 2015    right dominant 1.3 cm; benign cytology 5/15     Osteoporosis 7/15    lowest T-score -3.2 33% radius     Pancreatic divisum      Recurrent UTI     since menopause (age 47) 3 x per year     Past Surgical History:   Procedure Laterality Date     EXCISION BENIGN LESION COMPL Right 1997    Abdominal lipoma removed      EYE SURGERY  1960    strabismus     LUMPECTOMY BREAST WITH SENTINEL NODE, COMBINED Right 5/11/2015    Procedure: COMBINED LUMPECTOMY BREAST WITH SENTINEL NODE;  Surgeon: Yoshi Oconnor MD;  Location:  OR       Social History:  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 The patient denies use of tanning beds. However, she admits to significant sun exposure in her younger years.    Family History:  There is no family history of skin cancer, eczema or psoriasis .    Medications:  Current Outpatient Prescriptions   Medication Sig Dispense Refill     CALCIUM PO Take 2-4 tablets by mouth daily        cholecalciferol (VITAMIN D) 1000 UNIT tablet Take by mouth daily 30 tablet      estradiol (YUVAFEM) 10 MCG TABS vaginal tablet INSERT 1 TABLET VAGINALLY THREE TIMES WEEKLY. 12 tablet 1     letrozole (FEMARA) 2.5 MG tablet Take 1 tablet (2.5 mg) by mouth daily 90 tablet 3     order for DME Equipment being ordered: Compression bra, compression luis armando/tank for truncal edema after lumpectomy and radiation for breast CA. 1 each 0     vitamin  B complex with vitamin C (VITAMIN  B COMPLEX) TABS Take 1 tablet by mouth daily       zolpidem (AMBIEN) 5 MG tablet Take 1 tablet (5 mg) by mouth nightly as needed for sleep 60 tablet 0       Allergies   Allergen Reactions     Sulfa Drugs Hives       Review of Systems:  -Skin/Heme New Pt: The patient admits to frequent sun exposure. The patient denies excessive scarring or problems healing except as per HPI. The patient denies excessive bleeding.  -Constitutional: The patient is feeling generally well    -Skin: As above in HPI. No additional skin concerns.    Physical exam:  Vitals: There were no vitals taken for this visit.  GEN: This is a well developed, well-nourished female in no acute distress, in a pleasant mood.    SKIN: Total skin excluding the undergarment areas was performed. The exam included the head/face, neck, both arms, chest, back, abdomen, both legs, digits and/or nails.   -Multiple regular brown pigmented macules and papules are identified on the trunk and extremities.   -There is a waxy stuck on tan to brown papule on on an erythematous base to the right upper cheek   - There are tan to brown stuck on papules scattered to the back.  - There is a 3 mm pink pearly papule with telangiectasis on the left posterior shoulder    -There are dome shaped bright red papules on the trunk  - There is a white 1-2 mm papule on the right periocular cheek   -No other lesions of concern on areas examined.     Impression/Plan:  1. Multiple clinically benign nevi on the trunk and extremities     ABCDs of melanoma were discussed and self skin checks were advised.    Sun precaution was advised including the use of sun screens of SPF 30 or higher, sun protective clothing, and avoidance of tanning beds.      2. Seborrheic keratosis, symptomatic    Cryotherapy procedure note: After verbal consent and discussion of risks and benefits including but no limited to dyspigmentation/scar, blister, and pain, 1 was treated with 1-2mm freeze border for 2 cycles with liquid nitrogen. Post cryotherapy instructions were provided.     3. Cherry angioma(s)    Reassured of benign nature- no further intervention required     4. Neoplasm of uncertain behavior on the left posterior shoulder. The differential diagnosis to include: ISK vs bcc vs other    Shave biopsy:  After discussion of benefits and risks including but not limited to bleeding/bruising, pain/swelling, infection, scar, incomplete removal, nerve damage/numbness, recurrence,  and non-diagnostic biopsy, written consent, verbal consent and photographs were obtained. Time-out was performed. The area was cleaned with isopropyl alcohol.  was injected to obtain adequate anesthesia of the lesion on the left posterior shoulder. 2.0 of 1% lidocaine was injected to obtain adequate anesthesia. A  shave biopsy was performed. Hemostasis was achieved with aluminium chloride. Vaseline and a sterile dressing were applied. The patient tolerated the procedure and no complications were noted. The patient was provided with verbal and written post care instructions.      5. Milia, right periocular cheek    Area was prepped with an isopropyl alcohol wipe. Lesion was expressed from the face utilizing sterile derm blade and cotton applicators    Follow-up in 1 year, earlier for new or changing lesions.       Staff Involved:  Scribe/Staff    Scribe Disclosure:   I, Aracely Leiva, am serving as a scribe to document services personally performed by Angie Arredondo PA-C, based on data collection and the provider's statements to me.    Provider Disclosure:   The documentation recorded by the scribe accurately reflects the services I personally performed and the decisions made by me.    All risks, benefits and alternatives were discussed with patient.  Patient is in agreement and understands the assessment and plan.  All questions were answered.  Sun Screen Education was given.   Return to Clinic annually or sooner as needed.   Angie Arredondo PA-C   [facility]       Pictures were placed in Pt's chart today for future reference.          Again, thank you for allowing me to participate in the care of your patient.      Sincerely,    Angie Arredondo PA-C

## 2018-07-16 NOTE — PROGRESS NOTES
"[facility] Dermatology Note      Dermatology Problem List:  1. Last total body skin exam: 7/16/2018    2. ISK, s/p cryo  3. NUB, left posterior shoulder, s/p shave bx 7/16/2018     Encounter Date: Jul 16, 2018    CC:  Chief Complaint   Patient presents with     Skin Check     Skin check,  Ju states \" I do have this little rough thing on the side of my face.\"          History of Present Illness:  Ms. Namita Hernandez is a 63 year old female, with no family history of skin cancer, who presents in self referral for a skin check as a new patient. She has had previous biopsies of moles- however these lesions returned from pathology as benign. She admits to significant sun exposure in her younger years, however she denies use of tanning beds.     Today the patient reports an area of concern on her face. Lately she has been experiencing a dry, rough patch of skin on her cheek. She finds this area is bothersome and she finds herself picking and itching at this area often. She has no moles of concern on her body- she feels like her spots are \"fading away\" with age.    Otherwise the patient reports no additional painful, bleeding, nonhealing or pruritic lesions and denies any new or changing moles.    Past Medical History:   Patient Active Problem List   Diagnosis     Breast cancer, right breast (H)     Multiple thyroid nodules     Cervical adenopathy     Elbow pain, left     Right shoulder pain     Hypovitaminosis D     Senile osteoporosis     Parathyroid abnormality (H)     History of colonic polyps     Past Medical History:   Diagnosis Date     Breast cancer (H) 4/2015     Colon polyps      Hypovitaminosis D      Kidney stone 2010     Multiple thyroid nodules 2015    right dominant 1.3 cm; benign cytology 5/15     Osteoporosis 7/15    lowest T-score -3.2 33% radius     Pancreatic divisum      Recurrent UTI     since menopause (age 47) 3 x per year     Past Surgical History:   Procedure Laterality Date     " EXCISION BENIGN LESION COMPL Right 1997    Abdominal lipoma removed      EYE SURGERY  1960    strabismus     LUMPECTOMY BREAST WITH SENTINEL NODE, COMBINED Right 5/11/2015    Procedure: COMBINED LUMPECTOMY BREAST WITH SENTINEL NODE;  Surgeon: Yoshi Oconnor MD;  Location:  OR       Social History:  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 The patient denies use of tanning beds. However, she admits to significant sun exposure in her younger years.    Family History:  There is no family history of skin cancer, eczema or psoriasis .    Medications:  Current Outpatient Prescriptions   Medication Sig Dispense Refill     CALCIUM PO Take 2-4 tablets by mouth daily        cholecalciferol (VITAMIN D) 1000 UNIT tablet Take by mouth daily 30 tablet      estradiol (YUVAFEM) 10 MCG TABS vaginal tablet INSERT 1 TABLET VAGINALLY THREE TIMES WEEKLY. 12 tablet 1     letrozole (FEMARA) 2.5 MG tablet Take 1 tablet (2.5 mg) by mouth daily 90 tablet 3     order for DME Equipment being ordered: Compression bra, compression luis armando/tank for truncal edema after lumpectomy and radiation for breast CA. 1 each 0     vitamin  B complex with vitamin C (VITAMIN  B COMPLEX) TABS Take 1 tablet by mouth daily       zolpidem (AMBIEN) 5 MG tablet Take 1 tablet (5 mg) by mouth nightly as needed for sleep 60 tablet 0       Allergies   Allergen Reactions     Sulfa Drugs Hives       Review of Systems:  -Skin/Heme New Pt: The patient admits to frequent sun exposure. The patient denies excessive scarring or problems healing except as per HPI. The patient denies excessive bleeding.  -Constitutional: The patient is feeling generally well   -Skin: As above in HPI. No additional skin concerns.    Physical exam:  Vitals: There were no vitals taken for this visit.  GEN: This is a well developed, well-nourished female in no acute distress, in a pleasant mood.    SKIN: Total skin excluding the undergarment areas was  performed. The exam included the head/face, neck, both arms, chest, back, abdomen, both legs, digits and/or nails.   -Multiple regular brown pigmented macules and papules are identified on the trunk and extremities.   -There is a waxy stuck on tan to brown papule on on an erythematous base to the right upper cheek   - There are tan to brown stuck on papules scattered to the back.  - There is a 3 mm pink pearly papule with telangiectasis on the left posterior shoulder    -There are dome shaped bright red papules on the trunk  - There is a white 1-2 mm papule on the right periocular cheek   -No other lesions of concern on areas examined.     Impression/Plan:  1. Multiple clinically benign nevi on the trunk and extremities     ABCDs of melanoma were discussed and self skin checks were advised.    Sun precaution was advised including the use of sun screens of SPF 30 or higher, sun protective clothing, and avoidance of tanning beds.      2. Seborrheic keratosis, symptomatic    Cryotherapy procedure note: After verbal consent and discussion of risks and benefits including but no limited to dyspigmentation/scar, blister, and pain, 1 was treated with 1-2mm freeze border for 2 cycles with liquid nitrogen. Post cryotherapy instructions were provided.     3. Cherry angioma(s)    Reassured of benign nature- no further intervention required     4. Neoplasm of uncertain behavior on the left posterior shoulder. The differential diagnosis to include: ISK vs bcc vs other    Shave biopsy:  After discussion of benefits and risks including but not limited to bleeding/bruising, pain/swelling, infection, scar, incomplete removal, nerve damage/numbness, recurrence, and non-diagnostic biopsy, written consent, verbal consent and photographs were obtained. Time-out was performed. The area was cleaned with isopropyl alcohol.  was injected to obtain adequate anesthesia of the lesion on the left posterior shoulder. 2.0 of 1% lidocaine was  injected to obtain adequate anesthesia. A  shave biopsy was performed. Hemostasis was achieved with aluminium chloride. Vaseline and a sterile dressing were applied. The patient tolerated the procedure and no complications were noted. The patient was provided with verbal and written post care instructions.      5. Milia, right periocular cheek    Area was prepped with an isopropyl alcohol wipe. Lesion was expressed from the face utilizing sterile derm blade and cotton applicators    Follow-up in 1 year, earlier for new or changing lesions.       Staff Involved:  Scribe/Staff    Scribe Disclosure:   Aracely GUTIERREZ, am serving as a scribe to document services personally performed by Angie Arredondo PA-C, based on data collection and the provider's statements to me.    Provider Disclosure:   The documentation recorded by the scribe accurately reflects the services I personally performed and the decisions made by me.    All risks, benefits and alternatives were discussed with patient.  Patient is in agreement and understands the assessment and plan.  All questions were answered.  Sun Screen Education was given.   Return to Clinic annually or sooner as needed.   Angie Arredondo PA-C   [facility]

## 2018-07-20 LAB — COPATH REPORT: NORMAL

## 2018-07-23 ENCOUNTER — TELEPHONE (OUTPATIENT)
Dept: DERMATOLOGY | Facility: CLINIC | Age: 63
End: 2018-07-23

## 2018-07-23 DIAGNOSIS — C44.91 BASAL CELL CARCINOMA: Primary | ICD-10-CM

## 2018-08-20 ENCOUNTER — TELEPHONE (OUTPATIENT)
Dept: DERMATOLOGY | Facility: CLINIC | Age: 63
End: 2018-08-20

## 2018-08-20 NOTE — TELEPHONE ENCOUNTER
History of excisions (cysts, skin cancer) : Yes    Immunosuppressive Medications: No (if yes, which ones: No)    HIV or Hepatitis B or C : No    Any Bleeding disorders: No    Do you have a Pacemaker or Defibrillator: No (year placed: No)    Artificial heart valve: No (mechanical or porcine: No)    Joint Replacement in the last 2 years: No Joint(s): No Year(s): No    Do you typically take Prophylactic Antibiotics before seeing a dentist or having a procedure?: No     If yes, verify that patient has the antibiotic on hand and instruct to take one hour before their surgery appointment.    Verify the patients pharmacy and notify Dr. DOS SANTOS regarding need for antibiotics.     Do you have any other health issues that we should know about? Had breast cancer 3 years ago    Do you take any Blood Thinners: No    Medication Allergies: Sulfa drugs    Yes. Patient was reminded to take all medications as usual.      Please inform the patient of the following:    Inform the patient that the incision will be larger then the spot that needs to be removed. For the wound to close in a straight line an ellipse (football shape) needs to be removed. That tissue is sent for pathology. (1-2 weeks for results)   Local anesthetic (Lidocaine and epinephrine) is used to numb the area. Depending on the location of the spot physical limitation may be required.

## 2018-08-23 ENCOUNTER — OFFICE VISIT (OUTPATIENT)
Dept: DERMATOLOGY | Facility: CLINIC | Age: 63
End: 2018-08-23
Attending: PHYSICIAN ASSISTANT
Payer: COMMERCIAL

## 2018-08-23 VITALS — SYSTOLIC BLOOD PRESSURE: 127 MMHG | HEART RATE: 94 BPM | DIASTOLIC BLOOD PRESSURE: 79 MMHG

## 2018-08-23 DIAGNOSIS — C44.519 BCC (BASAL CELL CARCINOMA), TRUNK: Primary | ICD-10-CM

## 2018-08-23 ASSESSMENT — PAIN SCALES - GENERAL
PAINLEVEL: NO PAIN (0)
PAINLEVEL: NO PAIN (0)

## 2018-08-23 NOTE — NURSING NOTE
Chief Complaint   Patient presents with     Derm Problem     Namita is here today for excision procedure on her left shoulder due to BCC     Joanne Cruz LPN

## 2018-08-23 NOTE — MR AVS SNAPSHOT
After Visit Summary   8/23/2018    Namita Dodd [facility]    MRN: [id number]           Patient Information     Date Of Birth          1955        Visit Information        Provider Department      8/23/2018 9:20 AM Mitra Kent MD [facility] D[facility]at[facility]        Care Instructions    Excision Wound Care Instructions  I will experience scar, altered skin color, bleeding, swelling, pain, crusting and redness. I may experience altered sensation. Risks are excessive bleeding, infection, muscle weakness, thick (hypertrophic or keloidal) scar, and recurrence,. A second procedure may be recommended to obtain the best cosmetic or functional result.  Possible complications of any surgical procedure are bleeding, infection, scarring, alteration in skin color and sensation, muscle weakness in the area, wound dehiscence or seperation, or recurrence of the lesion or disease. On occasion, after healing, a secondary procedure or revision may be recommended in order to obtain the best cosmetic or functional result.   After your surgery, a pressure bandage will be placed over the area that has sutures. This will help prevent bleeding. Please follow these instructions until you come back to clinic for suture removal in 14 days, as they will help you to prevent complications as your wound heals.  For the First 48 hours After Surgery:  1. Leave the pressure bandage on and keep it dry. If it should come loose, you may retape it, but do not take it off.  2. Relax and take it easy. Do not do any vigorous exercise, heavy lifting, or bending forward. This could cause the wound to bleed.  3. Post-operative pain is usually mild. You may take plain or extra strength Tylenol every 4 hours as needed (do not take more than 4,000mg in one day). Do not take any medicine that contains aspirin, ibuprofen or motrin unless you have been recommended these by a doctor.  Avoid alcohol and vitamin E as these may increase your tendency to  bleed.  4. You may put an ice pack around the bandaged area for 20 minutes every 2-3 hours. This may help reduce swelling, bruising, and pain. Make sure the ice pack is waterproof so that the pressure bandage does not get wet.   5. You may see a small amount of drainage or blood on your pressure bandage. This is normal. However, if drainage or bleeding continues or saturates the bandage, you will need to apply firm pressure over the bandage with a washcloth for 15 minutes. If bleeding continues after applying pressure for 15 minutes then go to the nearest emergency room.  48 Hours After Surgery  Carefully remove the bandage and start daily wound care and dressing changes. You may also now shower and get the wound wet. Wash wound with a mild soap and water.  Use caution when washing the wound. Be gentle and do not let the forceful shower stream hit the wound directly.  PAT dry.  Daily Wound Care:  1. Wash wound with a mild soap and water.  Use caution when washing the wound, be gentle and do not let the forceful shower stream hit the wound directly.  2. PAT DRY.  3. Apply Vaseline (from a new container or tube) over the suture line with a Q-tip. It is very important to keep the wound continuously moist, as wounds heal best in a moist environment.  4.  Keep the site covered until sutures are removed, you can cover it with a Telfa (non-stick) dressing and tape or a band-aid.    5. If you are unable to keep wound covered, you must apply Vaseline every 2 - 3 hours (while awake) to ensure it is being kept moist for optimal healing. A dressing overnight is recommended to keep the area moist.   Call Us If:  1. You have pain that is not controlled with Tylenol.  2. You have signs or symptoms of an infection, such as: fever over 100 degrees F, redness, warmth, or foul-smelling or yellow/creamy drainage from the wound.  Who should I call with questions?    [facility]: [phone]  "    [facility]: [phone]    For urgent needs outside of business hours call the [facility] at [phone] and ask for the dermatology resident on call              Follow-ups after your visit        Your next 10 appointments already scheduled     Jan 17, 2019  3:20 PM CST   (Arrive by 3:05 PM)   Return Visit with Jennifer Ty PA-C   [facility] ([facility])    [address]  [address]  [facility] [id number] [phone]            Jul 08, 2019  2:30 PM CDT   MA SCREENING DIGITAL BILATERAL with [id number]   [facility] ([facility])    [address]  [address] Floor  [place] [id number] [phone]           Do not use any powder, lotion or deodorant under your arms or on your breast. If you do, we will ask you to remove it before your exam.  Wear comfortable, two-piece clothing.  If you have any allergies, tell your care team.  Bring any previous mammograms from other facilities or have them mailed to the breast center. Three-dimensional (3D) mammograms are available at [place] locations in [facility], [place], [place], [facility], [place], [place], and [place]. [facility] locations include [place] [facility] in [place]. Benefits of 3D mammograms include: - Improved rate of cancer detection - Decreases your chance of having to go back for more tests, which means fewer: - \"False-positive\" results (This means that there is an abnormal area but it isn't cancer.) - Invasive testing procedures, such as a biopsy or surgery - Can provide clearer images of the breast if you have dense breast tissue. 3D mammography is an optional exam that anyone can have with a 2D mammogram. It doesn't replace or take the place of a 2D mammogram. 2D mammograms remain an effective screening test for all women.  Not all insurance companies cover " the cost of a 3D mammogram. Check with your insurance.            Jul 08, 2019  3:00 PM CDT   (Arrive by 2:45 PM)   Return Visit with Delphine Alegria MD   [facility] ([facility])    [address]  [address]  [facility] [facility] [address] [phone]              Who to contact     Please call your clinic at [phone] to:    Ask questions about your health    Make or cancel appointments    Discuss your medicines    Learn about your test results    Speak to your doctor            Additional Information About Your Visit        [employer] Information     My [employer] gives you secure access to your electronic health record. If you see a primary care provider, you can also send messages to your care team and make appointments. If you have questions, please call your primary care clinic.  If you do not have a primary care provider, please call [phone] and they will assist you.      My [employer] is an electronic gateway that provides easy, online access to your medical records. With My [employer], you can request a clinic appointment, read your test results, renew a prescription or communicate with your care team.     To access your existing account, please contact your [facility] or call [phone] for assistance.        Care EveryWhere ID     This is your Care EveryWhere ID. This could be used by other organizations to access your [facility] medical records  [id number]        Your Vitals Were     Pulse                   94            Blood Pressure from Last 3 Encounters:   08/23/18 127/79   07/09/18 122/87   03/27/18 122/78    Weight from Last 3 Encounters:   07/09/18 71.8 kg (158 lb 6.4 oz)   03/27/18 70 kg (154 lb 5 oz)   12/26/17 68.3 kg (150 lb 8 oz)              Today, you had the following     No orders found for display       Primary Care Provider Office Phone # Fax #    Maritza Lwoe [phone] [address]  [facility] [address]        Equal Access to Services     ELENA HASTINGS : Hadii aad ku hadgarydayanara Magalieeliel, warakeshda elizarabellaha, qajuniorta cedricjeremiepresley ogcarolinapersley, chico graysonchloetamela goldsmith. So [facility] [phone].    ATENCIÓN: Si habla español, tiene a isidro disposición servicios gratuitos de asistencia lingüística. Ll[facility] [phone].    We comply with applicable federal civil rights laws and [place] laws. We do not discriminate on the basis of race, color, national origin, age, disability, sex, sexual orientation, or gender identity.            Thank you!     Thank you for choosing [facility]ER[facility]  for your care. Our goal is always to provide you with excellent care. Hearing back from our patients is one way we can continue to improve our services. Please take a few minutes to complete the written survey that you may receive in the mail after your visit with us. Thank you!             Your Updated Medication List - Protect others around you: Learn how to safely use, store and throw away your medicines at www.disposemymeds.org.          This list is accurate as of 8/23/18 10:34 AM.  Always use your most recent med list.                   Brand Name Dispense Instructions for use Diagnosis    CALCIUM PO      Take 2-4 tablets by mouth daily        cholecalciferol 1000 UNIT tablet    vitamin D3    30 tablet    Take by mouth daily        estradiol 10 MCG Tabs vaginal tablet    YUVAFEM    12 tablet    INSERT 1 TABLET VAGINALLY THREE TIMES WEEKLY.    Vaginal dryness       letrozole 2.5 MG tablet    FEMARA    90 tablet    Take 1 tablet (2.5 mg) by mouth daily    Personal history of malignant neoplasm of breast       order for DME     1 each    Equipment being ordered: Compression bra, compression luis armando/tank for truncal edema after lumpectomy and radiation for breast CA.    Lymphedema       vitamin B complex with vitamin C Tabs tablet      Take 1 tablet by mouth daily        zolpidem 5 MG tablet     AMBIEN    60 tablet    Take 1 tablet (5 mg) by mouth nightly as needed for sleep    Other insomnia

## 2018-08-23 NOTE — LETTER
8/23/2018       RE: Namita Hernandez  [address]  [place] [address]     Dear Colleague,    Thank you for referring your patient, Namita Hernandez, to the [facility] at [facility]. Please see a copy of my visit note below.    DERMATOLOGY EXCISION PROCEDURE NOTE    Dermatology Problem List:  1. Last total body skin exam: 7/16/2018    2. iSK, s/p cryo  3. nBCC, left posterior shoulder, s/p shave bx 7/16/2018, excision 8/23/18    NAME OF PROCEDURE: Excision intermediate layered linear closure  Staff surgeon: Alen Upton MD    Resident: Mitra Kent MD, PGY-3  Scrub Nurse: Lora Lamb LPN      PRE-OPERATIVE DIAGNOSIS:  Nodular Basal Cell Carcinoma  POST-OPERATIVE DIAGNOSIS: same   FINAL EXCISION SIZE(DEFECT SIZE): 2.2 cm, with 4 mm margin   FINAL REPAIR LENGTH:  4.8 cm     INDICATIONS: This patient presented with a 1.2cm Basal Cell Carcinoma of the left posterior shoulder. Excision was indicated. We discussed the principles of treatment and most likely complications including scarring, bleeding, infection, incomplete excision, wound dehiscence, pain, nerve damage, and recurrence. Informed consent was obtained and the patient underwent the procedure as follows:    PROCEDURE: The patient was taken to the operative suite. Time-out was performed.  The treatment area was anesthetized with 1% lidocaine and epinephrine (1:100,000). The area was prepped with Chlorhexidine and rinsed with sterile saline and draped with sterile towels. The lesion was delineated and excised down to subcutaneous fat in a elliptical manner. Hemostasis was obtained by electrocoagulation.     REPAIR: An intermediate layered linear closure was selected as the procedure which would maximally preserve both function and cosmesis.    After the excision of the tumor, the area was carefully undermined. Hemostasis was obtained with electrocoagulation.  Closure was oriented so that the wound was  in the patient's natural skin tension lines. The subcutaneous and dermal layers were then closed with 4-0 Vicryl sutures. The epidermis was then carefully approximated along the length of the wound using 4-0 running subcuticular sutures.     The final wound length was 4.8 cm. A total of 9 ml of anesthesia was administered for all surgical sites. Estimated blood loss was less than 10 ml for all surgical sites. A sterile pressure dressing was applied and wound care instructions, with a written handout, were given. The patient was [facility] from the [facility] alert and ambulatory.    Follow-up in 6 mo for skin check. No suture removal needed.    Dr. Upton was immediately available for the entire surgery and was physicially present for the key portions of the procedure.    Anatomic Pathology Results: pending    Staff Involved: Alen Upton MD        I talked with and examined Namita Hernandez and I agree with the assessment and the plan. I was present for key portions of the procedure. GULSHAN Upton MD.      Again, thank you for allowing me to participate in the care of your patient.      Sincerely,    Mitra Kent MD

## 2018-08-23 NOTE — NURSING NOTE
Lidocaine-epinephrine 1-1:084253 % injection   9mL once for one use, starting 8/23/2018 ending 8/23/2018,  2mL disp, R-0, injection  Injected by Neelam Chavez, and pressure bandage applied to excision site.  Wound care gone over with patient. Patient understood and had no further questions or concerns.      Genevieve Lamb LPN

## 2018-08-23 NOTE — PATIENT INSTRUCTIONS

## 2018-08-23 NOTE — PROGRESS NOTES
DERMATOLOGY EXCISION PROCEDURE NOTE    Dermatology Problem List:  1. Last total body skin exam: 7/16/2018    2. iSK, s/p cryo  3. nBCC, left posterior shoulder, s/p shave bx 7/16/2018, excision 8/23/18    NAME OF PROCEDURE: Excision intermediate layered linear closure  Staff surgeon: Alen Upton MD    Resident: Mitra Kent MD, PGY-3  Scrub Nurse: Lora Lamb LPN      PRE-OPERATIVE DIAGNOSIS:  Nodular Basal Cell Carcinoma  POST-OPERATIVE DIAGNOSIS: same   FINAL EXCISION SIZE(DEFECT SIZE): 2.2 cm, with 4 mm margin   FINAL REPAIR LENGTH:  4.8 cm     INDICATIONS: This patient presented with a 1.2cm Basal Cell Carcinoma of the left posterior shoulder. Excision was indicated. We discussed the principles of treatment and most likely complications including scarring, bleeding, infection, incomplete excision, wound dehiscence, pain, nerve damage, and recurrence. Informed consent was obtained and the patient underwent the procedure as follows:    PROCEDURE: The patient was taken to the operative suite. Time-out was performed.  The treatment area was anesthetized with 1% lidocaine and epinephrine (1:100,000). The area was prepped with Chlorhexidine and rinsed with sterile saline and draped with sterile towels. The lesion was delineated and excised down to subcutaneous fat in a elliptical manner. Hemostasis was obtained by electrocoagulation.     REPAIR: An intermediate layered linear closure was selected as the procedure which would maximally preserve both function and cosmesis.    After the excision of the tumor, the area was carefully undermined. Hemostasis was obtained with electrocoagulation.  Closure was oriented so that the wound was in the patient's natural skin tension lines. The subcutaneous and dermal layers were then closed with 4-0 Vicryl sutures. The epidermis was then carefully approximated along the length of the wound using 4-0 running subcuticular sutures.     The final wound length was 4.8  cm. A total of 9 ml of anesthesia was administered for all surgical sites. Estimated blood loss was less than 10 ml for all surgical sites. A sterile pressure dressing was applied and wound care instructions, with a written handout, were given. The [facility] alert and ambulatory.    Follow-up in 6 mo for skin check. No suture removal needed.    Dr. Upton was immediately available for the entire surgery and was physicially present for the key portions of the procedure.    Anatomic Pathology Results: pending    Staff Involved: Alen Upton MD

## 2018-08-29 LAB — COPATH REPORT: NORMAL

## 2018-08-30 NOTE — PROGRESS NOTES
I talked with and examined Namita Hernandez and I agree with the assessment and the plan. I was present for key portions of the procedure. GULSHAN Upton MD.

## 2018-10-22 DIAGNOSIS — N89.8 VAGINAL DRYNESS: ICD-10-CM

## 2018-10-23 RX ORDER — ESTRADIOL 10 UG/1
INSERT VAGINAL
Qty: 12 TABLET | Refills: 1 | Status: SHIPPED | OUTPATIENT
Start: 2018-10-23 | End: 2019-01-21

## 2018-12-18 ENCOUNTER — OFFICE VISIT (OUTPATIENT)
Dept: FAMILY MEDICINE | Facility: CLINIC | Age: 63
End: 2018-12-18
Payer: COMMERCIAL

## 2018-12-18 VITALS
RESPIRATION RATE: 16 BRPM | TEMPERATURE: 98.3 F | WEIGHT: 160.4 LBS | OXYGEN SATURATION: 96 % | HEIGHT: 65 IN | BODY MASS INDEX: 26.73 KG/M2 | HEART RATE: 68 BPM | SYSTOLIC BLOOD PRESSURE: 122 MMHG | DIASTOLIC BLOOD PRESSURE: 77 MMHG

## 2018-12-18 DIAGNOSIS — H81.11 BENIGN PAROXYSMAL POSITIONAL VERTIGO OF RIGHT EAR: Primary | ICD-10-CM

## 2018-12-18 DIAGNOSIS — R12 HEARTBURN: ICD-10-CM

## 2018-12-18 ASSESSMENT — ANXIETY QUESTIONNAIRES
5. BEING SO RESTLESS THAT IT IS HARD TO SIT STILL: NOT AT ALL
3. WORRYING TOO MUCH ABOUT DIFFERENT THINGS: NOT AT ALL
2. NOT BEING ABLE TO STOP OR CONTROL WORRYING: NOT AT ALL
1. FEELING NERVOUS, ANXIOUS, OR ON EDGE: NOT AT ALL
7. FEELING AFRAID AS IF SOMETHING AWFUL MIGHT HAPPEN: NOT AT ALL
IF YOU CHECKED OFF ANY PROBLEMS ON THIS QUESTIONNAIRE, HOW DIFFICULT HAVE THESE PROBLEMS MADE IT FOR YOU TO DO YOUR WORK, TAKE CARE OF THINGS AT HOME, OR GET ALONG WITH OTHER PEOPLE: NOT DIFFICULT AT ALL
GAD7 TOTAL SCORE: 0
6. BECOMING EASILY ANNOYED OR IRRITABLE: NOT AT ALL

## 2018-12-18 ASSESSMENT — MIFFLIN-ST. JEOR: SCORE: 1288.45

## 2018-12-18 ASSESSMENT — PATIENT HEALTH QUESTIONNAIRE - PHQ9
SUM OF ALL RESPONSES TO PHQ QUESTIONS 1-9: 2
5. POOR APPETITE OR OVEREATING: NOT AT ALL

## 2018-12-18 NOTE — NURSING NOTE
"63 year old  Chief Complaint   Patient presents with     Ear Problem     Dizzy, Urgent Care said she had BBPV, still having dizziness, pain on right ear, getting over a cold, x2 weeks       Blood pressure 122/77, pulse 68, temperature 98.3  F (36.8  C), temperature source Oral, resp. rate 16, height 1.651 m (5' 5\"), weight 72.8 kg (160 lb 6.4 oz), SpO2 96 %, not currently breastfeeding. Body mass index is 26.69 kg/m .  BP completed using cuff size:    Patient Active Problem List   Diagnosis     Breast cancer, right breast (H)     Multiple thyroid nodules     Cervical adenopathy     Elbow pain, left     Right shoulder pain     Hypovitaminosis D     Senile osteoporosis     Parathyroid abnormality (H)     History of colonic polyps       Wt Readings from Last 2 Encounters:   18 72.8 kg (160 lb 6.4 oz)   18 71.8 kg (158 lb 6.4 oz)     BP Readings from Last 3 Encounters:   18 122/77   18 127/79   18 122/87       Allergies   Allergen Reactions     Sulfa Drugs Hives       Current Outpatient Medications   Medication     CALCIUM PO     cholecalciferol (VITAMIN D) 1000 UNIT tablet     estradiol (YUVAFEM) 10 MCG TABS vaginal tablet     letrozole (FEMARA) 2.5 MG tablet     order for DME     vitamin  B complex with vitamin C (VITAMIN  B COMPLEX) TABS     zolpidem (AMBIEN) 5 MG tablet     No current facility-administered medications for this visit.        Social History     Tobacco Use     Smoking status: Former Smoker     Last attempt to quit: 1986     Years since quittin.9     Smokeless tobacco: Never Used   Substance Use Topics     Alcohol use: Yes     Comment: 2 glasses of wine daily     Drug use: No       Honoring Choices - Health Care Directive Guide offered to patient at time of visit.    Health Maintenance Due   Topic Date Due     HIV SCREEN (SYSTEM ASSIGNED)  1974     DTAP/TDAP/TD IMMUNIZATION (1 - Tdap) 1981     ZOSTER IMMUNIZATION (2 of 3) 2017     INFLUENZA " VACCINE (1) 09/01/2018       Immunization History   Administered Date(s) Administered     HepB 05/28/2014     Influenza Vaccine IM 3yrs+ 4 Valent IIV4 10/03/2016, 11/10/2017     OPV, trivalent, live 06/20/1991     Tdap (Adacel,Boostrix) 07/18/1991       Lab Results   Component Value Date    PAP NIL 04/15/2016         Recent Labs   Lab Test 11/10/17  1014 08/05/16  1339 03/23/16  1513  05/06/15  1005  02/05/15  0937   LDL  --   --   --   --   --   --  82   HDL  --   --   --   --   --   --  82   TRIG  --   --   --   --   --   --  46   ALT 28  --   --   --   --   --   --    CR 0.69  --  0.66  --  0.67   < >  --    GFRESTIMATED 86  --  >90  Non  GFR Calc    --  >90  Non  GFR Calc     < >  --    GFRESTBLACK >90  --  >90  African American GFR Calc    --  >90   GFR Calc     < >  --    ALBUMIN 4.0  --   --   --   --   --   --    POTASSIUM 4.2  --   --   --  4.2  --   --    TSH 3.66 2.38  --    < >  --   --   --     < > = values in this interval not displayed.       PHQ-2 ( 1999 Pfizer) 12/18/2018 8/23/2018   Q1: Little interest or pleasure in doing things 0 0   Q2: Feeling down, depressed or hopeless 0 0   PHQ-2 Score 0 0       PHQ-9 SCORE 12/18/2018   PHQ-9 Total Score 2       JL-7 SCORE 12/18/2018   Total Score 0       No flowsheet data found.      Radha Cedeño, [facility]  December 18, 2018 3:08 PM

## 2018-12-18 NOTE — PATIENT INSTRUCTIONS
Benign Positional Vertigo  Normal neurology exam  Can try Epley maneuver at home  Consider Vestibular clinic   Seek care if worsening symptoms

## 2018-12-18 NOTE — PROGRESS NOTES
HPI       Namita Hernandez is a 63 year old  who presents for   Chief Complaint   Patient presents with     Ear Problem     Dizzy, Urgent Care said she had BBPV, still having dizziness, pain on right ear, getting over a cold, x2 weeks     63 year-old female presents to clinic for evaluation of dizziness.  Onset was 2 weeks ago while on a business trip in [place]. She was diagnosed with BPPV right ear. She had BPPV in the past also and noted similar symptoms 2 weeks ago. Symptoms are worse when she lays on her right side so she has been avoiding that.. Had the Epley maneuver for partial relief of symptoms in  and has tried this at home as well. Dizziness is less than previous but still notes especially on right side. Denies gait disturbance, no vision changes, no nausea.   Developed URI symptoms shortly after initial episode and now slight pain right ear.   [facility]    Also had episode of heartburn in the middle of the night last night. Ate spicy food for dinner.  No prior history of heartburn or [place].  Pain was mid sternum and resolved over several hours with sitting up. Not accompanied by sweating or nausea or SOB.     Health Maintenance  Gets regular eye exam.   LPS 4/2016  Mammogram 7/2018  Problem, Medication and Allergy Lists were      Patient Active Problem List    Diagnosis Date Noted     History of colonic polyps 08/17/2017     Priority: Medium     Hypovitaminosis D 08/05/2016     Priority: Medium     Senile osteoporosis 08/05/2016     Priority: Medium     Parathyroid abnormality (H) 08/05/2016     Priority: Medium     Right shoulder pain 05/07/2016     Priority: Medium     Elbow pain, left 11/12/2015     Priority: Medium     Multiple thyroid nodules 05/18/2015     Priority: Medium     Cervical adenopathy 05/18/2015     Priority: Medium     Breast cancer, right breast (H) 04/10/2015     Priority: Medium         Current Outpatient Medications   Medication Sig Dispense Refill     CALCIUM PO Take 2-4  tablets by mouth daily        cholecalciferol (VITAMIN D) 1000 UNIT tablet Take by mouth daily 30 tablet      estradiol (YUVAFEM) 10 MCG TABS vaginal tablet INSERT 1 TABLET VAGINALLY THREE TIMES WEEKLY. 12 tablet 1     letrozole (FEMARA) 2.5 MG tablet Take 1 tablet (2.5 mg) by mouth daily 90 tablet 3     order for DME Equipment being ordered: Compression bra, compression luis armando/tank for truncal edema after lumpectomy and radiation for breast CA. 1 each 0     vitamin  B complex with vitamin C (VITAMIN  B COMPLEX) TABS Take 1 tablet by mouth daily       zolpidem (AMBIEN) 5 MG tablet Take 1 tablet (5 mg) by mouth nightly as needed for sleep 60 tablet 0         Allergies   Allergen Reactions     Sulfa Drugs Hives   .    Patient is   a new patient to this clinic and so  I reviewed/updated the Past Medical History, the Family History and the Social History   Past Medical History:   Diagnosis Date     Breast cancer (H) 4/2015     Colon polyps      Hypovitaminosis D      Kidney stone 2010     Multiple thyroid nodules 2015    right dominant 1.3 cm; benign cytology 5/15     Osteoporosis 7/15    lowest T-score -3.2 33% radius     Pancreatic divisum      Recurrent UTI     since menopause (age 47) 3 x per year     Family History   Problem Relation Age of Onset     Breast Cancer Maternal Grandmother         late 50s     Breast Cancer Mother         late 60s     Thyroid Disease Mother         nodlue     Diabetes Maternal Grandfather      Meniere's disease Father      Thyroid Cancer No family hx of      Glaucoma No family hx of      Macular Degeneration No family hx of      Retinal detachment No family hx of      Social History     Socioeconomic History     Marital status:      Spouse name: None     Number of children: None     Years of education: None     Highest education level: None   Social Needs     Financial resource strain: None     Food insecurity - worry: None     Food insecurity - inability: None     Transportation  "needs - medical: None     Transportation needs - non-medical: None   Occupational History     None   Tobacco Use     Smoking status: Former Smoker     Last attempt to quit: 1986     Years since quittin.9     Smokeless tobacco: Never Used   Substance and Sexual Activity     Alcohol use: Yes     Comment: 2 glasses of wine daily     Drug use: No     Sexual activity: None   Other Topics Concern     None   Social History Narrative     None   .         Review of Systems:   Review of Systems  GEN: denies fever, malaise  HEENT: as per HPI. Denies vison changes, URI symptoms have greatly resolved. Slight right ear pain. No sore throat.  RESP: negative for SOB or cough  CV: midsternal chest discomfort as per HPI. Denies irregular HR, no history of chest pain  GI: as per HPI: heartburn.Denies current discomfort or abdominal pain.  Denies stool pattern changes. States always has slight soreness around umbilicus.   NEURO: as per HPI. No headache. No gait disturbance. No weakness.         Physical Exam:     Vitals:    18 1506   BP: 122/77   Pulse: 68   Resp: 16   Temp: 98.3  F (36.8  C)   TempSrc: Oral   SpO2: 96%   Weight: 72.8 kg (160 lb 6.4 oz)   Height: 1.651 m (5' 5\")     Body mass index is 26.69 kg/m .  Vitals were reviewed and were normal     Physical Exam  GEN: alert female in no acute distress  NEURO: cranial nerves 2-12 intact.  Became dizzy when getting to exam table and with lying down. Did Epley maneuver in room which transiently worsened vertigo but resolved within few minutes.    HEENT: Eyes clear. SVITLANA, EOM intact. Discs crisp.  Ears: Dull gray TMs bilaterally, no erythema, dull LR.  Nose: mild erythema and edema of turbinates bilaterally.  OP: clear.  Neck supple.   Lymph: few lymph nodes anterior cervical and submandibular bilaterally  CV: HRRR, S1, S2, no MRG  GI: abdomen soft with BSx4, no HSM; nontender.     Results:       Assessment and Plan        Namita was seen today for ear " problem.    Diagnoses and all orders for this visit:    Benign paroxysmal positional vertigo of right ear  -     PHYSICAL THERAPY REFERRAL; Future    Heartburn    Return to clinic if recurrent. Avoid spicy foods. Consider atypical cardiac pain, but link to spicy food, so could be heartburn. Might benefit from antacid.     BPPV: discussed etiology. Recommend follow up with vestibular clinic. Must consider Meniere's Disease given +FMH. If not improving, return to clinic for further evaluation. Discussed OTC medications for dizziness. Unlikely these would be beneficial in this case.     Discussed HERRERA: will resolve over time.      There are no discontinued medications.    Options for treatment and follow-up care were reviewed with the patient. Namita Hernandez  engaged in the decision making process and verbalized understanding of the options discussed and agreed with the final plan.    JEY Haywood CNP

## 2018-12-19 ASSESSMENT — ANXIETY QUESTIONNAIRES: GAD7 TOTAL SCORE: 0

## 2018-12-27 ENCOUNTER — OFFICE VISIT (OUTPATIENT)
Dept: INTERNAL MEDICINE | Facility: CLINIC | Age: 63
End: 2018-12-27
Payer: COMMERCIAL

## 2018-12-27 VITALS
SYSTOLIC BLOOD PRESSURE: 126 MMHG | RESPIRATION RATE: 16 BRPM | WEIGHT: 160.8 LBS | DIASTOLIC BLOOD PRESSURE: 76 MMHG | BODY MASS INDEX: 26.76 KG/M2 | HEART RATE: 71 BPM

## 2018-12-27 DIAGNOSIS — J06.9 UPPER RESPIRATORY TRACT INFECTION, UNSPECIFIED TYPE: ICD-10-CM

## 2018-12-27 DIAGNOSIS — H81.11 BENIGN PAROXYSMAL POSITIONAL VERTIGO OF RIGHT EAR: Primary | ICD-10-CM

## 2018-12-27 ASSESSMENT — PAIN SCALES - GENERAL: PAINLEVEL: NO PAIN (0)

## 2018-12-27 NOTE — PROGRESS NOTES
PRIMARY CARE CENTER       SUBJECTIVE:  Namita Hernandez is a 63 year old female with a PMHx of BPPV who comes in for continued dizziness.     Started several weeks ago on business trip. Diagnosed with BPPV, has had this previously as well. Had Epley maneuver with partial improvement in urgent care, but symptoms continued, especially when she laid on her right side. She was seen again in clinic on 12/18/18 for continued dizziness and described onset of URI symptoms and right ear pain. Has a family history of Meniere's. She was referred to physical therapy for BPPV.    She has continued to do Epley maneuver herself, but that hasn't helped. Had Epley when was seen by NP about a week ago, and that seemed to help a little, but not completely. Still getting dizzy a few times a day, this is milder and less frequent than when initially came on. No change in hearing or vision. Denies any tinnitus or low rumbling sounds. Right ear does feel a little achy at times. Still getting over a bad cold, had some nasal and sinus congestion with cold. Denies any ear fullness.     On further discussion, patient states the first time it happened, it felt like the room was spinning, but now it feels like the dizziness is internal, and like she is dizzy. Feels like if she closed her eyes while dizzy she would fall over. She feels unstable with quick movements, like the Epley.      Hasn't had a chance to go to vestibular therapy yet.       Medications and allergies reviewed by me today.     ROS:   Constitutional, HEENT, cardiovascular, pulmonary, gi and gu systems are negative, except as otherwise noted.    OBJECTIVE:    /76   Pulse 71   Resp 16   Wt 72.9 kg (160 lb 12.8 oz)   BMI 26.76 kg/m     Wt Readings from Last 1 Encounters:   12/27/18 72.9 kg (160 lb 12.8 oz)       GENERAL APPEARANCE: healthy, alert and no distress     EYES: EOMI, no nystagmus, PERRL     HENT: ear canals and TM's normal. Erythematous and  edematous nasal mucosa, enlarged middle turbinate, clear nasal discharge. Mouth without ulcers or lesions, oropharynx clear without erythema or exudate.      NECK: no adenopathy, no asymmetry, masses, or scars and thyroid normal to palpation     RESP: breathing comfortably on room ai     CV: regular rate and rhythm, extremities warm and well perfused     ABDOMEN:  soft, nontender, no HSM or masses and bowel sounds normal     NEURO: AOx3. CN's II-XII intact. Normal strength and tone, sensory exam grossly normal, mentation intact and speech normal. Romberg negative, no loss of balance.      PSYCH: mentation appears normal. and affect normal/bright     ASSESSMENT/PLAN:    Namita was seen today for dizziness.    Diagnoses and all orders for this visit:    Benign paroxysmal positional vertigo of right ear  Symptoms consistent with BPPV. No change in hearing or tinnitus, so low suspicion for Meniere's. Possible contribution from concurrent URI/congestion. Given intermittent improvement with Epley maneuvers previously performed, encouraged patient to continue these at home. Also encouraged her to attend vestibular therapy.   - Instructions provided for Epley maneuver  - Patient to follow-up with vestibular therapy (consult previously placed)  - Patient instructed to return to clinic if symptoms do not improve, or if she develops new symptoms, like ear pain, discharge, hearing loss, or tinnits    Upper respiratory tract infection, unspecified type  Already improving. Expect viral in nature.   - Counseled on supportive cares       Pt should return to clinic for f/u as needed if symptoms worsen or fail to improve.      Rhiannon Horan MD  Internal Medicine PGY-3  P: [phone]  Dec 27, 2018    Plan of care discussed with Dr. Guzman.     While the patient was in clinic, I reviewed the pertinent medical history and results.  I discussed the current findings on physical examination, as well as the patient s diagnosis and treatment  plan with the resident and agree with the information as documented with the following exceptions: none.  Elmer Guzman

## 2018-12-27 NOTE — NURSING NOTE
Chief Complaint   Patient presents with     Dizziness     pt is here to discuss dizziness x 3 week        Meg Leonard CMA at 9:34 AM on 12/27/2018

## 2018-12-27 NOTE — PATIENT INSTRUCTIONS
Primary Care Center Phone Number [phone]  Primary Care Center Medication Refill Request Information:  * Please contact your pharmacy regarding ANY request for medication refills.  ** [facility] Prescription Fax = [phone]  * Please allow 3 business days for routine medication refills.  * Please allow 5 business days for controlled substance medication refills.     Primary Care Center Test Result notification information:  *You will be notified with in 7-10 days of your appointment day regarding the results of your test.  If you are on MyChart you will be notified as soon as the provider has reviewed the results and signed off on them.               [facility]         Internal Medicine Resident                   Continuity Clinic    Who We Are    Resident Continuity Clinic is a part of the [facility].  Resident physicians see patients independently and establish a relationship with them over the course of their three-year residency program.  As with the Primary Care Clinic, our Resident Continuity Clinic models a group practice.  If your doctor is not available, you will be able to see another resident physician.  At the end of a resident s training, patients will be transitioned to a new resident physician for ongoing care.     We treat patients with a wide array of medical needs from routine physicals, to acute illnesses, to diabetes and blood pressure management, to complex medical illness.  What is a Resident Physician?    Resident physicians hold medical degrees and are doctors. They are training to become specialists in Internal Medicine. They work under the supervision of board-certified faculty physicians.  Expectations for Your Care    We strive to provide accessible, quality care at all times.    In order to provide this care, it is best to see your primary care resident doctor consistently rather switching between providers.  In the event you do see another physician, you  should schedule a follow-up visit with your usual primary care doctor.    If you are transitioning your care from another clinic, it is helpful to have your records available for your doctor to review.    We do not prescribe controlled substances, such as ADD medications or narcotic pain medications, on your first visit.  We will review your health records and concerns prior to devising a treatment plan with you in order to provide the best care.      Clinic Services     Extended clinic hours; patient  to help navigate your visit;  parking; laboratory and imaging services with evening and weekend hours    Multiple medical and surgical specialties in one building    Complementary services, including Nutrition, Integrative Medicine, Pharmacy consultations, Mental and Behavioral Health, Sports Medicine and Physical Therapy    Thank You    We would like to thank you for choosing the [facility] Internal Medicine Resident Continuity Clinic for your primary care. You are making a priceless contribution to the training of the next generation of health care practitioners.     Contact us at [phone] for appointments or questions.    Resident Clinic Hours are Tuesdays and Thursdays, 7:30am-5:00pm    Residents   Prabhu Miller MD  (Male)   Namita Giles MD (Female)  Meg Madera MD   (Female)   Joselyn Giron MD   (Female)   Nash Lopez MD  (Male)   Devin Bay MD  (Male)   Kamari Sprague MD    (Female)   Ben Ramirez MD  (Male)   Luis Armando Weiss MD  (Male)    Tracey Cramer MD  (Female)   Fuad Lu MD  (Male)   Naun Damon MD  (Female)   Marcela Moreno MD   (Female)   Edgard Roach MD  (Male)   Aubrey Yung MD  (Male)   Devin Tobar MD (Male)   Everett Kilpatrick MD  (Male)   Gabriella Hackett MD (Female)    Nuzhat Barillas MD (Female)   Wilder Paul MD  (Male)   Elle Stewart MD    (Female)   Rhiannon Horan MD  (Female)    Supervising  Physicians   MD Maritza De La Cruz, MD Roland Butterfield, MD Namita Mead, MD Alis Cortés, MD Pati Mack, MD Minnie Roca MD            What we talked about today:  - Symptoms seem consistent with BPPV (benign positional paroxysmal vertigo)  - Try doing Epley maneuver at home (instructions provided)  - Follow-up with vestibular therapy  - If symptoms are not improving with the above, or if you start having new symptoms (change in hearing, ringing in ears, pain in ear) let us know and we will reassess

## 2019-01-07 ENCOUNTER — HOSPITAL ENCOUNTER (OUTPATIENT)
Dept: PHYSICAL THERAPY | Facility: CLINIC | Age: 64
Setting detail: THERAPIES SERIES
End: 2019-01-07
Attending: INTERNAL MEDICINE
Payer: COMMERCIAL

## 2019-01-07 PROCEDURE — 97161 PT EVAL LOW COMPLEX 20 MIN: CPT | Mod: GP | Performed by: PHYSICAL THERAPIST

## 2019-01-07 PROCEDURE — 95992 CANALITH REPOSITIONING PROC: CPT | Mod: GP | Performed by: PHYSICAL THERAPIST

## 2019-01-08 NOTE — PROGRESS NOTES
01/07/19 1400   Quick Adds   Quick Adds Vestibular Eval   General Information   Start of Care Date 01/07/19   Referring Physician Delphine Alegria   Orders Evaluate and Treat as Indicated   Order Date 12/18/18   Medical Diagnosis bppv right ear   Onset of illness/injury or Date of Surgery (couple months ago)   Surgical/Medical history reviewed Yes   Pertinent history of current problem (include personal factors and/or comorbidities that impact the POC) Dorian is here - reports, after business trip i felt off.  woke up and sat up and spun.  i had this one other time. went to urgent care, got maneuver. felt better.  but is still bad.     Pertinent Visual History  no issues   Prior level of functional mobility Ambulation   Ambulation active   Current Community Support Family/friend caregiver   Patient role/Employment history Employed   Living environment House/[facility]   Home/Community Accessibility Comments works /travels for work to [place].     Patient/Family Goals Statement sleep in any position, no sxs.    General Information Comments pt is here alone, DORIAN   Fall Risk Screen   Fall screen completed by PT   Have you fallen 2 or more times in the past year? No   Have you fallen and had an injury in the past year? No   System Outcome Measures   Outcome Measures BPPV   Dizziness Handicap Inventory (score out of 100) A decrease in score by 17.18 or greater indicates a clinically significant change in symptoms. 24   Pain   Patient currently in pain No   Cognitive Status Examination   Orientation orientation to person, place and time   Level of Consciousness alert   Follows Commands and Answers Questions 100% of the time   Personal Safety and Judgment intact   Memory intact   Integumentary   Integumentary No deficits were identified   Posture   Posture Forward head position   Range of Motion (ROM)   ROM Comment wfls, neck 80% rotation   Strength   Strength Comments no issues   Bed Mobility   Bed Mobility Comments  slow due to sxs   Transfer Skills   Transfer Comments rises from chair w/out hands   Locomotion   Wheel Chair Mobility Comments n/a   Gait   Gait Comments normal   Gait Special Tests   Gait Special Tests 25 FOOT TIMED WALK   Gait Special Tests 25 Foot Timed Walk   Seconds 7.4   Comments normal   Balance   Balance Comments no issues   Sensory Examination   Sensory Perception no deficits were identified   Coordination   Coordination no deficits were identified   Cervicogenic Screen   Neck ROM 80% of nl rom   Oculomotor Exam   Smooth Pursuit Normal   Saccades Normal   VOR Normal   Infrared Goggle Exam or Frenzel Lense Exam   Vestibular Suppressant in Last 24 Hours? No   Exam completed with Infrared Goggles   Spontaneous Nystagmus Negative   Gaze Evoked Nystagmus Negative   [place]-Hallpike (right) Upbeating R torsional   [place]-Hallpike (right) comments 25 sec w/ sxs   Jeremy-Hallpike (Left) Negative   HSCC Supine Roll Test (Right) Negative   HSCC Supine Roll Test (Left) Negative   BPPV Canal(s) R Posterior   BPPV Type Canalithasis   Planned Therapy Interventions   Planned Therapy Interventions other (see comments)   Planned Therapy Interventions Comment CRM's and self rx.  ther activity   Clinical Impression   Criteria for Skilled Therapeutic Interventions Met yes, treatment indicated   PT Diagnosis R bppv   Influenced by the following impairments sxs, nystagmus   Functional limitations due to impairments hard to move in bed, bend over   Clinical Presentation Stable/Uncomplicated   Clinical Decision Making (Complexity) Low complexity   Therapy Frequency other (see comments)   Predicted Duration of Therapy Intervention (days/wks) up to 5x in 90 days   Risk & Benefits of therapy have been explained Yes   Patient, Family & other staff in agreement with plan of care Yes   Clinical Impression Comments R bppv   Education Assessment   Preferred Learning Style Demonstration   Barriers to Learning No barriers   GOALS   PT Eval Goals 1;2    Goal 1   Goal Identifier sxs   Goal Description DHI to reduce to 10 or less to show improvement in dizzy sxs   Target Date 04/07/19   Goal 2   Goal Identifier bed mobility   Goal Description pt to demo or report bed mob indep w/out sxs and can sleep in any position   Target Date 04/07/19   Total Evaluation Time   PT Eval, Low Complexity Minutes (18480) 20

## 2019-01-17 ENCOUNTER — ONCOLOGY VISIT (OUTPATIENT)
Dept: ONCOLOGY | Facility: CLINIC | Age: 64
End: 2019-01-17
Attending: INTERNAL MEDICINE
Payer: COMMERCIAL

## 2019-01-17 VITALS
WEIGHT: 160.94 LBS | DIASTOLIC BLOOD PRESSURE: 70 MMHG | HEART RATE: 86 BPM | BODY MASS INDEX: 26.78 KG/M2 | RESPIRATION RATE: 16 BRPM | SYSTOLIC BLOOD PRESSURE: 114 MMHG | TEMPERATURE: 98.8 F | OXYGEN SATURATION: 95 %

## 2019-01-17 DIAGNOSIS — Z17.0 MALIGNANT NEOPLASM OF RIGHT BREAST IN FEMALE, ESTROGEN RECEPTOR POSITIVE, UNSPECIFIED SITE OF BREAST (H): Primary | ICD-10-CM

## 2019-01-17 DIAGNOSIS — Z51.81 ENCOUNTER FOR THERAPEUTIC DRUG MONITORING: ICD-10-CM

## 2019-01-17 DIAGNOSIS — C50.911 MALIGNANT NEOPLASM OF RIGHT BREAST IN FEMALE, ESTROGEN RECEPTOR POSITIVE, UNSPECIFIED SITE OF BREAST (H): Primary | ICD-10-CM

## 2019-01-17 DIAGNOSIS — N39.45 CONTINUOUS LEAKAGE OF URINE: ICD-10-CM

## 2019-01-17 PROCEDURE — G0463 HOSPITAL OUTPT CLINIC VISIT: HCPCS | Mod: ZF

## 2019-01-17 PROCEDURE — 99213 OFFICE O/P EST LOW 20 MIN: CPT | Mod: ZP | Performed by: PHYSICIAN ASSISTANT

## 2019-01-17 ASSESSMENT — PAIN SCALES - GENERAL: PAINLEVEL: NO PAIN (0)

## 2019-01-17 NOTE — LETTER
1/17/2019      RE: Namita GÓMEZ [facility]  [address] [place] [address]       MEDICAL ONCOLOGY FOLLOW UP VISIT  January 17, 2019      Namita returns today for followup of a stage I, T1b N0, ER/SC-positive and HER2-negative breast cancer.      HISTORY OF PRESENT ILLNESS: Namita is a 62-year-old woman who comes in today for followup for her breast cancer. Please see prior notes by Dr. Alegria for further detail (06/01/2015).  Briefly, Namita underwent a mammogram and ultrasound.  Mammogram on 04/06/2015 suggested a 1.5 cm abnormal distortion confirmed by ultrasound.  By contrast mammography, this revealed an 18 mm area of abnormality at the 2 o'clock position involving her right breast.  Biopsy was recommended and confirmed an invasive ductal carcinoma, grade 1, ER/SC-positive and HER2-negative.  She underwent a lumpectomy and sentinel lymph node biopsy by Dr. Yoshi Oconnor.  Her final staging was a 6 mm tumor, T1b N0, ER/SC-positive and HER2-negative.  All margins were negative. She completed her RTx (52.4 Gy in 20 sessions 6/31-7/27). She started Tamoxifen in 09/2015. She switched to letrozole July 2018.     INTERVAL HISTORY: She returns today in follow up and feels quite well. She started letrozole. At first, she noticed some body aches in the arms, but this ended up resolving after a few days. Since that time, she has had no arthralgias or myalgias, no sites of pain anywhere. No hot flashes. She is active with exercise (walking, pilates, strength training). She is working on her diet and has cut out some things. She has weight loss goals in place. She continues to have issues with urinary incontinence x years despite pelvic floor exercises, occurs with stresses but also reports a constant drip. Needs to wear panty liners at all times. No other concerns. She is busy with her ill father and stepmother, busy being the central person in the family coordinating both of their cares.  Her 10-point review of systems is otherwise  negative.         PHYSICAL EXAMINATION: /70   Pulse 86   Temp 98.8  F (37.1  C) (Oral)   Resp 16   Wt 73 kg (160 lb 15 oz)   SpO2 95%   BMI 26.78 kg/m       GENERAL:  She is well-appearing, in no apparent distress.   HEENT:  Exam reveals no icterus.  Oropharynx is clear.  There are no ulcers or lesions.   NECK:  Supple.  No cervical, supraclavicular or axillary adenopathy.   HEART:  Regular.   LUNGS:  Clear bilaterally.   ABDOMEN:  Soft, nontender and nondistended.   BREASTS:  Well-healed surgical scar in her right breast with underlying scar tissue,  no mass or nodule palpated in either breast  SKIN: no rashes      No new labs today.    ASSESSMENT AND PLAN:  This 62-year-old woman with a T1b N0, ER/WV-positive and HER2-negative invasive ductal carcinoma, grade 1, involving the right breast, status post lumpectomy and right breast RTx. She is now on Tamoxifen for adjuvant endocrine therapy.    1.  Breast cancer.  [id number], stage IA, start tamoxifen Sept 2015, switched to letrozole July 2018 with plan to complete at least 5 years of endocrine therapy. She is tolerating the AI without any issues. Annual mammogram due July 2019. Continue follow-up every 6 months.      2. Osteoporosis in the radius bone, left spine and neck:  She has previously discussed bisphosphonates with Dr. Alegria when switching to AI. She plans to wait until repeat DEXA this summer before starting any bisphosphonate medications. Order placed. Continue Ca++/vit D, strength exercises, as doing.    3. Diet and exercise. Reviewed goal for 150 minutes moderate intensity activity weekly. She is working on her diet with weight loss goal in place. Showed her some resources for diet, meal planning at the [employer] website's survivorship resources.    4. Urinary incontinence, mixed. Ongoing for several years without any improvement with pelvic floor exercises. Offered urology referral, she would like to go.      Jennifer  NGOC Ty

## 2019-01-17 NOTE — NURSING NOTE
CBC:  Lab Results   Component Value Date    WBC 4.7 11/10/2017     Lab Results   Component Value Date    RBC 4.06 11/10/2017     Lab Results   Component Value Date    HGB 13.1 11/10/2017     Lab Results   Component Value Date    HCT 40.1 11/10/2017     Lab Results   Component Value Date    MCV 99 11/10/2017     Lab Results   Component Value Date    RDW 12.2 11/10/2017     Lab Results   Component Value Date     11/10/2017       Metabolic Panel:  Lab Results   Component Value Date     11/10/2017     Lab Results   Component Value Date    POTASSIUM 4.2 11/10/2017     Lab Results   Component Value Date    CHLORIDE 106 11/10/2017     Lab Results   Component Value Date    PHIL 9.0 11/10/2017     Lab Results   Component Value Date    CO2 29 11/10/2017     Lab Results   Component Value Date    BUN 16 11/10/2017     Lab Results   Component Value Date    CR 0.69 11/10/2017     Lab Results   Component Value Date    GLC 98 11/10/2017       LFT:  Lab Results   Component Value Date    ALT 28 11/10/2017     Lab Results   Component Value Date    AST 18 11/10/2017     Lab Results   Component Value Date    BILITOTAL 0.7 11/10/2017     No results found for: BILIDIRECT  Lab Results   Component Value Date    ALBUMIN 4.0 11/10/2017     Lab Results   Component Value Date    ALKPHOS 66 11/10/2017       Other Results:  No results found for: PSA  Lab Results   Component Value Date    TSH 3.66 11/10/2017    TSH 2.38 08/05/2016     No results found for: MAG  No results found for: LACTDH  No results found for: CEA  No results found for:   No results found for: DW5026  No results found for:

## 2019-01-17 NOTE — NURSING NOTE
"Oncology Rooming Note    January 17, 2019 3:25 PM   Namita Hernandez is a 63 year old female who presents for:    Chief Complaint   Patient presents with     Oncology Clinic Visit     Breast Ca      Initial Vitals: /70   Pulse 86   Temp 98.8  F (37.1  C) (Oral)   Resp 16   Wt 73 kg (160 lb 15 oz)   SpO2 95%   BMI 26.78 kg/m   Estimated body mass index is 26.78 kg/m  as calculated from the following:    Height as of 12/18/18: 1.651 m (5' 5\").    Weight as of this encounter: 73 kg (160 lb 15 oz). Body surface area is 1.83 meters squared.  No Pain (0) Comment: Data Unavailable   No LMP recorded. Patient is postmenopausal.  Allergies reviewed: Yes  Medications reviewed: Yes    Medications: Medication refills not needed today.  Pharmacy name entered into [employer]: [id number] - [place], [place] - [facility]    Clinical concerns: no  Jennifer  was notified.    8   minutes for nursing intake (face to face time)     Daly Mcnally MA              "

## 2019-01-17 NOTE — PATIENT INSTRUCTIONS
"[employer] - click on Survivorship, and then go to resources, click on \"12 week healthy weight challenge\"  "

## 2019-01-17 NOTE — PROGRESS NOTES
MEDICAL ONCOLOGY FOLLOW UP VISIT  January 17, 2019      Namita returns today for followup of a stage I, T1b N0, ER/NE-positive and HER2-negative breast cancer.      HISTORY OF PRESENT ILLNESS: Namita is a 62-year-old woman who comes in today for followup for her breast cancer. Please see prior notes by Dr. Alegria for further detail (06/01/2015).  Briefly, Namita underwent a mammogram and ultrasound.  Mammogram on 04/06/2015 suggested a 1.5 cm abnormal distortion confirmed by ultrasound.  By contrast mammography, this revealed an 18 mm area of abnormality at the 2 o'clock position involving her right breast.  Biopsy was recommended and confirmed an invasive ductal carcinoma, grade 1, ER/NE-positive and HER2-negative.  She underwent a lumpectomy and sentinel lymph node biopsy by Dr. Yoshi Oconnor.  Her final staging was a 6 mm tumor, T1b N0, ER/NE-positive and HER2-negative.  All margins were negative. She completed her RTx (52.4 Gy in 20 sessions 6/31-7/27). She started Tamoxifen in 09/2015. She switched to letrozole July 2018.     INTERVAL HISTORY: She returns today in follow up and feels quite well. She started letrozole. At first, she noticed some body aches in the arms, but this ended up resolving after a few days. Since that time, she has had no arthralgias or myalgias, no sites of pain anywhere. No hot flashes. She is active with exercise (walking, pilates, strength training). She is working on her diet and has cut out some things. She has weight loss goals in place. She continues to have issues with urinary incontinence x years despite pelvic floor exercises, occurs with stresses but also reports a constant drip. Needs to wear panty liners at all times. No other concerns. She is busy with her ill father and stepmother, busy being the central person in the family coordinating both of their cares.  Her 10-point review of systems is otherwise negative.         PHYSICAL EXAMINATION: /70   Pulse 86   Temp 98.8  F  (37.1  C) (Oral)   Resp 16   Wt 73 kg (160 lb 15 oz)   SpO2 95%   BMI 26.78 kg/m      GENERAL:  She is well-appearing, in no apparent distress.   HEENT:  Exam reveals no icterus.  Oropharynx is clear.  There are no ulcers or lesions.   NECK:  Supple.  No cervical, supraclavicular or axillary adenopathy.   HEART:  Regular.   LUNGS:  Clear bilaterally.   ABDOMEN:  Soft, nontender and nondistended.   BREASTS:  Well-healed surgical scar in her right breast with underlying scar tissue,  no mass or nodule palpated in either breast  SKIN: no rashes      No new labs today.    ASSESSMENT AND PLAN:  This 62-year-old woman with a T1b N0, ER/MD-positive and HER2-negative invasive ductal carcinoma, grade 1, involving the right breast, status post lumpectomy and right breast RTx. She is now on Tamoxifen for adjuvant endocrine therapy.    1.  Breast cancer.  [id number], stage IA, start tamoxifen Sept 2015, switched to letrozole July 2018 with plan to complete at least 5 years of endocrine therapy. She is tolerating the AI without any issues. Annual mammogram due July 2019. Continue follow-up every 6 months.      2. Osteoporosis in the radius bone, left spine and neck:  She has previously discussed bisphosphonates with Dr. Alegria when switching to AI. She plans to wait until repeat DEXA this summer before starting any bisphosphonate medications. Order placed. Continue Ca++/vit D, strength exercises, as doing.    3. Diet and exercise. Reviewed goal for 150 minutes moderate intensity activity weekly. She is working on her diet with weight loss goal in place. Showed her some resources for diet, meal planning at the [employer] website's survivorship resources.    4. Urinary incontinence, mixed. Ongoing for several years without any improvement with pelvic floor exercises. Offered urology referral, she would like to go.      Jennifer Ty PA-C

## 2019-01-21 DIAGNOSIS — N89.8 VAGINAL DRYNESS: ICD-10-CM

## 2019-01-21 RX ORDER — ESTRADIOL 10 UG/1
INSERT VAGINAL
Qty: 12 TABLET | Refills: 1 | Status: SHIPPED | OUTPATIENT
Start: 2019-01-21 | End: 2019-03-14

## 2019-01-22 ENCOUNTER — HOSPITAL ENCOUNTER (OUTPATIENT)
Dept: PHYSICAL THERAPY | Facility: CLINIC | Age: 64
Setting detail: THERAPIES SERIES
End: 2019-01-22
Attending: RADIOLOGY
Payer: COMMERCIAL

## 2019-01-22 PROCEDURE — 97112 NEUROMUSCULAR REEDUCATION: CPT | Mod: GP | Performed by: PHYSICAL THERAPIST

## 2019-03-14 DIAGNOSIS — N89.8 VAGINAL DRYNESS: ICD-10-CM

## 2019-03-14 RX ORDER — ESTRADIOL 10 UG/1
TABLET VAGINAL
Qty: 12 TABLET | Refills: 1 | Status: SHIPPED | OUTPATIENT
Start: 2019-03-14 | End: 2019-04-03

## 2019-03-19 ENCOUNTER — OFFICE VISIT (OUTPATIENT)
Dept: INTERNAL MEDICINE | Facility: CLINIC | Age: 64
End: 2019-03-19
Payer: COMMERCIAL

## 2019-03-19 VITALS
DIASTOLIC BLOOD PRESSURE: 86 MMHG | SYSTOLIC BLOOD PRESSURE: 135 MMHG | BODY MASS INDEX: 26.94 KG/M2 | HEART RATE: 71 BPM | WEIGHT: 161.9 LBS | RESPIRATION RATE: 18 BRPM

## 2019-03-19 DIAGNOSIS — M53.3 SACROILIAC JOINT PAIN: Primary | ICD-10-CM

## 2019-03-19 ASSESSMENT — PAIN SCALES - GENERAL: PAINLEVEL: NO PAIN (0)

## 2019-03-19 NOTE — PROGRESS NOTES
[facility]IMARY [facility]ENTER       SUBJECTIVE:  Namita Hernandez is a 64 year old female who comes in for R hip pain. It has bothered her for quite some time. It never stays the same, sometimes gets better. She feels sometimes it's in a specific place in her upper hip and sometimes it is in her back. Some exercise aggravates it, some makes it better. It moves around and comes/goes in severity. It gets very stiff in the night and wakes her up at 430AM with a good deal of regularity.     Cracked sacrum in [id number] after thrown from horse.     Sometimes feels something go all the way down her leg and makes her act goofy. No numbness/tingling. No weakness.     Bladder-leaks a lot, has taken to wearing pads every day. Will leak urine with movement, sneezing, etc. Did get a referral to go to urology.     No fevers/chills, no weight loss.     May need antibiotics for UTI prophy at some point before traveling.     Past Medical History:   Diagnosis Date     Breast cancer (H) 4/2015     Colon polyps      Hypovitaminosis D      Kidney stone 2010     Multiple thyroid nodules 2015    right dominant 1.3 cm; benign cytology 5/15     Osteoporosis 7/15    lowest T-score -3.2 33% radius     Pancreatic divisum      Recurrent UTI     since menopause (age 47) 3 x per year     Medications and allergies reviewed by me today.     ROS:   Constitutional, HEENT, cardiovascular, pulmonary, gi and gu systems are negative, except as otherwise noted.    OBJECTIVE:    /86 (BP Location: Right arm, Patient Position: Sitting, Cuff Size: Adult Regular)   Pulse 71   Resp 18   Wt 73.4 kg (161 lb 14.4 oz)   Breastfeeding? No   BMI 26.94 kg/m     Wt Readings from Last 1 Encounters:   03/19/19 73.4 kg (161 lb 14.4 oz)     Gen: Pleasant female, in NAD  MSK: SLR negative bilaterally, full ROM in lumbar spine, TTP over R SI  Neuro: AOx3, 5/5 strength in upper/lower extremities bilaterally     ASSESSMENT/PLAN:    Namita was seen today for hip  right. No red flag symptoms. Will start with PT. If no improvement, will get xray +/- MRI and consider possible SI joint injection.     Diagnoses and all orders for this visit:    Sacroiliac joint pain  -     PHYSICAL THERAPY REFERRAL; Future     Stress incontinence   Discussed seeing urology vs ob/gyn    Pt should return to clinic for f/u with me in PRN     Maritza Lowe MD  03/19/19

## 2019-03-19 NOTE — NURSING NOTE
Chief Complaint   Patient presents with     Hip right     Patient is here for right hip pain       Shane Richardson CMA (AAMA) at 4:59 PM on 3/19/2019

## 2019-04-01 ENCOUNTER — PRE VISIT (OUTPATIENT)
Dept: UROLOGY | Facility: CLINIC | Age: 64
End: 2019-04-01

## 2019-04-01 ENCOUNTER — THERAPY VISIT (OUTPATIENT)
Dept: PHYSICAL THERAPY | Facility: CLINIC | Age: 64
End: 2019-04-01
Payer: COMMERCIAL

## 2019-04-01 DIAGNOSIS — M53.3 SACROILIAC JOINT PAIN: ICD-10-CM

## 2019-04-01 DIAGNOSIS — M54.41 RIGHT-SIDED LOW BACK PAIN WITH RIGHT-SIDED SCIATICA: ICD-10-CM

## 2019-04-01 PROCEDURE — 97530 THERAPEUTIC ACTIVITIES: CPT | Mod: GP | Performed by: PHYSICAL THERAPIST

## 2019-04-01 PROCEDURE — 97161 PT EVAL LOW COMPLEX 20 MIN: CPT | Mod: GP | Performed by: PHYSICAL THERAPIST

## 2019-04-01 PROCEDURE — 97112 NEUROMUSCULAR REEDUCATION: CPT | Mod: GP | Performed by: PHYSICAL THERAPIST

## 2019-04-01 NOTE — TELEPHONE ENCOUNTER
MEDICAL RECORDS REQUEST   [facility]  Urology Clinic  [address] [place] [address]  PHONE: [phone]  Fax: [phone]        FUTURE VISIT INFORMATION                                                   Namita GÓMEZ Mary, : 1955 scheduled for future visit at [facility]    APPOINTMENT INFORMATION:    Date: 2019    Provider:  ROLANDO GRIMALDO    Reason for Visit/Diagnosis: LEAKING OF URINE    REFERRAL INFORMATION:    Referring provider:  SMITH TOPETE    Specialty: NP    Referring providers clinic:  ONCOLOGY CLINIC    Clinic contact number: [phone];     RECORDS REQUESTED FOR VISIT                                                     NOTES  STATUS/DETAILS   OFFICE NOTE from referring provider  yes   OFFICE NOTE from other specialist  no   DISCHARGE SUMMARY from hospital  no   DISCHARGE REPORT from the ER  no   OPERATIVE REPORT  no   MEDICATION LIST  yes       PRE-VISIT CHECKLIST      Record collection complete Yes   Appointment appropriately scheduled           (right time/right provider) Yes   MyChart activation Yes   Questionnaire complete If no, please explain IN PROCESS     Completed by: Xochitl Zavala

## 2019-04-01 NOTE — PROGRESS NOTES
[facility]  System  Physical Exam  General   [facility]      Physical Therapy Initial Examination/Evaluation April 1, 2019   Namita Hernandez is a 64 year old female referred to physical therapy by Dr. Maritza Villegas for treatment of SI joint pain  with Precautions/Restrictions/MD instructions E&T   Therapist Assessment:   Clinical Impression: Pt presents to [facility] with primary complaint of low back/R hip pain.  Per clinical examination, pt with full lumbar and hip ROM.  Unable to reproduce pain with low back or hip special testing.  Pt does demonstrate some core weakness and has more recent symptoms in pelvic floor, which may be contributing to pt's current pain symptoms. Pt will benefit from skilled physical therapy for stabilization program as well as ongoing education for proper body mechanics and assessment of pelvic floor as needed.       Subjective: Pt reports symtpoms have been present for years. She is consistently waking up at 4:30 am from pain. Pain is central SI joint and R buttock. She was thrown off a horse in 2005 and cracked coccyx. Sometimes gets symtpoms down into R foot.   DOI/onset: 3/19/2019-MD order   Mechanism of injury: unknown    DOS NA   Related PMH: some LBP, history of breast CA Previous treatment: None-does see  for exercise    Imaging: NA   Chief Complaint: Low Back/R hip pain    Pain: rest 0 /10, activity 3/10  Described as: Achy Alleviated by: unsure Exacerbated by: maybe pilates, rolling over in bed, bridge position   Progression of symptoms since initial onset: staying the same Time of day when pain is worse: variable; typically at night   Sleeping: wakes at 4:30 every morning; has tried sleeping with pillow between legs   Social history: , has step-children; does caregiving for elderly parents   Occupation: Exec  Job duties: Computer work, Prolonged sitting   Current HEP/exercise regimen: wt room, pilates, yoga, works with    Patient's goals  are Decrease pain, find out what is causing pain    Other pertinent PMH: Breast CA, Menopausal, pain at night, urinary incontinence General health as reported by patient: Excellent   Return to MD: prn      HIP EVALUATION    Static Posture  Foot: Pes planus/cavus: unremarkable    Knee: Varus/Valgus: unremarkable   Hip: Adduction/IR: unremarkable       Dynamic Movement Screen  Single leg stance observations: Decreased R functional hip strength; able to hold ~20s bilat      Range of Motion  Lumbar spine screen: WNL      HIP ROM: WNL bilat     Left Right  Hamstring ROM Hypertonic bilat        Hip and Knee Strength   MMT Hip Abduction Hip Extension Hip Flexion    Left 4/5 4-/5 4/5   Right 4/5 4-/5 4/5       Special Tests   FADIR JYOTHI Log Roll Scouring   Left - - - -   Right - - - -     SI Joint: Negative     PALPATION  Left: WNL  Right: Some discomfort over piriformis but not reproduction of pain       Assessment/Plan:  Patient is a 64 year old female with SI Joint complaints.    Patient has the following significant findings with corresponding treatment plan.                Diagnosis 1:  SI Joint pain   Pain -  hot/cold therapy, manual therapy, self management, education and home program  Decreased strength - therapeutic exercise, therapeutic activities and home program  Impaired muscle performance - neuro re-education and home program  Decreased function - therapeutic activities and home program    Therapy Evaluation Codes:   1) History comprised of:   Personal factors that impact the plan of care:      Past/current experiences and Time since onset of symptoms.    Comorbidity factors that impact the plan of care are:      Cancer, Menopausal and Pain at night/rest.     Medications impacting care: Aromatase Inhibitor.  2) Examination of Body Systems comprised of:   Body structures and functions that impact the plan of care:      Hip, Lumbar spine and Sacral illiac joint.   Activity limitations that impact the plan of  care are:      Sitting, Squatting/kneeling, Walking and Sleeping.  3) Clinical presentation characteristics are:   Stable/Uncomplicated.  4) Decision-Making    Low complexity using standardized patient assessment instrument and/or measureable assessment of functional outcome.  Cumulative Therapy Evaluation is: Low complexity.    Previous and current functional limitations:  (See Goal Flow Sheet for this information)    Short term and Long term goals: (See Goal Flow Sheet for this information)     Communication ability:  Patient appears to be able to clearly communicate and understand verbal and written communication and follow directions correctly.  Treatment Explanation - The following has been discussed with the patient:   RX ordered/plan of care  Anticipated outcomes  Possible risks and side effects  This patient would benefit from PT intervention to resume normal activities.   Rehab potential is good.    Frequency:  1 X week, once daily  Duration:  for 6 weeks  Discharge Plan:  Achieve all LTG.  Independent in home treatment program.  Reach maximal therapeutic benefit.    Please refer to the daily flowsheet for treatment today, total treatment time and time spent performing 1:1 timed codes.

## 2019-04-02 PROBLEM — M54.41 RIGHT-SIDED LOW BACK PAIN WITH RIGHT-SIDED SCIATICA: Status: ACTIVE | Noted: 2019-04-02

## 2019-04-02 PROBLEM — M53.3 SACROILIAC JOINT PAIN: Status: ACTIVE | Noted: 2019-04-02

## 2019-04-02 ASSESSMENT — ACTIVITIES OF DAILY LIVING (ADL)
STEPPING_UP_AND_DOWN_CURBS: NO DIFFICULTY AT ALL
HOW_WOULD_YOU_RATE_YOUR_CURRENT_LEVEL_OF_FUNCTION_DURING_YOUR_USUAL_ACTIVITIES_OF_DAILY_LIVING_FROM_0_TO_100_WITH_100_BEING_YOUR_LEVEL_OF_FUNCTION_PRIOR_TO_YOUR_HIP_PROBLEM_AND_0_BEING_THE_INABILITY_TO_PERFORM_ANY_OF_YOUR_USUAL_DAILY_ACTIVITIES?: 95
GOING_UP_1_FLIGHT_OF_STAIRS: NO DIFFICULTY AT ALL
PUTTING_ON_SOCKS_AND_SHOES: NO DIFFICULTY AT ALL
DEEP_SQUATTING: MODERATE DIFFICULTY
ROLLING_OVER_IN_BED: MODERATE DIFFICULTY
HOS_ADL_SCORE(%): 92.19
LIGHT_TO_MODERATE_WORK: NO DIFFICULTY AT ALL
HOS_ADL_ITEM_SCORE_TOTAL: 59
WALKING_APPROXIMATELY_10_MINUTES: NO DIFFICULTY AT ALL
WALKING_15_MINUTES_OR_GREATER: NO DIFFICULTY AT ALL
STANDING_FOR_15_MINUTES: NO DIFFICULTY AT ALL
HOS_ADL_HIGHEST_POTENTIAL_SCORE: 64
WALKING_UP_STEEP_HILLS: NO DIFFICULTY AT ALL
WALKING_INITIALLY: SLIGHT DIFFICULTY
SITTING_FOR_15_MINUTES: NO DIFFICULTY AT ALL
HEAVY_WORK: NO DIFFICULTY AT ALL
GOING_DOWN_1_FLIGHT_OF_STAIRS: NO DIFFICULTY AT ALL
TWISTING/PIVOTING_ON_INVOLVED_LEG: NO DIFFICULTY AT ALL
HOS_ADL_COUNT: 16
GETTING_INTO_AND_OUT_OF_A_BATHTUB: NO DIFFICULTY AT ALL
GETTING_INTO_AND_OUT_OF_AN_AVERAGE_CAR: NO DIFFICULTY AT ALL
WALKING_DOWN_STEEP_HILLS: NO DIFFICULTY AT ALL

## 2019-04-03 DIAGNOSIS — N89.8 VAGINAL DRYNESS: ICD-10-CM

## 2019-04-04 RX ORDER — ESTRADIOL 10 UG/1
TABLET VAGINAL
Qty: 12 TABLET | Refills: 0 | Status: SHIPPED | OUTPATIENT
Start: 2019-04-04 | End: 2019-11-07

## 2019-04-13 ASSESSMENT — ENCOUNTER SYMPTOMS
NAIL CHANGES: 0
LOSS OF CONSCIOUSNESS: 0
ARTHRALGIAS: 0
NIGHT SWEATS: 0
HYPERTENSION: 0
POOR WOUND HEALING: 0
SLEEP DISTURBANCES DUE TO BREATHING: 0
HALLUCINATIONS: 0
NUMBNESS: 0
NECK MASS: 0
PALPITATIONS: 0
DIZZINESS: 0
DISTURBANCES IN COORDINATION: 0
DEPRESSION: 0
PANIC: 0
SEIZURES: 0
NECK PAIN: 0
STIFFNESS: 1
SYNCOPE: 0
DYSURIA: 0
BACK PAIN: 1
TROUBLE SWALLOWING: 0
INCREASED ENERGY: 0
LIGHT-HEADEDNESS: 0
EXERCISE INTOLERANCE: 0
WEIGHT GAIN: 0
TASTE DISTURBANCE: 0
NERVOUS/ANXIOUS: 0
SKIN CHANGES: 0
FLANK PAIN: 0
WEAKNESS: 0
PARALYSIS: 0
MEMORY LOSS: 0
MUSCLE CRAMPS: 0
MYALGIAS: 0
SMELL DISTURBANCE: 0
SORE THROAT: 0
DECREASED CONCENTRATION: 0
HYPOTENSION: 0
SINUS CONGESTION: 0
TINGLING: 0
HOARSE VOICE: 0
CHILLS: 0
POLYPHAGIA: 0
SINUS PAIN: 0
SPEECH CHANGE: 0
INSOMNIA: 1
FEVER: 0
WEIGHT LOSS: 0
DECREASED APPETITE: 0
HEMATURIA: 0
HEADACHES: 1
TREMORS: 0
JOINT SWELLING: 1
LEG PAIN: 0
ORTHOPNEA: 0
DIFFICULTY URINATING: 0
ALTERED TEMPERATURE REGULATION: 0
MUSCLE WEAKNESS: 0
POLYDIPSIA: 0
FATIGUE: 1

## 2019-04-17 ENCOUNTER — THERAPY VISIT (OUTPATIENT)
Dept: PHYSICAL THERAPY | Facility: CLINIC | Age: 64
End: 2019-04-17
Payer: COMMERCIAL

## 2019-04-17 DIAGNOSIS — M53.3 SACROILIAC JOINT PAIN: ICD-10-CM

## 2019-04-17 DIAGNOSIS — M54.41 RIGHT-SIDED LOW BACK PAIN WITH RIGHT-SIDED SCIATICA: ICD-10-CM

## 2019-04-17 PROCEDURE — 97530 THERAPEUTIC ACTIVITIES: CPT | Mod: GP | Performed by: PHYSICAL THERAPIST

## 2019-04-17 PROCEDURE — 97112 NEUROMUSCULAR REEDUCATION: CPT | Mod: GP | Performed by: PHYSICAL THERAPIST

## 2019-04-17 NOTE — PROGRESS NOTES
Pelvic Floor Muscle Assessment 4/17/2019    Baseline PF tone: WNL  PF Tone with cough: WNL  Valsalva: not tested  PF Response quality: normal  PF Power: Center: 4   Endurance: Maximum contraction in seconds: 10  # of endurance contractions before fatigue: NT  Quick contraction repetitions prior to fatigue: >10.  Specificity/accessory muscles: Good isolation

## 2019-04-18 ASSESSMENT — ENCOUNTER SYMPTOMS
DYSPNEA ON EXERTION: 0
HYPOTENSION: 0
WEIGHT GAIN: 0
HEADACHES: 1
TASTE DISTURBANCE: 0
SLEEP DISTURBANCES DUE TO BREATHING: 0
HOARSE VOICE: 0
DOUBLE VISION: 0
HEARTBURN: 0
BRUISES/BLEEDS EASILY: 0
POOR WOUND HEALING: 0
JAUNDICE: 0
FATIGUE: 1
SPUTUM PRODUCTION: 0
EXERCISE INTOLERANCE: 0
EYE PAIN: 0
INCREASED ENERGY: 0
DIZZINESS: 0
HEMATURIA: 0
PARALYSIS: 0
SORE THROAT: 0
DECREASED LIBIDO: 0
MUSCLE CRAMPS: 0
TREMORS: 0
SYNCOPE: 0
CONSTIPATION: 0
FEVER: 0
NAUSEA: 0
DECREASED APPETITE: 0
MUSCLE WEAKNESS: 0
RECTAL BLEEDING: 0
SPEECH CHANGE: 0
SINUS CONGESTION: 0
EYE IRRITATION: 0
NECK MASS: 0
HOT FLASHES: 0
SINUS PAIN: 0
BOWEL INCONTINENCE: 0
LIGHT-HEADEDNESS: 0
DIARRHEA: 0
COUGH: 0
SWOLLEN GLANDS: 0
CHILLS: 0
LOSS OF CONSCIOUSNESS: 0
SMELL DISTURBANCE: 0
MEMORY LOSS: 0
VOMITING: 0
LEG PAIN: 0
WHEEZING: 0
HYPERTENSION: 0
BACK PAIN: 1
HALLUCINATIONS: 0
TINGLING: 0
HEMOPTYSIS: 0
DISTURBANCES IN COORDINATION: 0
SHORTNESS OF BREATH: 0
NERVOUS/ANXIOUS: 0
BREAST PAIN: 0
POLYDIPSIA: 0
COUGH DISTURBING SLEEP: 0
ARTHRALGIAS: 0
EYE WATERING: 0
ORTHOPNEA: 0
PANIC: 0
POSTURAL DYSPNEA: 0
NIGHT SWEATS: 0
RESPIRATORY PAIN: 0
NUMBNESS: 0
SEIZURES: 0
BLOOD IN STOOL: 0
DEPRESSION: 0
EYE REDNESS: 0
DECREASED CONCENTRATION: 0
DIFFICULTY URINATING: 0
FLANK PAIN: 0
WEAKNESS: 0
ABDOMINAL PAIN: 0
POLYPHAGIA: 0
DYSURIA: 0
STIFFNESS: 1
BREAST MASS: 0
ALTERED TEMPERATURE REGULATION: 0
BLOATING: 0
RECTAL PAIN: 0
JOINT SWELLING: 1
SNORES LOUDLY: 0
NECK PAIN: 0
SKIN CHANGES: 0
WEIGHT LOSS: 0
NAIL CHANGES: 0
TROUBLE SWALLOWING: 0
PALPITATIONS: 0
INSOMNIA: 1
MYALGIAS: 0

## 2019-04-18 NOTE — PROGRESS NOTES
April 18, 2019    Referring Provider: Jennifer Ty PA-C  [address] [place] [address]    Primary Care Provider: Maritza Reyes    CC: urinary incontinence    HPI:    Ju Hernandez is a 64 year old female who presents for evaluation of her pelvic floor symptoms.  She is seen at the request of Jennifer Ty PA-C for a several year history of urinary incontinence under stress and at rest.     Ju reports urinary incontinence of drops of urine with sneezing and exercise. She wears an incontinence pad and changes it twice daily. If she does not wear a pad urine will leak through her underwear onto her clothes. She denies urinary urgency, urinary frequency, dysuria, hematuria, fecal incontinence, vaginal bulge, vaginal pressure, constipation, difficulty emptying bladder.     Prolapse:  Do you feel a vaginal bulge? No                                      Pressure? No  Do you have to place your fingers in the vagina or in the rectum to have a bowel movement? Yes, occasionally. She states that she does not have to do so but that it does help.  Impact to quality of life? None    Stress Incontinence:  Do you leak urine with cough, sneeze, exercise? Yes  How often do you leak with cough, sneeze, exercise?  Daily  How much do you usually leak? Drops  Do you wear a pad? Yes, changes 2x daily  Impact to quality of life? Minimal to moderate    Urge Incontinence:  Do you often get sudden urges to urinate? No    Voids/day:6-7  Nocturia: 1  Fluid intake: 6-7 8 oz glasses daily  Caffeine: 2 daily    Urinating:  Difficulty starting urination or strain to void? No  Weak or intermittent stream? No  Incomplete emptying or dribbling? No  Pain or burning with urination? No  Any blood in your urine? No    GI:  Constipation? No  Frequency stools: daily    Straining for stools: No  Stool consistency: Loose     Ever leak stool (Accidental Bowel Leakage)? No  History of irritable bowel or Crohn's?  No    Sexual/Pain:  Are you currently having sex? No,  unable    Prior therapy:  Ever done pelvic floor physical therapy? Yes  Trial of medication? No  Have you ever tried a pessary? No    Medical History:  Do you have?   High Cholesterol? No     Diabetes? No  High Blood pressure? No     Recurrent UTIs? Yes, history of >3 UTIs per year. No UTIs in the last 2 years. No history of complicated UTIs.  Sleep Apnea? No  Other medical problems: dry mouth, pain in right hip    Surgical History:    Hysterectomy? No   Bladder Surgery? No   Other? Right breast lumpectomy, Excision of abdominal lipoma    OB/Gyn History:  Pregnancies? 0  Current birth control? Post menopausal   Last mammogram? 7/2018  Last colonoscopy? 6/16/2016    Medications/Vitamins/Supplements:     Current Outpatient Medications:      CALCIUM PO, Take 2-4 tablets by mouth daily , Disp: , Rfl:      cholecalciferol (VITAMIN D) 1000 UNIT tablet, Take by mouth daily, Disp: 30 tablet, Rfl:      letrozole (FEMARA) 2.5 MG tablet, Take 1 tablet (2.5 mg) by mouth daily, Disp: 90 tablet, Rfl: 3     order for DME, Equipment being ordered: Compression bra, compression luis armando/tank for truncal edema after lumpectomy and radiation for breast CA., Disp: 1 each, Rfl: 0     vitamin  B complex with vitamin C (VITAMIN  B COMPLEX) TABS, Take 1 tablet by mouth daily, Disp: , Rfl:      YUVAFEM 10 MCG TABS vaginal tablet, INSERT 1 TABLET VAGINALLY THREE TIMES WEEKLY., Disp: 12 tablet, Rfl: 0     zolpidem (AMBIEN) 5 MG tablet, Take 1 tablet (5 mg) by mouth nightly as needed for sleep, Disp: 60 tablet, Rfl: 0    Drug Allergies:   Allergies   Allergen Reactions     Sulfa Drugs Hives     Latex Allergy: No  Iodine Allergy No    Family History:   Breast cancer? Yes   Ovarian cancer? No   Colon cancer? No  Other: Alzheimer's - mother, maternal grandmother    Social History:  Marital status:   Do you/ have you ever smoke(d)  cigarettes? Yes  Drink more than 1 alcoholic beverage  a day?  Yes  Occupation?     In the past 3 months have you regularly experienced:  Chest pain w/ walking/exercise? No                   Unusual headaches? Yes  Leg pain w/ walking/exercise? No                       Easy bruising? No  Difficulty breathing w/ walking/exercise? No  Problems with vision? No  Dizziness, falls, or fainting? No  Excessive bleeding from cuts, gums, surgery? No    Past Medical History:   Diagnosis Date     Breast cancer (H) 2015     Colon polyps      Hypovitaminosis D      Kidney stone      Multiple thyroid nodules     right dominant 1.3 cm; benign cytology 5/15     Osteoporosis 7/15    lowest T-score -3.2 33% radius     Pancreatic divisum      Recurrent UTI     since menopause (age 47) 3 x per year       Past Surgical History:   Procedure Laterality Date     EXCISION BENIGN LESION COMPL Right     Abdominal lipoma removed      EYE SURGERY      strabismus     LUMPECTOMY BREAST WITH SENTINEL NODE, COMBINED Right 2015    Procedure: COMBINED LUMPECTOMY BREAST WITH SENTINEL NODE;  Surgeon: Yoshi Oconnor MD;  Location:  OR       Social History     Socioeconomic History     Marital status:      Spouse name: Not on file     Number of children: Not on file     Years of education: Not on file     Highest education level: Not on file   Occupational History     Not on file   Social Needs     Financial resource strain: Not on file     Food insecurity:     Worry: Not on file     Inability: Not on file     Transportation needs:     Medical: Not on file     Non-medical: Not on file   Tobacco Use     Smoking status: Former Smoker     Last attempt to quit: 1986     Years since quittin.3     Smokeless tobacco: Never Used   Substance and Sexual Activity     Alcohol use: Yes     Comment: 2 glasses of wine daily     Drug use: No     Sexual activity: Not on file   Lifestyle     Physical activity:     Days per week: Not on file     Minutes per session: Not on file      Stress: Not on file   Relationships     Social connections:     Talks on phone: Not on file     Gets together: Not on file     Attends Alevism service: Not on file     Active member of club or organization: Not on file     Attends meetings of clubs or organizations: Not on file     Relationship status: Not on file     Intimate partner violence:     Fear of current or ex partner: Not on file     Emotionally abused: Not on file     Physically abused: Not on file     Forced sexual activity: Not on file   Other Topics Concern     Not on file   Social History Narrative     Not on file       Family History   Problem Relation Age of Onset     Breast Cancer Maternal Grandmother         late 50s     Breast Cancer Mother         late 60s     Thyroid Disease Mother         nodlue     Diabetes Maternal Grandfather      Meniere's disease Father      Thyroid Cancer No family hx of      Glaucoma No family hx of      Macular Degeneration No family hx of      Retinal detachment No family hx of        Review of Systems     Constitutional:  Positive for fatigue and recent stressors. Negative for fever, chills, weight loss, weight gain, decreased appetite, night sweats, height loss, post-operative complications, incisional pain, hallucinations, increased energy, hyperactivity and confused.   HENT:  Positive for tooth pain and dry mouth. Negative for ear pain, hearing loss, tinnitus, nosebleeds, trouble swallowing, hoarse voice, mouth sores, sore throat, ear discharge, gum tenderness, taste disturbance, smell disturbance, hearing aid, bleeding gums, sinus pain, sinus congestion and neck mass.    Eyes:  Negative for double vision, pain, redness, eye pain, decreased vision, eye watering, eye bulging, eye dryness, flashing lights, spots, floaters, strabismus, tunnel vision, jaundice and eye irritation.   Respiratory:   Negative for cough, hemoptysis, sputum production, shortness of breath, wheezing, sleep disturbances due to  breathing, snores loudly, respiratory pain, dyspnea on exertion, cough disturbing sleep and postural dyspnea.    Cardiovascular:  Positive for edema. Negative for chest pain, dyspnea on exertion, palpitations, orthopnea, fingers/toes turn blue, hypertension, hypotension, syncope, history of heart murmur, pacemaker, few scattered varicosities, leg pain, sleep disturbances due to breathing, light-headedness and exercise intolerance.   Gastrointestinal:  Negative for heartburn, nausea, vomiting, abdominal pain, diarrhea, constipation, blood in stool, melena, rectal pain, bloating, hemorrhoids, bowel incontinence, jaundice, rectal bleeding, coffee ground emesis and change in stool.   Genitourinary:  Positive for bladder incontinence. Negative for dysuria, urgency, hematuria, flank pain, vaginal discharge, difficulty urinating, genital sores, dyspareunia, decreased libido, nocturia, voiding less frequently, arousal difficulty, abnormal vaginal bleeding, excessive menstruation, menstrual changes, hot flashes, vaginal dryness and postmenopausal bleeding.   Musculoskeletal:  Positive for back pain, joint swelling and stiffness. Negative for myalgias, arthralgias, muscle cramps, neck pain, bone pain, muscle weakness and fracture.   Skin:  Positive for itching. Negative for nail changes, poor wound healing, rash, hair changes, skin changes, acne, warts, poor wound healing, scarring, flaky skin, Raynaud's phenomenon, sensitivity to sunlight and skin thickening.   Neurological:  Positive for headaches. Negative for dizziness, tingling, tremors, speech change, seizures, loss of consciousness, weakness, light-headedness, numbness, disturbances in coordination, memory loss, difficulty walking and paralysis.   Endo/Heme:  Negative for anemia, swollen glands and bruises/bleeds easily.   Psychiatric/Behavioral:  Negative for depression, hallucinations, memory loss, decreased concentration, mood swings and panic attacks.    Breast:   "Negative for breast discharge, breast mass, breast pain and nipple retraction.   Endocrine:  Negative for altered temperature regulation, polyphagia, polydipsia, unwanted hair growth and change in facial hair.      Allergies   Allergen Reactions     Sulfa Drugs Hives       Current Outpatient Medications   Medication     CALCIUM PO     cholecalciferol (VITAMIN D) 1000 UNIT tablet     letrozole (FEMARA) 2.5 MG tablet     order for DME     vitamin  B complex with vitamin C (VITAMIN  B COMPLEX) TABS     YUVAFEM 10 MCG TABS vaginal tablet     zolpidem (AMBIEN) 5 MG tablet     No current facility-administered medications for this visit.        /74   Pulse 89   Ht 1.651 m (5' 5\")   Wt 77.1 kg (170 lb)   BMI 28.29 kg/m   No LMP recorded. Patient is postmenopausal. Body mass index is 28.29 kg/m .  She is alert, comfortable in no acute distress, non-labored breathing.   Abdomen is soft, non-tender, non-distended, no CVAT.    Normal external female genitalia. The urethra was normal. There was a urethrocele to HR  She has good support on supine strain.  Speculum and bimanual exam are remarkable for normal vaginal mucosa, normal appearing cervix, urinary leakage with valsalva.        positive SST  VOID 300 ml  PVR 0 mL in and out cath    A/P: Namita Hernandez is a 64 year old F with stress urinary incontinence   - Referral for continued PFPT.   - Discussed additional treatment options including pessary and mid-urethral sling. Patient will return to clinic following pelvic floor physical therapy for further discussion of these treatment options if her symptoms have not resolved.     Scribed by Douglas Wilhelm, MS4 for Dr. Will Manrique.     A total of 40 minutes were spent with the patient today, > 50% in counseling and coordination of care    Will Manrique MD  Professor, OB/GYN  Urogynecologist    CC  Patient Care Team:  Maritza Reyes MD as PCP - General (Internal Medicine)  Maritza Reyes MD as " MD (Internal Medicine)  Seble Franklin, RN as Nurse Coordinator (Oncology)  Yoshi Oconnor MD as Referring Physician (General Surgery)  Manish Hill MD as MD (Family Practice)  Elmer Figueroa MD as MD (Student in organized health care education/training program)  Veronica Rogers MD as MD (INTERNAL MEDICINE - ENDOCRINOLOGY, DIABETES & METABOLISM)  Pb Raza MD as MD (Ophthalmology)  Will Manrique MD as MD (OB/Gyn)  SMITH TOPETE

## 2019-04-19 ENCOUNTER — OFFICE VISIT (OUTPATIENT)
Dept: UROLOGY | Facility: CLINIC | Age: 64
End: 2019-04-19
Attending: PHYSICIAN ASSISTANT
Payer: COMMERCIAL

## 2019-04-19 VITALS
HEART RATE: 89 BPM | SYSTOLIC BLOOD PRESSURE: 121 MMHG | BODY MASS INDEX: 28.32 KG/M2 | HEIGHT: 65 IN | WEIGHT: 170 LBS | DIASTOLIC BLOOD PRESSURE: 74 MMHG

## 2019-04-19 DIAGNOSIS — N81.0 URETHROCELE, FEMALE: ICD-10-CM

## 2019-04-19 DIAGNOSIS — N39.3 FEMALE STRESS INCONTINENCE: Primary | ICD-10-CM

## 2019-04-19 ASSESSMENT — MIFFLIN-ST. JEOR: SCORE: 1326.99

## 2019-04-19 ASSESSMENT — PAIN SCALES - GENERAL: PAINLEVEL: NO PAIN (0)

## 2019-04-19 NOTE — NURSING NOTE
"New-Urine Leaking/Incontinence     She reports the leaking started about 2 years ago and got worse over the last 6 Mo and uses one pad a day.       Chief Complaint   Patient presents with     New Patient     Incontinence        Blood pressure 121/74, pulse 89, height 1.651 m (5' 5\"), weight 77.1 kg (170 lb), not currently breastfeeding. Body mass index is 28.29 kg/m .    Patient Active Problem List   Diagnosis     Breast cancer, right breast (H)     Multiple thyroid nodules     Cervical adenopathy     Elbow pain, left     Right shoulder pain     Hypovitaminosis D     Senile osteoporosis     Parathyroid abnormality (H)     History of colonic polyps     Sacroiliac joint pain     Right-sided low back pain with right-sided sciatica       Allergies   Allergen Reactions     Sulfa Drugs Hives       Current Outpatient Medications   Medication Sig Dispense Refill     CALCIUM PO Take 2-4 tablets by mouth daily        cholecalciferol (VITAMIN D) 1000 UNIT tablet Take by mouth daily 30 tablet      letrozole (FEMARA) 2.5 MG tablet Take 1 tablet (2.5 mg) by mouth daily 90 tablet 3     order for DME Equipment being ordered: Compression bra, compression luis armando/tank for truncal edema after lumpectomy and radiation for breast CA. 1 each 0     vitamin  B complex with vitamin C (VITAMIN  B COMPLEX) TABS Take 1 tablet by mouth daily       YUVAFEM 10 MCG TABS vaginal tablet INSERT 1 TABLET VAGINALLY THREE TIMES WEEKLY. 12 tablet 0     zolpidem (AMBIEN) 5 MG tablet Take 1 tablet (5 mg) by mouth nightly as needed for sleep 60 tablet 0       Social History     Tobacco Use     Smoking status: Former Smoker     Last attempt to quit: 1986     Years since quittin.3     Smokeless tobacco: Never Used   Substance Use Topics     Alcohol use: Yes     Comment: 2 glasses of wine daily     Drug use: No       Kirill Ness, EMT  2019  2:22 PM      "

## 2019-04-19 NOTE — LETTER
RE: Namita Hernandez  [address] [place] [address]     Dear Colleague,    Thank you for referring your patient, Namita Hernandez, to the [facility] UROLOGY AND INST FOR PROSTATE AND UROLOGIC CANCERS at [facility]. Please see a copy of my visit note below.    April 18, 2019    Referring Provider: Jennifer Ty PA-C  [address] [place] [address]    Primary Care Provider: Maritza Reyes    CC: urinary incontinence    HPI:    Ju Hernandez is a 64 year old female who presents for evaluation of her pelvic floor symptoms.  She is seen at the request of Jennifer Ty PA-C for a several year history of urinary incontinence under stress and at rest.     Ju reports urinary incontinence of drops of urine with sneezing and exercise. She wears an incontinence pad and changes it twice daily. If she does not wear a pad urine will leak through her underwear onto her clothes. She denies urinary urgency, urinary frequency, dysuria, hematuria, fecal incontinence, vaginal bulge, vaginal pressure, constipation, difficulty emptying bladder.     Prolapse:  Do you feel a vaginal bulge? No                                      Pressure? No  Do you have to place your fingers in the vagina or in the rectum to have a bowel movement? Yes, occasionally. She states that she does not have to do so but that it does help.  Impact to quality of life? None    Stress Incontinence:  Do you leak urine with cough, sneeze, exercise? Yes  How often do you leak with cough, sneeze, exercise?  Daily  How much do you usually leak? Drops  Do you wear a pad? Yes, changes 2x daily  Impact to quality of life? Minimal to moderate    Urge Incontinence:  Do you often get sudden urges to urinate? No    Voids/day:6-7  Nocturia: 1  Fluid intake: 6-7 8 oz glasses daily  Caffeine: 2 daily    Urinating:  Difficulty starting urination or strain to void? No  Weak or intermittent stream? No  Incomplete emptying  or dribbling? No  Pain or burning with urination? No  Any blood in your urine? No    GI:  Constipation? No  Frequency stools: daily    Straining for stools: No  Stool consistency: Loose     Ever leak stool (Accidental Bowel Leakage)? No  History of irritable bowel or Crohn's? No    Sexual/Pain:  Are you currently having sex? No,  unable    Prior therapy:  Ever done pelvic floor physical therapy? Yes  Trial of medication? No  Have you ever tried a pessary? No    Medical History:  Do you have?   High Cholesterol? No     Diabetes? No  High Blood pressure? No     Recurrent UTIs? Yes, history of >3 UTIs per year. No UTIs in the last 2 years. No history of complicated UTIs.  Sleep Apnea? No  Other medical problems: dry mouth, pain in right hip    Surgical History:    Hysterectomy? No   Bladder Surgery? No   Other? Right breast lumpectomy, Excision of abdominal lipoma    OB/Gyn History:  Pregnancies? 0  Current birth control? Post menopausal   Last mammogram? 7/2018  Last colonoscopy? 6/16/2016    Medications/Vitamins/Supplements:     Current Outpatient Medications:      CALCIUM PO, Take 2-4 tablets by mouth daily , Disp: , Rfl:      cholecalciferol (VITAMIN D) 1000 UNIT tablet, Take by mouth daily, Disp: 30 tablet, Rfl:      letrozole (FEMARA) 2.5 MG tablet, Take 1 tablet (2.5 mg) by mouth daily, Disp: 90 tablet, Rfl: 3     order for DME, Equipment being ordered: Compression bra, compression luis armando/tank for truncal edema after lumpectomy and radiation for breast CA., Disp: 1 each, Rfl: 0     vitamin  B complex with vitamin C (VITAMIN  B COMPLEX) TABS, Take 1 tablet by mouth daily, Disp: , Rfl:      YUVAFEM 10 MCG TABS vaginal tablet, INSERT 1 TABLET VAGINALLY THREE TIMES WEEKLY., Disp: 12 tablet, Rfl: 0     zolpidem (AMBIEN) 5 MG tablet, Take 1 tablet (5 mg) by mouth nightly as needed for sleep, Disp: 60 tablet, Rfl: 0    Drug Allergies:   Allergies   Allergen Reactions     Sulfa Drugs Hives     Latex Allergy:  No  Iodine Allergy No    Family History:   Breast cancer? Yes   Ovarian cancer? No   Colon cancer? No  Other: Alzheimer's - mother, maternal grandmother    Social History:  Marital status:   Do you/ have you ever smoke(d)  cigarettes? Yes  Drink more than 1 alcoholic beverage a day?  Yes  Occupation?     In the past 3 months have you regularly experienced:  Chest pain w/ walking/exercise? No                   Unusual headaches? Yes  Leg pain w/ walking/exercise? No                       Easy bruising? No  Difficulty breathing w/ walking/exercise? No  Problems with vision? No  Dizziness, falls, or fainting? No  Excessive bleeding from cuts, gums, surgery? No    Past Medical History:   Diagnosis Date     Breast cancer (H) 4/2015     Colon polyps      Hypovitaminosis D      Kidney stone 2010     Multiple thyroid nodules 2015    right dominant 1.3 cm; benign cytology 5/15     Osteoporosis 7/15    lowest T-score -3.2 33% radius     Pancreatic divisum      Recurrent UTI     since menopause (age 47) 3 x per year       Past Surgical History:   Procedure Laterality Date     EXCISION BENIGN LESION COMPL Right 1997    Abdominal lipoma removed      EYE SURGERY  1960    strabismus     LUMPECTOMY BREAST WITH SENTINEL NODE, COMBINED Right 5/11/2015    Procedure: COMBINED LUMPECTOMY BREAST WITH SENTINEL NODE;  Surgeon: Yoshi Oconnor MD;  Location:  OR       Social History     Socioeconomic History     Marital status:      Spouse name: Not on file     Number of children: Not on file     Years of education: Not on file     Highest education level: Not on file   Occupational History     Not on file   Social Needs     Financial resource strain: Not on file     Food insecurity:     Worry: Not on file     Inability: Not on file     Transportation needs:     Medical: Not on file     Non-medical: Not on file   Tobacco Use     Smoking status: Former Smoker     Last attempt to quit: 1/1/1986     Years since quitting:  "33.3     Smokeless tobacco: Never Used   Substance and Sexual Activity     Alcohol use: Yes     Comment: 2 glasses of wine daily     Drug use: No     Sexual activity: Not on file   Lifestyle     Physical activity:     Days per week: Not on file     Minutes per session: Not on file     Stress: Not on file   Relationships     Social connections:     Talks on phone: Not on file     Gets together: Not on file     Attends Religion service: Not on file     Active member of club or organization: Not on file     Attends meetings of clubs or organizations: Not on file     Relationship status: Not on file     Intimate partner violence:     Fear of current or ex partner: Not on file     Emotionally abused: Not on file     Physically abused: Not on file     Forced sexual activity: Not on file   Other Topics Concern     Not on file   Social History Narrative     Not on file       Family History   Problem Relation Age of Onset     Breast Cancer Maternal Grandmother         late 50s     Breast Cancer Mother         late 60s     Thyroid Disease Mother         nodlue     Diabetes Maternal Grandfather      Meniere's disease Father      Thyroid Cancer No family hx of      Glaucoma No family hx of      Macular Degeneration No family hx of      Retinal detachment No family hx of      Allergies   Allergen Reactions     Sulfa Drugs Hives       Current Outpatient Medications   Medication     CALCIUM PO     cholecalciferol (VITAMIN D) 1000 UNIT tablet     letrozole (FEMARA) 2.5 MG tablet     order for DME     vitamin  B complex with vitamin C (VITAMIN  B COMPLEX) TABS     YUVAFEM 10 MCG TABS vaginal tablet     zolpidem (AMBIEN) 5 MG tablet     No current facility-administered medications for this visit.        /74   Pulse 89   Ht 1.651 m (5' 5\")   Wt 77.1 kg (170 lb)   BMI 28.29 kg/m    No LMP recorded. Patient is postmenopausal. Body mass index is 28.29 kg/m .  She is alert, comfortable in no acute distress, non-labored " breathing.   Abdomen is soft, non-tender, non-distended, no CVAT.    Normal external female genitalia. The urethra was normal. There was a urethrocele to HR  She has good support on supine strain.  Speculum and bimanual exam are remarkable for normal vaginal mucosa, normal appearing cervix, urinary leakage with valsalva.        positive SST  VOID 300 ml  PVR 0 mL in and out cath    A/P: Namita Hernandez is a 64 year old F with stress urinary incontinence   - Referral for continued PFPT.   - Discussed additional treatment options including pessary and mid-urethral sling. Patient will return to clinic following pelvic floor physical therapy for further discussion of these treatment options if her symptoms have not resolved.     Scribed by Douglas Wilhelm, MS4 for Dr. Will Manrique.     A total of 40 minutes were spent with the patient today, > 50% in counseling and coordination of care    Will Manrique MD  Professor, OB/GYN  Urogynecologist    CC  Patient Care Team:  Maritza Reyes MD as PCP - General (Internal Medicine)  Maritza Reyes MD as MD (Internal Medicine)  Seble Franklin, RN as Nurse Coordinator (Oncology)  Yoshi Oconnor MD as Referring Physician (General Surgery)  Manish Hill MD as MD (Family Practice)  Elmer Figueroa MD as MD (Student in organized health care education/training program)  Veronica Rogers MD as MD (INTERNAL MEDICINE - ENDOCRINOLOGY, DIABETES & METABOLISM)  Pb Raza MD as MD (Ophthalmology)  Will Manrique MD as MD (OB/Gyn)  SMITH TOPETE

## 2019-04-27 DIAGNOSIS — N89.8 VAGINAL DRYNESS: ICD-10-CM

## 2019-04-29 ENCOUNTER — THERAPY VISIT (OUTPATIENT)
Dept: PHYSICAL THERAPY | Facility: CLINIC | Age: 64
End: 2019-04-29
Payer: COMMERCIAL

## 2019-04-29 DIAGNOSIS — M53.3 SACROILIAC JOINT PAIN: ICD-10-CM

## 2019-04-29 DIAGNOSIS — M54.41 RIGHT-SIDED LOW BACK PAIN WITH RIGHT-SIDED SCIATICA: ICD-10-CM

## 2019-04-29 PROCEDURE — 97110 THERAPEUTIC EXERCISES: CPT | Mod: GP | Performed by: PHYSICAL THERAPIST

## 2019-04-29 PROCEDURE — 97112 NEUROMUSCULAR REEDUCATION: CPT | Mod: GP | Performed by: PHYSICAL THERAPIST

## 2019-04-29 PROCEDURE — 97530 THERAPEUTIC ACTIVITIES: CPT | Mod: GP | Performed by: PHYSICAL THERAPIST

## 2019-04-29 RX ORDER — ESTRADIOL 10 UG/1
TABLET VAGINAL
Qty: 12 TABLET | Refills: 1 | Status: SHIPPED | OUTPATIENT
Start: 2019-04-29 | End: 2019-07-29

## 2019-05-04 DIAGNOSIS — G47.09 OTHER INSOMNIA: ICD-10-CM

## 2019-05-06 RX ORDER — ZOLPIDEM TARTRATE 5 MG/1
TABLET ORAL
Qty: 60 TABLET
Start: 2019-05-06 | End: 2019-08-30

## 2019-05-09 ENCOUNTER — THERAPY VISIT (OUTPATIENT)
Dept: PHYSICAL THERAPY | Facility: CLINIC | Age: 64
End: 2019-05-09
Payer: COMMERCIAL

## 2019-05-09 DIAGNOSIS — M54.41 RIGHT-SIDED LOW BACK PAIN WITH RIGHT-SIDED SCIATICA: ICD-10-CM

## 2019-05-09 DIAGNOSIS — M53.3 SACROILIAC JOINT PAIN: ICD-10-CM

## 2019-05-09 PROCEDURE — 97140 MANUAL THERAPY 1/> REGIONS: CPT | Mod: GP | Performed by: PHYSICAL THERAPIST

## 2019-05-09 PROCEDURE — 97035 APP MDLTY 1+ULTRASOUND EA 15: CPT | Mod: GP | Performed by: PHYSICAL THERAPIST

## 2019-05-09 PROCEDURE — 97110 THERAPEUTIC EXERCISES: CPT | Mod: GP | Performed by: PHYSICAL THERAPIST

## 2019-05-20 ENCOUNTER — THERAPY VISIT (OUTPATIENT)
Dept: PHYSICAL THERAPY | Facility: CLINIC | Age: 64
End: 2019-05-20
Payer: COMMERCIAL

## 2019-05-20 DIAGNOSIS — M53.3 SACROILIAC JOINT PAIN: ICD-10-CM

## 2019-05-20 DIAGNOSIS — M54.41 RIGHT-SIDED LOW BACK PAIN WITH RIGHT-SIDED SCIATICA: ICD-10-CM

## 2019-05-20 PROCEDURE — 97112 NEUROMUSCULAR REEDUCATION: CPT | Mod: GP | Performed by: PHYSICAL THERAPIST

## 2019-05-20 PROCEDURE — 97110 THERAPEUTIC EXERCISES: CPT | Mod: GP | Performed by: PHYSICAL THERAPIST

## 2019-05-20 PROCEDURE — 97530 THERAPEUTIC ACTIVITIES: CPT | Mod: GP | Performed by: PHYSICAL THERAPIST

## 2019-05-31 ENCOUNTER — THERAPY VISIT (OUTPATIENT)
Dept: PHYSICAL THERAPY | Facility: CLINIC | Age: 64
End: 2019-05-31
Payer: COMMERCIAL

## 2019-05-31 DIAGNOSIS — M53.3 SACROILIAC JOINT PAIN: ICD-10-CM

## 2019-05-31 DIAGNOSIS — M54.41 RIGHT-SIDED LOW BACK PAIN WITH RIGHT-SIDED SCIATICA: ICD-10-CM

## 2019-05-31 PROCEDURE — 97112 NEUROMUSCULAR REEDUCATION: CPT | Mod: GP | Performed by: PHYSICAL THERAPIST

## 2019-05-31 PROCEDURE — 97110 THERAPEUTIC EXERCISES: CPT | Mod: GP | Performed by: PHYSICAL THERAPIST

## 2019-06-18 ENCOUNTER — THERAPY VISIT (OUTPATIENT)
Dept: PHYSICAL THERAPY | Facility: CLINIC | Age: 64
End: 2019-06-18
Payer: COMMERCIAL

## 2019-06-18 DIAGNOSIS — M54.41 RIGHT-SIDED LOW BACK PAIN WITH RIGHT-SIDED SCIATICA: ICD-10-CM

## 2019-06-18 DIAGNOSIS — M53.3 SACROILIAC JOINT PAIN: ICD-10-CM

## 2019-06-18 PROCEDURE — 97110 THERAPEUTIC EXERCISES: CPT | Mod: GP | Performed by: PHYSICAL THERAPIST

## 2019-06-24 ENCOUNTER — OFFICE VISIT (OUTPATIENT)
Dept: OPHTHALMOLOGY | Facility: CLINIC | Age: 64
End: 2019-06-24
Attending: OPHTHALMOLOGY
Payer: COMMERCIAL

## 2019-06-24 DIAGNOSIS — Z86.69 HISTORY OF STRABISMUS: ICD-10-CM

## 2019-06-24 DIAGNOSIS — H50.34 INTERMITTENT EXOTROPIA, ALTERNATING: Primary | ICD-10-CM

## 2019-06-24 DIAGNOSIS — Z79.899 LONG-TERM USE OF PLAQUENIL: ICD-10-CM

## 2019-06-24 PROCEDURE — G0463 HOSPITAL OUTPT CLINIC VISIT: HCPCS | Mod: ZF | Performed by: TECHNICIAN/TECHNOLOGIST

## 2019-06-24 PROCEDURE — 92134 CPTRZ OPH DX IMG PST SGM RTA: CPT | Mod: ZF | Performed by: STUDENT IN AN ORGANIZED HEALTH CARE EDUCATION/TRAINING PROGRAM

## 2019-06-24 ASSESSMENT — REFRACTION_WEARINGRX
SPECS_TYPE: PAL
OS_ADD: +2.25
OD_ADD: +2.25
OD_CYLINDER: +0.25
OS_SPHERE: -1.50
OD_SPHERE: -2.00
OD_AXIS: 003
OS_CYLINDER: SPHERE

## 2019-06-24 ASSESSMENT — VISUAL ACUITY
CORRECTION_TYPE: GLASSES
OD_CC: 20/20
METHOD: SNELLEN - LINEAR
OS_CC: 20/20

## 2019-06-24 ASSESSMENT — CONF VISUAL FIELD
OD_NORMAL: 1
METHOD: COUNTING FINGERS
OS_NORMAL: 1

## 2019-06-24 ASSESSMENT — TONOMETRY
IOP_METHOD: ICARE
OS_IOP_MMHG: 14
OD_IOP_MMHG: 16

## 2019-06-24 ASSESSMENT — SLIT LAMP EXAM - LIDS
COMMENTS: MGD
COMMENTS: MGD

## 2019-06-24 ASSESSMENT — CUP TO DISC RATIO
OS_RATIO: 0.4
OD_RATIO: 0.3

## 2019-06-24 NOTE — PROGRESS NOTES
"HPI:  Namita Hernandez is a 64 year old female with a history of breast cancer (4/2015), kidney stones, and thyroid nodules who presents for evaluation of vision changes. She reports that she has \"lack of clarity when driving\". She also notes that she has to adopt a head turn to watch TV late at night. Denies double vision. This issue is most noticeable when driving and when reading on tablet. She reports switched from Tamoxifen to Femara in the summer of 2018. Denies eye pain, flashes or floaters.     POH: strabismus repair ~1960 (5 years old)   PMH: thyroid nodules, breast cancer (s/p tamoxifen, now on Femara), kidney stones    SHx: former smoker    Ocular Meds: none      ASSESSMENT & PLAN:    Namita Hernandez is a 64 year old female with the following diagnoses:     # Intermittent Exotropia    - Reports feeling of eyes not tracking mostly when driving, though no diplopia and no VA change   - Exam with very mild X(T)    - History of strabismus repair as a child    - Will refer to orthoptist only visit to assess for possible need for prisms     # History of High risk Medication    - Discontinued tamoxifen (9/2015 - 6/2018)    - OCT macula with normal retinal layers (incidental IT trace IRF)               - no signs of keratopathy, retinopathy, or optic neuritis              - monitor      # History of Recurrent Erosions     - no signs of EBMD              - currently asymptomatic              - observe    # Choroidal Nevus              - along inferotemporal arcade, benign appearing               - monitor     # Cataracts, Both Eyes              - not visually significant              - observe    # Myopia with astigmatism with presbyopia    - good vision with current glasses     Patient disposition: Return for next available orthoptist, and 1 year v/t/d .     Staff: MD Kevin Villalba MD  Ophthalmology Resident, PGY-3  [facility]     Teaching statement:  Complete documentation of historical and " exam elements from today's encounter can be found in the full encounter summary report (not reduplicated in this progress note). I personally obtained the chief complaint(s) and history of present illness.  I confirmed and edited as necessary the review of systems, past medical/surgical history, family history, social history, and examination findings as documented by others; and I examined the patient myself. I personally reviewed the relevant tests, images, and reports as documented above.     I formulated and edited as necessary the assessment and plan and discussed the findings and management plan with the patient and family.    Tatum Villalba MD  Comprehensive Ophthalmology & Ocular Pathology  Department of Ophthalmology and Visual Neurosciences  [email or web address]  Pager [phone]

## 2019-06-27 ENCOUNTER — THERAPY VISIT (OUTPATIENT)
Dept: PHYSICAL THERAPY | Facility: CLINIC | Age: 64
End: 2019-06-27
Payer: COMMERCIAL

## 2019-06-27 DIAGNOSIS — M53.3 SACROILIAC JOINT PAIN: ICD-10-CM

## 2019-06-27 DIAGNOSIS — M54.41 RIGHT-SIDED LOW BACK PAIN WITH RIGHT-SIDED SCIATICA: ICD-10-CM

## 2019-06-27 PROCEDURE — 97530 THERAPEUTIC ACTIVITIES: CPT | Mod: GP | Performed by: PHYSICAL THERAPIST

## 2019-06-27 PROCEDURE — 97110 THERAPEUTIC EXERCISES: CPT | Mod: GP | Performed by: PHYSICAL THERAPIST

## 2019-06-30 NOTE — PROGRESS NOTES
[facility]  System  Physical Exam  General   ROS    PROGRESS  REPORT    Progress reporting period is from 4/1/2019 to 6/27/2019.       SUBJECTIVE  Subjective: Pt feeling very good. She has continued exercises and increased firmness on bed. 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 She is slowly getting back to exercise routine at gym as well as continuing HEP from PT.   Incontinence is still present but overall improving.      Initial Pain level: 3/10.   Changes in function:  Yes (See Goal flowsheet attached for changes in current functional level)  Adverse reaction to treatment or activity: None    OBJECTIVE  Changes noted in objective findings:  Yes, improved pain symptoms, strength, and function   Objective: Reviewed and modified HEP as appropriate. Discussed how to incorporate HEP into gym routine as well. Discussed safe progression of exercises.      ASSESSMENT/PLAN  Updated problem list and treatment plan: Diagnosis 1:  SI Joint Pain   Pain -  hot/cold therapy, manual therapy and home program  Decreased function - therapeutic activities and home program  STG/LTGs have been met or progress has been made towards goals:  Yes (See Goal flow sheet completed today.)  Assessment of Progress: The patient's condition is improving.  Self Management Plans:  Patient has been instructed in a home treatment program.  I have re-evaluated this patient and find that the nature, scope, duration and intensity of the therapy is appropriate for the medical condition of the patient.  Namita continues to require the following intervention to meet STG and LTG's:  PT    Recommendations:  This patient would benefit from continued therapy.     Frequency:  2 X a month, once daily  Duration:  for 1 months        Please refer to the daily flowsheet for treatment today, total treatment time and time spent performing 1:1 timed codes.

## 2019-07-02 DIAGNOSIS — Z85.3 PERSONAL HISTORY OF MALIGNANT NEOPLASM OF BREAST: ICD-10-CM

## 2019-07-02 RX ORDER — LETROZOLE 2.5 MG/1
TABLET, FILM COATED ORAL
Qty: 90 TABLET | Refills: 0 | Status: SHIPPED | OUTPATIENT
Start: 2019-07-02 | End: 2019-09-29

## 2019-07-08 ENCOUNTER — ANCILLARY PROCEDURE (OUTPATIENT)
Dept: BONE DENSITY | Facility: CLINIC | Age: 64
End: 2019-07-08
Attending: PHYSICIAN ASSISTANT
Payer: COMMERCIAL

## 2019-07-08 ENCOUNTER — ANCILLARY PROCEDURE (OUTPATIENT)
Dept: MAMMOGRAPHY | Facility: CLINIC | Age: 64
End: 2019-07-08
Payer: COMMERCIAL

## 2019-07-08 DIAGNOSIS — Z17.0 MALIGNANT NEOPLASM OF RIGHT BREAST IN FEMALE, ESTROGEN RECEPTOR POSITIVE, UNSPECIFIED SITE OF BREAST (H): ICD-10-CM

## 2019-07-08 DIAGNOSIS — C50.911 MALIGNANT NEOPLASM OF RIGHT BREAST IN FEMALE, ESTROGEN RECEPTOR POSITIVE, UNSPECIFIED SITE OF BREAST (H): ICD-10-CM

## 2019-07-08 DIAGNOSIS — Z51.81 ENCOUNTER FOR THERAPEUTIC DRUG MONITORING: ICD-10-CM

## 2019-07-08 DIAGNOSIS — Z85.3 PERSONAL HISTORY OF MALIGNANT NEOPLASM OF BREAST: ICD-10-CM

## 2019-07-29 ENCOUNTER — ONCOLOGY VISIT (OUTPATIENT)
Dept: ONCOLOGY | Facility: CLINIC | Age: 64
End: 2019-07-29
Attending: INTERNAL MEDICINE
Payer: COMMERCIAL

## 2019-07-29 VITALS
HEART RATE: 71 BPM | SYSTOLIC BLOOD PRESSURE: 112 MMHG | BODY MASS INDEX: 25.99 KG/M2 | RESPIRATION RATE: 16 BRPM | WEIGHT: 156 LBS | DIASTOLIC BLOOD PRESSURE: 75 MMHG | OXYGEN SATURATION: 98 % | HEIGHT: 65 IN | TEMPERATURE: 98.3 F

## 2019-07-29 DIAGNOSIS — M81.8 OTHER OSTEOPOROSIS, UNSPECIFIED PATHOLOGICAL FRACTURE PRESENCE: Primary | ICD-10-CM

## 2019-07-29 DIAGNOSIS — N89.8 VAGINAL DRYNESS: ICD-10-CM

## 2019-07-29 PROCEDURE — G0463 HOSPITAL OUTPT CLINIC VISIT: HCPCS | Mod: ZF

## 2019-07-29 PROCEDURE — 99214 OFFICE O/P EST MOD 30 MIN: CPT | Mod: GC | Performed by: INTERNAL MEDICINE

## 2019-07-29 RX ORDER — ALENDRONATE SODIUM 70 MG/1
70 TABLET ORAL
Qty: 12 TABLET | Refills: 3 | Status: SHIPPED | OUTPATIENT
Start: 2019-07-29 | End: 2020-06-19

## 2019-07-29 RX ORDER — ESTRADIOL 10 UG/1
10 INSERT VAGINAL
Qty: 12 TABLET | Refills: 1 | Status: SHIPPED | OUTPATIENT
Start: 2019-07-29 | End: 2019-11-07

## 2019-07-29 ASSESSMENT — MIFFLIN-ST. JEOR: SCORE: 1263.49

## 2019-07-29 ASSESSMENT — PAIN SCALES - GENERAL: PAINLEVEL: NO PAIN (0)

## 2019-07-29 NOTE — LETTER
7/29/2019       RE: Namita Hernandez  [address] [place] [address]     Dear Colleague,    Thank you for referring your patient, Namita Hernandez, to the [facility]. Please see a copy of my visit note below.    ONCOLOGY FOLLOW UP VISIT  July 29, 2019     Namita returns today for followup of a stage I, T1b N0, ER/NH-positive and HER2-negative breast cancer.      HISTORY OF PRESENT ILLNESS:   Namita is a 62-year-old woman who comes in today for followup for her breast cancer. Please see prior notes by Dr. Alegria for further detail (06/01/2015).  Briefly, Namita underwent a mammogram and ultrasound. Mammogram on 04/06/2015 suggested a 1.5 cm abnormal distortion confirmed by ultrasound.  By contrast mammography, this revealed an 18 mm area of abnormality at the 2 o'clock position involving her right breast.  Biopsy was recommended and confirmed an invasive ductal carcinoma, grade 1, ER/NH-positive and HER2-negative.  She underwent a lumpectomy and sentinel lymph node biopsy by Dr. Yoshi Oconnor.  Her final staging was a 6 mm tumor, T1b N0, ER/NH-positive and HER2-negative.  All margins were negative. She completed her RTx (52.4 Gy in 20 sessions 6/31-7/27). She started Tamoxifen in 09/2015. She switched to letrozole July 2018.       INTERVAL HISTORY:   She returns today in follow up and feels quite well. She  tolerated letrozole well so far. At first, she noticed some body aches in the arms, but it resolved quickly. Since that time, she has had no arthralgias or myalgias, no sites of pain anywhere. No hot flashes.   She is active with exercise (walking, pilates, strength training). She eats healthy diet.  She has weight loss goals in place. She continues to have issues with urinary incontinence x years despite pelvic floor exercises, occurs with stresses but also reports a constant drip. Needs to wear panty liners at all times. No other concerns.     Her 10-point review of systems is otherwise negative.        "  PHYSICAL EXAMINATION:  /75   Pulse 71   Temp 98.3  F (36.8  C) (Oral)   Resp 16   Ht 1.651 m (5' 5\")   Wt 70.8 kg (156 lb)   SpO2 98%   Breastfeeding? No   BMI 25.96 kg/m     GENERAL:  She is well-appearing, in no apparent distress.   HEENT:  Exam reveals no icterus.  Oropharynx is clear.  There are no ulcers or lesions.   NECK:  Supple.  No cervical, supraclavicular or axillary adenopathy.   HEART:  Regular.   LUNGS:  Clear bilaterally.   ABDOMEN:  Soft, nontender and nondistended.   BREASTS:  Well-healed surgical scar in her right breast with underlying scar tissue,  no mass or nodule palpated in either breast  SKIN: no rashes     No new labs today.    Mammogram on 7/8/19:   IMPRESSION: BI-RADS CATEGORY: 2 - Benign Finding(s).  RECOMMENDED FOLLOW-UP: Annual Mammography.      ASSESSMENT AND PLAN:    This 62-year-old woman with a T1b N0, ER/MI-positive and HER2-negative invasive ductal carcinoma, grade 1, involving the right breast, status post lumpectomy and right breast RTx. She is now on Tamoxifen for adjuvant endocrine therapy.     1.  Breast cancer.  [id number], stage IA, start tamoxifen Sept 2015, switched to letrozole July 2018 with plan to complete at least 5 years of endocrine therapy. She is tolerating the AI without any issues.   -Annual mammogram done in July 2019, which was normal as above.   - Continue follow-up every 6 months.      2. Osteoporosis in the radius bone, left spine and neck:  She has previously discussed bisphosphonates with Dr. Alegria when switching to AI.  Her DEXA scan on 7/8/19 showed stable disease with slight improvement. We went over different options of bisphosphonate with her in details, She agreed to start on  bisphosphonate medications after careful consideration, prefer oral. Medication order placed.   - Continue Ca++/vit D, strength exercises, as doing.     3. Diet and exercise. Reviewed goal for 150 minutes moderate intensity activity weekly. She is working on " her diet with weight loss goal in place.       Patient was seen, examed and discussed with Dr Alegria.       John Power MD  Hem/Onco Fellow    The patient was seen and evaluated by me with Dr. Michelle Power.  Overall, Veronica is doing very well with no concerning signs or symptoms.  She is tolerating her letrozole without difficulty.      PHYSICAL EXAMINATION:     GENERAL:  She is well appearing, in no apparent distress.   LYMPH:  She has no cervical, supraclavicular or axillary adenopathy.   HEART:  Regular.   LUNGS:  Clear bilaterally.   BREASTS:  She has a well-healed scar in the right breast.  No nodules or masses, no axillary adenopathy.      ASSESSMENT AND PLAN:  Ms. [facility] is doing well.  She has a history of T1bN0, ER-positive, MS-positive, HER-2 negative breast cancer involving the right breast, status post lumpectomy and radiation.  She began on endocrine therapy in 2015.  She is now on letrozole.  Our overall goal is to complete 5 years of endocrine therapy.  We will rediscuss prolonged therapy at that time.  However, given what early stage disease she had, I would be in favor of 5 years.      She has a history of osteoporosis.  We reviewed the pros and cons of using bisphosphonates.  Given her osteoporosis, she is interested in medication support for this, and we will go ahead and start oral Fosamax.  Side effects were discussed.  Consent obtained.      I reviewed with her a healthy lifestyle, such as exercise of at least 150 minutes per week, limiting processed foods, a diet high in vegetables as well as limiting alcohol use.     Delphine Alegria MD

## 2019-07-29 NOTE — NURSING NOTE
"Oncology Rooming Note    July 29, 2019 11:17 AM   Namita Hernandez is a 64 year old female who presents for:    Chief Complaint   Patient presents with     Oncology Clinic Visit     RETURN VISIT; BREAST CA; VITALS COMPLETED BY CMA      Initial Vitals: /75   Pulse 71   Temp 98.3  F (36.8  C) (Oral)   Resp 16   Ht 1.651 m (5' 5\")   Wt 70.8 kg (156 lb)   SpO2 98%   Breastfeeding? No   BMI 25.96 kg/m   Estimated body mass index is 25.96 kg/m  as calculated from the following:    Height as of this encounter: 1.651 m (5' 5\").    Weight as of this encounter: 70.8 kg (156 lb). Body surface area is 1.8 meters squared.  No Pain (0) Comment: Data Unavailable   No LMP recorded. Patient is postmenopausal.  Allergies reviewed: Yes  Medications reviewed: Yes    Medications: MEDICATION REFILLS NEEDED TODAY. Provider was notified. Patient needs a refill on Yuvafem.     Pharmacy name entered into [employer]: [id number] - [place], [place] - [address] [place] [address]    Clinical concerns: No new concerns today  Dr Alegria was notified.      Yasmeen Cordero              "

## 2019-07-29 NOTE — PROGRESS NOTES
"ONCOLOGY FOLLOW UP VISIT  July 29, 2019     Namita returns today for followup of a stage I, T1b N0, ER/GA-positive and HER2-negative breast cancer.      HISTORY OF PRESENT ILLNESS:   Namita is a 62-year-old woman who comes in today for followup for her breast cancer. Please see prior notes by Dr. Alegria for further detail (06/01/2015).  Briefly, Namita underwent a mammogram and ultrasound. Mammogram on 04/06/2015 suggested a 1.5 cm abnormal distortion confirmed by ultrasound.  By contrast mammography, this revealed an 18 mm area of abnormality at the 2 o'clock position involving her right breast.  Biopsy was recommended and confirmed an invasive ductal carcinoma, grade 1, ER/GA-positive and HER2-negative.  She underwent a lumpectomy and sentinel lymph node biopsy by Dr. Yoshi Oconnor.  Her final staging was a 6 mm tumor, T1b N0, ER/GA-positive and HER2-negative.  All margins were negative. She completed her RTx (52.4 Gy in 20 sessions 6/31-7/27). She started Tamoxifen in 09/2015. She switched to letrozole July 2018.       INTERVAL HISTORY:   She returns today in follow up and feels quite well. She tolerated letrozole well so far. At first, she noticed some body aches in the arms, but it resolved quickly. Since that time, she has had no arthralgias or myalgias, no sites of pain anywhere. No hot flashes.   She is active with exercise (walking, pilates, strength training). She eats healthy diet.  She has weight loss goals in place. She continues to have issues with urinary incontinence x years despite pelvic floor exercises, occurs with stresses but also reports a constant drip. Needs to wear panty liners at all times. No other concerns.     Her 10-point review of systems is otherwise negative.         PHYSICAL EXAMINATION:  /75   Pulse 71   Temp 98.3  F (36.8  C) (Oral)   Resp 16   Ht 1.651 m (5' 5\")   Wt 70.8 kg (156 lb)   SpO2 98%   Breastfeeding? No   BMI 25.96 kg/m    GENERAL:  She is well-appearing, in no " apparent distress.   HEENT:  Exam reveals no icterus.  Oropharynx is clear.  There are no ulcers or lesions.   NECK:  Supple.  No cervical, supraclavicular or axillary adenopathy.   HEART:  Regular.   LUNGS:  Clear bilaterally.   ABDOMEN:  Soft, nontender and nondistended.   BREASTS:  Well-healed surgical scar in her right breast with underlying scar tissue,  no mass or nodule palpated in either breast  SKIN: no rashes     No new labs today.    Mammogram on 7/8/19:   IMPRESSION: BI-RADS CATEGORY: 2 - Benign Finding(s).  RECOMMENDED FOLLOW-UP: Annual Mammography.      ASSESSMENT AND PLAN:    This 62-year-old woman with a T1b N0, ER/OH-positive and HER2-negative invasive ductal carcinoma, grade 1, involving the right breast, status post lumpectomy and right breast RTx. She is now on Tamoxifen for adjuvant endocrine therapy.     1.  Breast cancer.  [id number], stage IA, start tamoxifen Sept 2015, switched to letrozole July 2018 with plan to complete at least 5 years of endocrine therapy. She is tolerating the AI without any issues.   -Annual mammogram done in July 2019, which was normal as above.   - Continue follow-up every 6 months.      2. Osteoporosis in the radius bone, left spine and neck:  She has previously discussed bisphosphonates with Dr. Alegria when switching to AI. Her DEXA scan on 7/8/19 showed stable disease with slight improvement. We went over different options of bisphosphonate with her in details, She agreed to start on  bisphosphonate medications after careful consideration, prefer oral. Medication order placed.   - Continue Ca++/vit D, strength exercises, as doing.     3. Diet and exercise. Reviewed goal for 150 minutes moderate intensity activity weekly. She is working on her diet with weight loss goal in place.       Patient was seen, examed and discussed with Dr Alegria.       John Power MD  Hem/Onco Fellow    The patient was seen and evaluated by me with Dr. Michelle Power.  Overall, Veronica is doing very  well with no concerning signs or symptoms.  She is tolerating her letrozole without difficulty.      PHYSICAL EXAMINATION:     GENERAL:  She is well appearing, in no apparent distress.   LYMPH:  She has no cervical, supraclavicular or axillary adenopathy.   HEART:  Regular.   LUNGS:  Clear bilaterally.   BREASTS:  She has a well-healed scar in the right breast.  No nodules or masses, no axillary adenopathy.      ASSESSMENT AND PLAN:  Ms. Veronica Hernandez is doing well.  She has a history of T1bN0, ER-positive, MT-positive, HER-2 negative breast cancer involving the right breast, status post lumpectomy and radiation.  She began on endocrine therapy in 2015.  She is now on letrozole.  Our overall goal is to complete 5 years of endocrine therapy.  We will rediscuss prolonged therapy at that time.  However, given what early stage disease she had, I would be in favor of 5 years.      She has a history of osteoporosis.  We reviewed the pros and cons of using bisphosphonates.  Given her osteoporosis, she is interested in medication support for this, and we will go ahead and start oral Fosamax.  Side effects were discussed.  Consent obtained.      I reviewed with her a healthy lifestyle, such as exercise of at least 150 minutes per week, limiting processed foods, a diet high in vegetables as well as limiting alcohol use.     Delphine Alegria MD

## 2019-07-31 ENCOUNTER — OFFICE VISIT (OUTPATIENT)
Dept: DERMATOLOGY | Facility: CLINIC | Age: 64
End: 2019-07-31
Payer: COMMERCIAL

## 2019-07-31 DIAGNOSIS — D48.5 NEOPLASM OF UNCERTAIN BEHAVIOR OF SKIN: ICD-10-CM

## 2019-07-31 DIAGNOSIS — N89.8 VAGINAL DRYNESS: ICD-10-CM

## 2019-07-31 DIAGNOSIS — L82.1 SEBORRHEIC KERATOSIS: ICD-10-CM

## 2019-07-31 DIAGNOSIS — Z85.828 HISTORY OF BASAL CELL CARCINOMA: ICD-10-CM

## 2019-07-31 DIAGNOSIS — Z12.83 SKIN CANCER SCREENING: Primary | ICD-10-CM

## 2019-07-31 DIAGNOSIS — D22.9 MULTIPLE PIGMENTED NEVI: ICD-10-CM

## 2019-07-31 RX ORDER — ESTRADIOL 10 UG/1
TABLET VAGINAL
Qty: 12 TABLET | Refills: 1 | Status: SHIPPED | OUTPATIENT
Start: 2019-07-31 | End: 2019-09-26

## 2019-07-31 ASSESSMENT — PAIN SCALES - GENERAL
PAINLEVEL: NO PAIN (0)
PAINLEVEL: NO PAIN (0)

## 2019-07-31 NOTE — PROGRESS NOTES
[facility] Dermatology Note      Dermatology Problem List:  0. NUB, right posterior lower leg 7/31/19  1. Last total body skin exam: 7/31/19  2. ISK, s/p cryo  3. BCC, left posterior shoulder, s/p removal 7/16/2018     Encounter Date: Jul 31, 2019    CC:  Chief Complaint   Patient presents with     Skin Check     Skin check, Ju states she has a lesion of concern on her shoulder.          History of Present Illness:  Ms. Namita Hernandez is a 64 year old female, with a history of NMSC presents today for a yearly skin check. At her last skin check a basal cell carcinoma was biopsied from her left posterior shoulder on 7/16/19 which was subsequently removed by Dr. Kent and Dr. Upton on 8/23/19.    Today she reports that she has mole on her shoulder that feels rough and one lesion on her hip that she would like to have examined. Admits to significant sun exposure in her youth. She reports that her mother had moles removed fairly frequently. She notes that the front of her lower legs has a significant amount of sun spots and small moles. Denies swelling in the legs.    Otherwise the patient reports no additional painful, bleeding, nonhealing or pruritic lesions and denies any new or changing moles.    Past Medical History:   Patient Active Problem List   Diagnosis     Breast cancer, right breast (H)     Multiple thyroid nodules     Cervical adenopathy     Elbow pain, left     Right shoulder pain     Hypovitaminosis D     Senile osteoporosis     Parathyroid abnormality (H)     History of colonic polyps     Sacroiliac joint pain     Right-sided low back pain with right-sided sciatica     Past Medical History:   Diagnosis Date     Breast cancer (H) 4/2015     Colon polyps      Hypovitaminosis D      Kidney stone 2010     Multiple thyroid nodules 2015    right dominant 1.3 cm; benign cytology 5/15     Osteoporosis 7/15    lowest T-score -3.2 33% radius     Pancreatic divisum      Recurrent UTI     since  menopause (age 47) 3 x per year     Past Surgical History:   Procedure Laterality Date     EXCISION BENIGN LESION COMPL Right 1997    Abdominal lipoma removed      EYE SURGERY  1960    strabismus     LUMPECTOMY BREAST WITH SENTINEL NODE, COMBINED Right 5/11/2015    Procedure: COMBINED LUMPECTOMY BREAST WITH SENTINEL NODE;  Surgeon: Yoshi Oconnor MD;  Location:  OR       Social History:  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 The patient denies use of tanning beds. However, she admits to significant sun exposure in her younger years.    Family History:  There is no family history of skin cancer, eczema or psoriasis .    Medications:  Current Outpatient Medications   Medication Sig Dispense Refill     alendronate (FOSAMAX) 70 MG tablet Take 1 tablet (70 mg) by mouth every 7 days 12 tablet 3     CALCIUM PO Take 2-4 tablets by mouth daily        cholecalciferol (VITAMIN D) 1000 UNIT tablet Take by mouth daily 30 tablet      estradiol (YUVAFEM) 10 MCG TABS vaginal tablet Place 1 tablet (10 mcg) vaginally twice a week 12 tablet 1     letrozole (FEMARA) 2.5 MG tablet TAKE 1 TABLET BY MOUTH EVERY DAY 90 tablet 0     order for DME Equipment being ordered: Compression bra, compression luis armando/tank for truncal edema after lumpectomy and radiation for breast CA. 1 each 0     vitamin  B complex with vitamin C (VITAMIN  B COMPLEX) TABS Take 1 tablet by mouth daily       YUVAFEM 10 MCG TABS vaginal tablet INSERT 1 TABLET VAGINALLY THREE TIMES WEEKLY. 12 tablet 0     zolpidem (AMBIEN) 5 MG tablet TAKE 1 TABLET BY MOUTH NIGHTLY AS NEEDED FOR SLEEP 60 tablet        Allergies   Allergen Reactions     Sulfa Drugs Hives       Review of Systems:  -Skin/Heme New Pt: The patient admits to frequent sun exposure. The patient denies excessive scarring or problems healing except as per HPI. The patient denies excessive bleeding.  -Constitutional: The patient is feeling generally well   -Skin:  As above in HPI. No additional skin concerns.    Physical exam:  Vitals: There were no vitals taken for this visit.  GEN: This is a well developed, well-nourished female in no acute distress, in a pleasant mood.    SKIN: Total skin excluding the undergarment areas was performed. The exam included the head/face, neck, both arms, chest, back, abdomen, both legs, digits and/or nails.   - 6mm brown asymmetric macule with irregular borders, right posterior lower leg  -Multiple regular brown pigmented macules and papules are identified on the trunk and extremities.   - There are tan to brown stuck on papules scattered to the back.  -There are dome shaped bright red papules on the trunk  -No other lesions of concern on areas examined.     Impression/Plan:  1. Neoplasm of uncertain behavior, right posterior lower leg, DDx congenital nevus vs dysplastic nevus vs mm vs other    After discussion of benefits and risks including but not limited to bleeding, infection, scar, incomplete removal, recurrence, and non-diagnostic biopsy, written consent and photographs were obtained. The area was cleaned with isopropyl alcohol. 1.0 mL of 1% lidocaine with epinephrine was injected to obtain adequate anesthesia of the lesion on the right posterior leg. A shave biopsy was performed. Hemostasis was achieved with aluminium chloride. Vaseline and a sterile dressing were applied. The patient tolerated the procedure and no complications were noted. The patient was provided with verbal and written post care instructions.     2. Multiple clinically benign nevi on the trunk and extremities     ABCDs of melanoma were discussed and self skin checks were advised.    Sun precaution was advised including the use of sun screens of SPF 30 or higher, sun protective clothing, and avoidance of tanning beds.    3. Seborrheic keratosis, asymptomatic    Benign nature reassured, no further intervention required    4. Cherry angioma(s)    Benign nature  reassured, no further intervention required      Follow-up in 1 year, earlier for new or changing lesions.       Staff Involved:  Scribe/Staff    Scribe Disclosure:   I, Albert Ness, am serving as a scribe to document services personally performed by Angie Arredondo PA-C, based on data collection and the provider's statements to me.    Provider Disclosure:   The documentation recorded by the scribe accurately reflects the services I personally performed and the decisions made by me.    All risks, benefits and alternatives were discussed with patient.  Patient is in agreement and understands the assessment and plan.  All questions were answered.  Sun Screen Education was given.   Return to Clinic annually or sooner as needed.   Angie Arredondo PA-C   [facility]

## 2019-07-31 NOTE — PATIENT INSTRUCTIONS

## 2019-07-31 NOTE — NURSING NOTE
Lidocaine-epinephrine 1-1:357052 % injection   1mL once for one use, starting 7/31/2019 ending 7/31/2019,  2mL disp, R-0, injection  Injected by Maritza Brown LPN

## 2019-07-31 NOTE — LETTER
7/31/2019       RE: Namita Hernandez  [address] [place] [address]     Dear Colleague,    Thank you for referring your patient, Namita Hernandez, to the [facility] DERMATOLOG[facility]. Please see a copy of my visit note below.    [facility] Dermatology Note      Dermatology Problem List:  0. NUB, right posterior lower leg 7/31/19  1. Last total body skin exam: 7/31/19  2. ISK, s/p cryo  3. BCC, left posterior shoulder, s/p removal 7/16/2018     Encounter Date: Jul 31, 2019    CC:  Chief Complaint   Patient presents with     Skin Check     Skin check, Ju states she has a lesion of concern on her shoulder.          History of Present Illness:  Ms. Namita Hernandez is a 64 year old female, with a history of NMSC presents today for a yearly skin check. At her last skin check a basal cell carcinoma was biopsied from her left posterior shoulder on 7/16/19 which was subsequently removed by Dr. Kent and Dr. Upton on 8/23/19.    Today she reports that she has mole on her shoulder that feels rough and one lesion on her hip that she would like to have examined. Admits to significant sun exposure in her youth. She reports that her mother had moles removed fairly frequently. She notes that the front of her lower legs has a significant amount of sun spots and small moles. Denies swelling in the legs.    Otherwise the patient reports no additional painful, bleeding, nonhealing or pruritic lesions and denies any new or changing moles.    Past Medical History:   Patient Active Problem List   Diagnosis     Breast cancer, right breast (H)     Multiple thyroid nodules     Cervical adenopathy     Elbow pain, left     Right shoulder pain     Hypovitaminosis D     Senile osteoporosis     Parathyroid abnormality (H)     History of colonic polyps     Sacroiliac joint pain     Right-sided low back pain with right-sided sciatica     Past Medical History:   Diagnosis Date     Breast  cancer (H) 4/2015     Colon polyps      Hypovitaminosis D      Kidney stone 2010     Multiple thyroid nodules 2015    right dominant 1.3 cm; benign cytology 5/15     Osteoporosis 7/15    lowest T-score -3.2 33% radius     Pancreatic divisum      Recurrent UTI     since menopause (age 47) 3 x per year     Past Surgical History:   Procedure Laterality Date     EXCISION BENIGN LESION COMPL Right 1997    Abdominal lipoma removed      EYE SURGERY  1960    strabismus     LUMPECTOMY BREAST WITH SENTINEL NODE, COMBINED Right 5/11/2015    Procedure: COMBINED LUMPECTOMY BREAST WITH SENTINEL NODE;  Surgeon: Yoshi Oconnor MD;  Location:  OR       Social History:  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 The patient denies use of tanning beds. However, she admits to significant sun exposure in her younger years.    Family History:  There is no family history of skin cancer, eczema or psoriasis .    Medications:  Current Outpatient Medications   Medication Sig Dispense Refill     alendronate (FOSAMAX) 70 MG tablet Take 1 tablet (70 mg) by mouth every 7 days 12 tablet 3     CALCIUM PO Take 2-4 tablets by mouth daily        cholecalciferol (VITAMIN D) 1000 UNIT tablet Take by mouth daily 30 tablet      estradiol (YUVAFEM) 10 MCG TABS vaginal tablet Place 1 tablet (10 mcg) vaginally twice a week 12 tablet 1     letrozole (FEMARA) 2.5 MG tablet TAKE 1 TABLET BY MOUTH EVERY DAY 90 tablet 0     order for DME Equipment being ordered: Compression bra, compression luis armando/tank for truncal edema after lumpectomy and radiation for breast CA. 1 each 0     vitamin  B complex with vitamin C (VITAMIN  B COMPLEX) TABS Take 1 tablet by mouth daily       YUVAFEM 10 MCG TABS vaginal tablet INSERT 1 TABLET VAGINALLY THREE TIMES WEEKLY. 12 tablet 0     zolpidem (AMBIEN) 5 MG tablet TAKE 1 TABLET BY MOUTH NIGHTLY AS NEEDED FOR SLEEP 60 tablet        Allergies   Allergen Reactions     Sulfa Drugs Hives        Review of Systems:  -Skin/Heme New Pt: The patient admits to frequent sun exposure. The patient denies excessive scarring or problems healing except as per HPI. The patient denies excessive bleeding.  -Constitutional: The patient is feeling generally well   -Skin: As above in HPI. No additional skin concerns.    Physical exam:  Vitals: There were no vitals taken for this visit.  GEN: This is a well developed, well-nourished female in no acute distress, in a pleasant mood.    SKIN: Total skin excluding the undergarment areas was performed. The exam included the head/face, neck, both arms, chest, back, abdomen, both legs, digits and/or nails.   - 6mm brown asymmetric macule with irregular borders, right posterior lower leg  -Multiple regular brown pigmented macules and papules are identified on the trunk and extremities.   - There are tan to brown stuck on papules scattered to the back.  -There are dome shaped bright red papules on the trunk  -No other lesions of concern on areas examined.     Impression/Plan:  1. Neoplasm of uncertain behavior, right posterior lower leg, DDx congenital nevus vs dysplastic nevus vs mm vs other    After discussion of benefits and risks including but not limited to bleeding, infection, scar, incomplete removal, recurrence, and non-diagnostic biopsy, written consent and photographs were obtained. The area was cleaned with isopropyl alcohol. 1.0 mL of 1% lidocaine with epinephrine was injected to obtain adequate anesthesia of the lesion on the right posterior leg. A shave biopsy was performed. Hemostasis was achieved with aluminium chloride. Vaseline and a sterile dressing were applied. The patient tolerated the procedure and no complications were noted. The patient was provided with verbal and written post care instructions.     2. Multiple clinically benign nevi on the trunk and extremities     ABCDs of melanoma were discussed and self skin checks were advised.    Sun precaution  was advised including the use of sun screens of SPF 30 or higher, sun protective clothing, and avoidance of tanning beds.    3. Seborrheic keratosis, asymptomatic    Benign nature reassured, no further intervention required    4. Cherry angioma(s)    Benign nature reassured, no further intervention required      Follow-up in 1 year, earlier for new or changing lesions.       Staff Involved:  Scribe/Staff    Scribe Disclosure:   I, Albert Ness, am serving as a scribe to document services personally performed by Angie Arredondo PA-C, based on data collection and the provider's statements to me.    Provider Disclosure:   The documentation recorded by the scribe accurately reflects the services I personally performed and the decisions made by me.    All risks, benefits and alternatives were discussed with patient.  Patient is in agreement and understands the assessment and plan.  All questions were answered.  Sun Screen Education was given.   Return to Clinic annually or sooner as needed.   Angie Arredondo PA-C   [facility]

## 2019-07-31 NOTE — NURSING NOTE
Dermatology Rooming Note    Namita Hernandez's goals for this visit include:   Chief Complaint   Patient presents with     Skin Check     Skin check, Ju states she has a lesion of concern on her shoulder.      Maritza Brown LPN

## 2019-08-02 LAB — COPATH REPORT: NORMAL

## 2019-08-13 DIAGNOSIS — Z98.890 S/P SKIN BIOPSY: Primary | ICD-10-CM

## 2019-08-13 RX ORDER — MUPIROCIN 20 MG/G
OINTMENT TOPICAL
Qty: 30 G | Refills: 0 | Status: SHIPPED | OUTPATIENT
Start: 2019-08-13 | End: 2019-11-07

## 2019-08-30 ENCOUNTER — MYC REFILL (OUTPATIENT)
Dept: ONCOLOGY | Facility: CLINIC | Age: 64
End: 2019-08-30

## 2019-08-30 DIAGNOSIS — G47.09 OTHER INSOMNIA: ICD-10-CM

## 2019-08-30 RX ORDER — ZOLPIDEM TARTRATE 5 MG/1
5 TABLET ORAL
Qty: 60 TABLET | Refills: 0 | Status: SHIPPED | OUTPATIENT
Start: 2019-08-30 | End: 2019-11-27

## 2019-09-26 DIAGNOSIS — N89.8 VAGINAL DRYNESS: ICD-10-CM

## 2019-09-29 DIAGNOSIS — Z85.3 PERSONAL HISTORY OF MALIGNANT NEOPLASM OF BREAST: ICD-10-CM

## 2019-09-30 RX ORDER — ESTRADIOL 10 UG/1
TABLET VAGINAL
Qty: 12 TABLET | Refills: 1 | Status: SHIPPED | OUTPATIENT
Start: 2019-09-30 | End: 2019-12-31

## 2019-09-30 RX ORDER — LETROZOLE 2.5 MG/1
TABLET, FILM COATED ORAL
Qty: 90 TABLET | Refills: 3 | Status: SHIPPED | OUTPATIENT
Start: 2019-09-30 | End: 2021-02-24

## 2019-10-04 ENCOUNTER — HEALTH MAINTENANCE LETTER (OUTPATIENT)
Age: 64
End: 2019-10-04

## 2019-10-21 ENCOUNTER — OFFICE VISIT (OUTPATIENT)
Dept: URGENT CARE | Facility: URGENT CARE | Age: 64
End: 2019-10-21
Payer: COMMERCIAL

## 2019-10-21 VITALS
OXYGEN SATURATION: 96 % | TEMPERATURE: 98.8 F | SYSTOLIC BLOOD PRESSURE: 139 MMHG | HEART RATE: 73 BPM | WEIGHT: 161 LBS | BODY MASS INDEX: 26.79 KG/M2 | DIASTOLIC BLOOD PRESSURE: 80 MMHG

## 2019-10-21 DIAGNOSIS — N76.0 BV (BACTERIAL VAGINOSIS): Primary | ICD-10-CM

## 2019-10-21 DIAGNOSIS — B96.89 BV (BACTERIAL VAGINOSIS): Primary | ICD-10-CM

## 2019-10-21 DIAGNOSIS — R30.0 DYSURIA: ICD-10-CM

## 2019-10-21 LAB
ALBUMIN UR-MCNC: NEGATIVE MG/DL
APPEARANCE UR: CLEAR
BACTERIA #/AREA URNS HPF: ABNORMAL /HPF
BILIRUB UR QL STRIP: NEGATIVE
COLOR UR AUTO: YELLOW
GLUCOSE UR STRIP-MCNC: NEGATIVE MG/DL
HGB UR QL STRIP: NEGATIVE
KETONES UR STRIP-MCNC: NEGATIVE MG/DL
LEUKOCYTE ESTERASE UR QL STRIP: ABNORMAL
NITRATE UR QL: NEGATIVE
NON-SQ EPI CELLS #/AREA URNS LPF: ABNORMAL /LPF
PH UR STRIP: 7 PH (ref 5–7)
RBC #/AREA URNS AUTO: ABNORMAL /HPF
SOURCE: ABNORMAL
SP GR UR STRIP: 1.01 (ref 1–1.03)
SPECIMEN SOURCE: ABNORMAL
UROBILINOGEN UR STRIP-ACNC: 0.2 EU/DL (ref 0.2–1)
WBC #/AREA URNS AUTO: ABNORMAL /HPF
WET PREP SPEC: ABNORMAL

## 2019-10-21 PROCEDURE — 99213 OFFICE O/P EST LOW 20 MIN: CPT | Performed by: NURSE PRACTITIONER

## 2019-10-21 PROCEDURE — 81001 URINALYSIS AUTO W/SCOPE: CPT | Performed by: NURSE PRACTITIONER

## 2019-10-21 PROCEDURE — 87210 SMEAR WET MOUNT SALINE/INK: CPT | Performed by: NURSE PRACTITIONER

## 2019-10-21 RX ORDER — METRONIDAZOLE 500 MG/1
500 TABLET ORAL 2 TIMES DAILY
Qty: 14 TABLET | Refills: 0 | Status: SHIPPED | OUTPATIENT
Start: 2019-10-21 | End: 2019-11-07

## 2019-10-21 ASSESSMENT — ENCOUNTER SYMPTOMS
HEMATURIA: 0
FLANK PAIN: 0
FATIGUE: 1
NAUSEA: 0
DYSURIA: 1
ABDOMINAL PAIN: 0
VOMITING: 0
FREQUENCY: 1

## 2019-10-21 NOTE — PROGRESS NOTES
metroniSUBJECTIVE:   Namita Hernandez is a 64 year old female presenting with a chief complaint of   Chief Complaint   Patient presents with     Urgent Care     Pt in clinic to have eval for dysuria.     Dysuria       She is an established patient of [facility].    UTI    Onset of symptoms was 1week(s).  Course of illness is worsening  Severity moderate  Current and associated symptoms dysuria, frequency and urgency  Treatment and measures tried Antibiotics  Predisposing factors include history of frequent UTI's  Patient denies rigors, flank pain, temperature > 101 degrees F., vomiting, vaginal discharge, vaginal odor and vaginal itching    Did take medication she previously had did start macrobid        Review of Systems   Constitutional: Positive for fatigue.   Gastrointestinal: Negative for abdominal pain, nausea and vomiting.   Genitourinary: Positive for dysuria, frequency and urgency. Negative for flank pain, hematuria, vaginal bleeding and vaginal discharge.       Past Medical History:   Diagnosis Date     Breast cancer (H) 4/2015     Colon polyps      Hypovitaminosis D      Kidney stone 2010     Multiple thyroid nodules 2015    right dominant 1.3 cm; benign cytology 5/15     Osteoporosis 7/15    lowest T-score -3.2 33% radius     Pancreatic divisum      Recurrent UTI     since menopause (age 47) 3 x per year     Family History   Problem Relation Age of Onset     Breast Cancer Maternal Grandmother         late 50s     Breast Cancer Mother         late 60s     Thyroid Disease Mother         nodlue     Diabetes Maternal Grandfather      Meniere's disease Father      Thyroid Cancer No family hx of      Glaucoma No family hx of      Macular Degeneration No family hx of      Retinal detachment No family hx of      Melanoma No family hx of      Current Outpatient Medications   Medication Sig Dispense Refill     alendronate (FOSAMAX) 70 MG tablet Take 1 tablet (70 mg) by mouth every 7 days 12 tablet 3     CALCIUM PO Take  2-4 tablets by mouth daily        cholecalciferol (VITAMIN D) 1000 UNIT tablet Take by mouth daily 30 tablet      estradiol (YUVAFEM) 10 MCG TABS vaginal tablet Place 1 tablet (10 mcg) vaginally twice a week 12 tablet 1     letrozole (FEMARA) 2.5 MG tablet TAKE 1 TABLET BY MOUTH EVERY DAY 90 tablet 3     vitamin  B complex with vitamin C (VITAMIN  B COMPLEX) TABS Take 1 tablet by mouth daily       YUVAFEM 10 MCG TABS vaginal tablet INSERT 1 TABLET VAGINALLY THREE TIMES WEEKLY. 12 tablet 1     YUVAFEM 10 MCG TABS vaginal tablet INSERT 1 TABLET VAGINALLY THREE TIMES WEEKLY. 12 tablet 0     zolpidem (AMBIEN) 5 MG tablet Take 1 tablet (5 mg) by mouth nightly as needed 60 tablet 0     mupirocin (BACTROBAN) 2 % external ointment Use 2 times a day to affected area. (Patient not taking: Reported on 10/21/2019) 30 g 0     order for DME Equipment being ordered: Compression bra, compression luis armando/tank for truncal edema after lumpectomy and radiation for breast CA. (Patient not taking: Reported on 10/21/2019) 1 each 0     Social History     Tobacco Use     Smoking status: Former Smoker     Last attempt to quit: 1986     Years since quittin.8     Smokeless tobacco: Never Used   Substance Use Topics     Alcohol use: Yes     Comment: 2 glasses of wine daily       OBJECTIVE  /80   Pulse 73   Temp 98.8  F (37.1  C) (Oral)   Wt 73 kg (161 lb)   SpO2 96%   BMI 26.79 kg/m      Physical Exam    Labs:  Results for orders placed or performed in visit on 10/21/19 (from the past 24 hour(s))   *UA reflex to Microscopic and Culture ([place] and [facility] (except [place] and [place])   Result Value Ref Range    Color Urine Yellow     Appearance Urine Clear     Glucose Urine Negative NEG^Negative mg/dL    Bilirubin Urine Negative NEG^Negative    Ketones Urine Negative NEG^Negative mg/dL    Specific Gravity Urine 1.015 1.003 - 1.035    Blood Urine Negative NEG^Negative    pH Urine 7.0 5.0 - 7.0 pH    Protein Albumin  Urine Negative NEG^Negative mg/dL    Urobilinogen Urine 0.2 0.2 - 1.0 EU/dL    Nitrite Urine Negative NEG^Negative    Leukocyte Esterase Urine Trace (A) NEG^Negative    Source Midstream Urine    Urine Microscopic   Result Value Ref Range    WBC Urine 0 - 5 OTO5^0 - 5 /HPF    RBC Urine O - 2 OTO2^O - 2 /HPF    Squamous Epithelial /LPF Urine Few FEW^Few /LPF    Bacteria Urine Few (A) NEG^Negative /HPF   Wet prep   Result Value Ref Range    Specimen Description Vagina     Wet Prep No Trichomonas seen     Wet Prep Moderate  Clue cells seen   (A)     Wet Prep No yeast seen     Wet Prep Few  WBC'S seen          X-Ray was not done.    ASSESSMENT:      ICD-10-CM    1. BV (bacterial vaginosis) N76.0 metroNIDAZOLE (FLAGYL) 500 MG tablet    B96.89    2. Dysuria R30.0 *UA reflex to Microscopic and Culture ([place] and [facility] (except [place] and [place])     Urine Microscopic     Wet prep        Medical Decision Making:    Differential Diagnosis:  UTI: UTI, Dysuria, Pyelonephritis, Urethritis and Vaginitis    Serious Comorbid Conditions:  Adult:  None    PLAN:  Discussed with patient that UA did not show an infection. Wet prep was positive for BV. Will start her on metronidazole twice a day for 7 days. Educated that she could have had a UTI that was treated with the nitrofurantion but symptoms could not have resolved because of the BV aspect. Additional symptomatic treatment recommendations discussed. Education was added to AVS. Patient was agreeable to plan and verbalized understanding.       Followup:    If not improving in 3-5 days  or if condition worsens, follow up with your Primary Care Provider    Patient Instructions   Metronidazole twice a day for 7 days  Push fluids  Plenty of rest  Tylenol and ibuprofen for pain      Patient Education     Bacterial Vaginosis    You have a vaginal infection called bacterial vaginosis (BV). Both good and bad bacteria are present in a healthy vagina. BV occurs when these  bacteria get out of balance. The number of bad bacteria increase. And the number of good bacteria decrease. Although BV is associated with sexual activity, it is not a sexually transmitted disease.  BV may or may not cause symptoms. If symptoms do occur, they can include:    Thin, gray, milky-white, or sometimes green discharge    Unpleasant odor or  fishy  smell    Itching, burning, or pain in or around the vagina  It is not known what causes BV, but certain factors can make the problem more likely. This can include:    Douching    Having sex with a new partner    Having sex with more than one partner  BV will sometimes go away on its own. But treatment is usually recommended. This is because untreated BV can increase the risk of more serious health problems such as:    Pelvic inflammatory disease (PID)     delivery (giving birth to a baby early if you re pregnant)    HIV and certain other sexually transmitted diseases (STDs)    Infection after surgery on the reproductive organs  Home care  General care    BV is most often treated with medicines called antibiotics. These may be given as pills or as a vaginal cream. If antibiotics are prescribed, be sure to use them exactly as directed. Also, be sure to complete all of the medicine, even if your symptoms go away.    Don't douche or having sex during treatment.    If you have sex with a female partner, ask your healthcare provider if she should also be treated.  Prevention    Don't douche.    Don't have sex. If you do have sex, then take steps to lower your risk:  ? Use condoms when having sex.  ? Limit the number of sexual partners you have.  Follow-up care  Follow up with your healthcare provider, or as advised.  When to seek medical advice  Call your healthcare provider right away if:    You have a fever of 100.4 F (38 C) or higher, or as directed by your provider.    Your symptoms worsen, or they don t go away within a few days of starting  treatment.    You have new pain in the lower belly or pelvic region.    You have side effects that bother you or a reaction to the pills or cream you re prescribed.    You or any partners you have sex with have new symptoms, such as a rash, joint pain, or sores.  Date Last Reviewed: 10/1/2017    5199-0189 [employer]. [address] [place] [address]. All rights reserved. This information is not intended as a substitute for professional medical care. Always follow your healthcare professional's instructions.

## 2019-10-21 NOTE — PATIENT INSTRUCTIONS
Metronidazole twice a day for 7 days  Push fluids  Plenty of rest  Tylenol and ibuprofen for pain      Patient Education     Bacterial Vaginosis    You have a vaginal infection called bacterial vaginosis (BV). Both good and bad bacteria are present in a healthy vagina. BV occurs when these bacteria get out of balance. The number of bad bacteria increase. And the number of good bacteria decrease. Although BV is associated with sexual activity, it is not a sexually transmitted disease.  BV may or may not cause symptoms. If symptoms do occur, they can include:    Thin, gray, milky-white, or sometimes green discharge    Unpleasant odor or  fishy  smell    Itching, burning, or pain in or around the vagina  It is not known what causes BV, but certain factors can make the problem more likely. This can include:    Douching    Having sex with a new partner    Having sex with more than one partner  BV will sometimes go away on its own. But treatment is usually recommended. This is because untreated BV can increase the risk of more serious health problems such as:    Pelvic inflammatory disease (PID)     delivery (giving birth to a baby early if you re pregnant)    HIV and certain other sexually transmitted diseases (STDs)    Infection after surgery on the reproductive organs  Home care  General care    BV is most often treated with medicines called antibiotics. These may be given as pills or as a vaginal cream. If antibiotics are prescribed, be sure to use them exactly as directed. Also, be sure to complete all of the medicine, even if your symptoms go away.    Don't douche or having sex during treatment.    If you have sex with a female partner, ask your healthcare provider if she should also be treated.  Prevention    Don't douche.    Don't have sex. If you do have sex, then take steps to lower your risk:  ? Use condoms when having sex.  ? Limit the number of sexual partners you have.  Follow-up care  Follow up with  your healthcare provider, or as advised.  When to seek medical advice  Call your healthcare provider right away if:    You have a fever of 100.4 F (38 C) or higher, or as directed by your provider.    Your symptoms worsen, or they don t go away within a few days of starting treatment.    You have new pain in the lower belly or pelvic region.    You have side effects that bother you or a reaction to the pills or cream you re prescribed.    You or any partners you have sex with have new symptoms, such as a rash, joint pain, or sores.  Date Last Reviewed: 10/1/2017    5582-9444 [employer]. [address] [place] [address], [place] [address]. All rights reserved. This information is not intended as a substitute for professional medical care. Always follow your healthcare professional's instructions.

## 2019-10-22 DIAGNOSIS — N89.8 VAGINAL DRYNESS: ICD-10-CM

## 2019-10-22 RX ORDER — ESTRADIOL 10 UG/1
TABLET VAGINAL
Qty: 12 TABLET | Refills: 1 | OUTPATIENT
Start: 2019-10-22

## 2019-11-03 ENCOUNTER — TRANSFERRED RECORDS (OUTPATIENT)
Dept: HEALTH INFORMATION MANAGEMENT | Facility: CLINIC | Age: 64
End: 2019-11-03

## 2019-11-06 NOTE — PROGRESS NOTES
SUBJECTIVE:                                                   Namita Hernandez is a 64 year old female who presents to clinic today for the following health issue(s):  Patient presents with:  Vaginal Problem: patient has been seen recently for symptoms. had urinary issues and seen on 10/21/19, treated with bactrim. seen again 11/3/19 and was treated with flagyl for bacterial infection. patient states cervix was sensitive during this exam and states it is now also causing some urgency. has been having issues with throat irritation and waking up to sore throat, weird taste in mouth-tongue feels weird. father recently passed away. breast cancer survivor    HPI:  Patient states that she was seen 11.3 for vaginitis.  She did urine culture and wet prep at the time.  She was started on diflucan and flagyl.  Currently with pain and irritation.  She isn't currently sexual active.     Patient reports that she is currently using a tablet of estrogen approved by her oncologist.  She reports that she does have sensitive vaginal tissues.    Patient reports that she has bloating and pelvic pressure.    Patient woke up 2 days ago with acute pharyngitis.  She does not have any rashes.  She has not had any fevers or chills.    No LMP recorded. Patient is postmenopausal..     Patient is not sexually active, No obstetric history on file..  Using menopause for contraception.    reports that she quit smoking about 33 years ago. She smoked 0.00 packs per day. She has never used smokeless tobacco.    STD testing offered?  Declined    Health maintenance updated:  yes    Today's PHQ-2 Score:   PHQ-2 ( 1999 [employer]) 7/31/2019   Q1: Little interest or pleasure in doing things 0   Q2: Feeling down, depressed or hopeless 0   PHQ-2 Score 0     Today's PHQ-9 Score:   PHQ-9 SCORE 12/18/2018   PHQ-9 Total Score 2     Today's JL-7 Score:   JL-7 SCORE 12/18/2018   Total Score 0       Problem list and histories reviewed & adjusted, as  indicated.  Additional history: as documented.    Patient Active Problem List   Diagnosis     Breast cancer, right breast (H)     Multiple thyroid nodules     Cervical adenopathy     Elbow pain, left     Right shoulder pain     Hypovitaminosis D     Senile osteoporosis     Parathyroid abnormality (H)     History of colonic polyps     Sacroiliac joint pain     Right-sided low back pain with right-sided sciatica     Past Surgical History:   Procedure Laterality Date     EXCISION BENIGN LESION COMPL Right     Abdominal lipoma removed      EYE SURGERY  1960    strabismus     LUMPECTOMY BREAST WITH SENTINEL NODE, COMBINED Right 2015    Procedure: COMBINED LUMPECTOMY BREAST WITH SENTINEL NODE;  Surgeon: Yoshi Oconnor MD;  Location:  OR      Social History     Tobacco Use     Smoking status: Former Smoker     Packs/day: 0.00     Last attempt to quit: 1986     Years since quittin.8     Smokeless tobacco: Never Used   Substance Use Topics     Alcohol use: Not Currently      Problem (# of Occurrences) Relation (Name,Age of Onset)    Breast Cancer (2) Maternal Grandmother: late 50s, Mother: late 60s    Diabetes (1) Maternal Grandfather    Meniere's disease (1) Father    Thyroid Disease (1) Mother: nodlue       Negative family history of: Thyroid Cancer, Glaucoma, Macular Degeneration, Retinal detachment, Melanoma            Current Outpatient Medications   Medication Sig     alendronate (FOSAMAX) 70 MG tablet Take 1 tablet (70 mg) by mouth every 7 days     CALCIUM PO Take 2-4 tablets by mouth daily      cholecalciferol (VITAMIN D) 1000 UNIT tablet Take by mouth daily     letrozole (FEMARA) 2.5 MG tablet TAKE 1 TABLET BY MOUTH EVERY DAY     vitamin  B complex with vitamin C (VITAMIN  B COMPLEX) TABS Take 1 tablet by mouth daily     YUVAFEM 10 MCG TABS vaginal tablet INSERT 1 TABLET VAGINALLY THREE TIMES WEEKLY.     zolpidem (AMBIEN) 5 MG tablet Take 1 tablet (5 mg) by mouth nightly as needed     No current  "facility-administered medications for this visit.      Allergies   Allergen Reactions     Sulfa Drugs Hives       ROS:  12 point review of systems negative other than symptoms noted below.  Constitutional: Fatigue  Head: Earache and Sore Throat  Gastrointestinal: Diarrhea  Genitourinary: Incontinence, Pelvic Pain and Urgency    OBJECTIVE:     /80   Ht 1.664 m (5' 5.5\")   Wt 71.7 kg (158 lb)   BMI 25.89 kg/m    Body mass index is 25.89 kg/m .    Exam:  Constitutional:  Appearance: Well nourished, well developed alert, in no acute distress  Chest:  Respiratory Effort:  Breathing unlabored. Clear to auscultation bilaterally.   Cardiovascular: Heart: Auscultation:  Regular rate, normal rhythm, no murmurs present  Skin: General Inspection:  No rashes present, no lesions present, no areas of discoloration.  Neurologic:  Mental Status:  Oriented X3.  Normal strength and tone, sensory exam grossly normal, mentation intact and speech normal.    Psychiatric:  Mentation appears normal and affect normal/bright.  Pelvic Exam:  External Genitalia:     Normal appearance for age, no discharge present, no tenderness present, no inflammatory lesions present, color normal  Vagina:     Normal vaginal vault without central or paravaginal defects, no discharge present, no inflammatory lesions present, no masses present  Bladder:     Nontender to palpation  Urethra:   Urethral Body:  Urethra palpation normal, urethra structural support normal   Urethral Meatus:  No erythema or lesions present  Cervix:     Appearance healthy, no lesions present, nontender to palpation, no bleeding present  Uterus:     Uterus: firm, normal sized and nontender, anteverted in position.   Adnexa:     No adnexal tenderness present, no adnexal masses present  Perineum:     Perineum within normal limits, no evidence of trauma, no rashes or skin lesions present  Anus:     Anus within normal limits, no hemorrhoids present  Inguinal Lymph Nodes:     No " lymphadenopathy present  Pubic Hair:     Normal pubic hair distribution for age  Genitalia and Groin:     No rashes present, no lesions present, no areas of discoloration, no masses present       In-Clinic Test Results:  No results found for this or any previous visit (from the past 24 hour(s)).    ASSESSMENT/PLAN:                                                        ICD-10-CM    1. Bloating R14.0 US Transvaginal Non OB   2. Acute pharyngitis, unspecified etiology J02.9 Strep, Rapid Screen     CANCELED: Throat Culture Aerobic Bacterial   3. Urgency of urination R39.15 Urine Culture Aerobic Bacterial   4. Acute vaginitis N76.0 Wet prep     Gram stain   5. Other fatigue R53.83 TSH with free T4 reflex       Patient Instructions   Throat culture today  Urine culture today  Labs  Discussed osphena with your oncologist  Return for ultrasound  Vaginal cultures we will call with    Discussed all of patient's concerns.  Discussed her history of breast cancer and tamoxifen use.  With her bloating and pelvic pain as well as her tamoxifen use we will obtain an ultrasound to evaluate her pelvic structures.  Also discussed her vaginal cultures we will obtain those as well.  Her exam was completely normal no concerns for discharge.    Discussed her fatigue this may be associated with the recent passing of her father.  Discussed we do need to watch her for signs of depression.    Discussed Osphena at the mechanism of action she is going to contact her oncologist to see if this is appropriate for her.    1. Ultrasound  2. Vaginal cultures  3. Throat culture today  4. Consider osphena- pain may be associated with her vaginal atrophy  5. Fatigue- may be associated with stress, recently her father .  Thyroid testing today.    30 minutes was spent face to face with the patient today discussing her history, diagnosis, and follow-up plan as noted above. Over 50% of the visit was spent in counseling and coordination of  care.          Sushila Call MD  [facility] FOR [facility]

## 2019-11-07 ENCOUNTER — OFFICE VISIT (OUTPATIENT)
Dept: OBGYN | Facility: CLINIC | Age: 64
End: 2019-11-07
Payer: COMMERCIAL

## 2019-11-07 VITALS
SYSTOLIC BLOOD PRESSURE: 134 MMHG | DIASTOLIC BLOOD PRESSURE: 80 MMHG | WEIGHT: 158 LBS | BODY MASS INDEX: 25.39 KG/M2 | HEIGHT: 66 IN

## 2019-11-07 DIAGNOSIS — R53.83 OTHER FATIGUE: ICD-10-CM

## 2019-11-07 DIAGNOSIS — N76.0 ACUTE VAGINITIS: ICD-10-CM

## 2019-11-07 DIAGNOSIS — J02.9 ACUTE PHARYNGITIS, UNSPECIFIED ETIOLOGY: ICD-10-CM

## 2019-11-07 DIAGNOSIS — R14.0 BLOATING: Primary | ICD-10-CM

## 2019-11-07 DIAGNOSIS — R39.15 URGENCY OF URINATION: ICD-10-CM

## 2019-11-07 LAB
DEPRECATED S PYO AG THROAT QL EIA: NORMAL
GRAM STN SPEC: ABNORMAL
SPECIMEN SOURCE: ABNORMAL
SPECIMEN SOURCE: NORMAL
SPECIMEN SOURCE: NORMAL
WET PREP SPEC: NORMAL

## 2019-11-07 PROCEDURE — 84443 ASSAY THYROID STIM HORMONE: CPT | Performed by: OBSTETRICS & GYNECOLOGY

## 2019-11-07 PROCEDURE — 87210 SMEAR WET MOUNT SALINE/INK: CPT | Performed by: OBSTETRICS & GYNECOLOGY

## 2019-11-07 PROCEDURE — 87880 STREP A ASSAY W/OPTIC: CPT | Performed by: OBSTETRICS & GYNECOLOGY

## 2019-11-07 PROCEDURE — 36415 COLL VENOUS BLD VENIPUNCTURE: CPT | Performed by: OBSTETRICS & GYNECOLOGY

## 2019-11-07 PROCEDURE — 87081 CULTURE SCREEN ONLY: CPT | Mod: 59 | Performed by: OBSTETRICS & GYNECOLOGY

## 2019-11-07 PROCEDURE — 87205 SMEAR GRAM STAIN: CPT | Performed by: OBSTETRICS & GYNECOLOGY

## 2019-11-07 PROCEDURE — 99203 OFFICE O/P NEW LOW 30 MIN: CPT | Performed by: OBSTETRICS & GYNECOLOGY

## 2019-11-07 PROCEDURE — 87086 URINE CULTURE/COLONY COUNT: CPT | Performed by: OBSTETRICS & GYNECOLOGY

## 2019-11-07 ASSESSMENT — MIFFLIN-ST. JEOR: SCORE: 1280.49

## 2019-11-07 NOTE — PATIENT INSTRUCTIONS
Throat culture today  Urine culture today  Labs  Discussed osphena with your oncologist  Return for ultrasound  Vaginal cultures we will call with

## 2019-11-08 ENCOUNTER — TELEPHONE (OUTPATIENT)
Dept: OBGYN | Facility: CLINIC | Age: 64
End: 2019-11-08

## 2019-11-08 LAB
BACTERIA SPEC CULT: NORMAL
SPECIMEN SOURCE: NORMAL
TSH SERPL DL<=0.005 MIU/L-ACNC: 3.76 MU/L (ref 0.4–4)

## 2019-11-08 NOTE — TELEPHONE ENCOUNTER
Pt calling in regards to the Gram Stain results 11/7/19  Wanting to know what else Dr Call recommends as she is still experiencing lower pelvic discomfort and soreness, urgency to urinate and chills.   Routing to Dr Call to advise further on current sx's/results.    Aracely Villatoro RN on 11/8/2019 at 11:31 AM

## 2019-11-08 NOTE — TELEPHONE ENCOUNTER
Patient saw some results on My Chart and wants to ask a question about them. Please call patient back.

## 2019-11-08 NOTE — TELEPHONE ENCOUNTER
Called pt with Dr Call's response.  Pt does have US/visit scheduled 11/19/19   Would like it sooner and aware this was the earliest. Recommended calling Monday to check on cancellations. Pt has previous PCP at the [facility] and will be calling there next to see if she can be evaluated earlier.  Pt verbalized understanding, in agreement with plan, and voiced no further questions.  Aracely Villatoro RN on 11/8/2019 at 11:45 AM

## 2019-11-09 LAB
BACTERIA SPEC CULT: NO GROWTH
Lab: NORMAL
SPECIMEN SOURCE: NORMAL

## 2019-11-11 ENCOUNTER — ANCILLARY PROCEDURE (OUTPATIENT)
Dept: ULTRASOUND IMAGING | Facility: CLINIC | Age: 64
End: 2019-11-11
Attending: OBSTETRICS & GYNECOLOGY
Payer: COMMERCIAL

## 2019-11-11 DIAGNOSIS — R14.0 BLOATING: ICD-10-CM

## 2019-11-19 NOTE — PROGRESS NOTES
SUBJECTIVE:                                                   Namita Hernandez is a 64 year old female who presents to clinic today for the following health issue(s):  Patient presents with:  Follow Up: possible EMB      Additional information: was attacked by step cierra's dog over the weekend. Was at [place] until yesterday. Currently on antibiotics for wounds. Having bad diarrhea due to medications.    HPI:  Patient presenting for follow up.  She is currently on tamoxifen for breast cancer.  Her endometrial stripe was 5 mm.  She likely has endometrial polyps.  She continues to have the cramping and bloating.    Patient was in [place].  She was attacked by her stepson's dog.  She was in the hospital for 3 days on IV antibiotics.  She does have wounds on her bilateral upper extremities.    No LMP recorded. Patient is postmenopausal..     Patient is not sexually active, .  Using menopause for contraception.    reports that she quit smoking about 33 years ago. She smoked 0.00 packs per day. She has never used smokeless tobacco.    STD testing offered?  Declined    Health maintenance updated:  yes    Today's PHQ-2 Score:   PHQ-2 (  [employer]) 2019   Q1: Little interest or pleasure in doing things 0   Q2: Feeling down, depressed or hopeless 0   PHQ-2 Score 0     Today's PHQ-9 Score:   PHQ-9 SCORE 2018   PHQ-9 Total Score 2     Today's JL-7 Score:   JL-7 SCORE 2018   Total Score 0       Problem list and histories reviewed & adjusted, as indicated.  Additional history: as documented.    Patient Active Problem List   Diagnosis     Breast cancer, right breast (H)     Multiple thyroid nodules     Cervical adenopathy     Elbow pain, left     Right shoulder pain     Hypovitaminosis D     Senile osteoporosis     Parathyroid abnormality (H)     History of colonic polyps     Sacroiliac joint pain     Right-sided low back pain with right-sided sciatica     Past Surgical History:   Procedure Laterality Date      EXCISION BENIGN LESION COMPL Right     Abdominal lipoma removed      EYE SURGERY      strabismus     LUMPECTOMY BREAST WITH SENTINEL NODE, COMBINED Right 2015    Procedure: COMBINED LUMPECTOMY BREAST WITH SENTINEL NODE;  Surgeon: Yoshi Oconnor MD;  Location:  OR      Social History     Tobacco Use     Smoking status: Former Smoker     Packs/day: 0.00     Last attempt to quit: 1986     Years since quittin.9     Smokeless tobacco: Never Used   Substance Use Topics     Alcohol use: Not Currently      Problem (# of Occurrences) Relation (Name,Age of Onset)    Breast Cancer (2) Maternal Grandmother: late 50s, Mother: late 60s    Diabetes (1) Maternal Grandfather    Meniere's disease (1) Father    Thyroid Disease (1) Mother: nodlue       Negative family history of: Thyroid Cancer, Glaucoma, Macular Degeneration, Retinal detachment, Melanoma            Current Outpatient Medications   Medication Sig     acetaminophen (TYLENOL) 500 MG tablet Take 1,000 mg by mouth     alendronate (FOSAMAX) 70 MG tablet Take 1 tablet (70 mg) by mouth every 7 days     amoxicillin-clavulanate (AUGMENTIN) 875-125 MG tablet Take 1 tablet by mouth     CALCIUM PO Take 2-4 tablets by mouth daily      cholecalciferol (VITAMIN D) 1000 UNIT tablet Take by mouth daily     letrozole (FEMARA) 2.5 MG tablet TAKE 1 TABLET BY MOUTH EVERY DAY     tolterodine (DETROL) 1 MG tablet Take 1 tablet (1 mg) by mouth 2 times daily     vitamin  B complex with vitamin C (VITAMIN  B COMPLEX) TABS Take 1 tablet by mouth daily     YUVAFEM 10 MCG TABS vaginal tablet INSERT 1 TABLET VAGINALLY THREE TIMES WEEKLY.     zolpidem (AMBIEN) 5 MG tablet Take 1 tablet (5 mg) by mouth nightly as needed     No current facility-administered medications for this visit.      Allergies   Allergen Reactions     Sulfa Drugs Hives       ROS:  12 point review of systems negative other than symptoms noted below or in the HPI.  Urgency of urination      OBJECTIVE:  "    /70   Ht 1.664 m (5' 5.5\")   Wt 74.1 kg (163 lb 6.4 oz)   BMI 26.78 kg/m    Body mass index is 26.78 kg/m .    Exam:  Chest:  Respiratory Effort:  Breathing unlabored.    Cardiovascular: no edema  Neurologic:  Mental Status:  Oriented X3.  Normal strength and tone, sensory exam grossly normal, mentation intact and speech normal.    Psychiatric:  Mentation appears normal and affect normal/bright.     In-Clinic Test Results:  No results found for this or any previous visit (from the past 24 hour(s)).    ASSESSMENT/PLAN:                                                        ICD-10-CM    1. Urge incontinence N39.41 tolterodine (DETROL) 1 MG tablet   2. Endometrial polyp N84.0    3. Dog bite, subsequent encounter W54.0XXD        There are no Patient Instructions on file for this visit.    1. Endometrial polyps-discussed the polyps are likely associated with the tamoxifen.  Discussed reassured that she is not having any vaginal bleeding.  Patient would like a hysteroscopy, dilation curettage with MyoSure so that she can send this to pathology.  She is anxious with her medical history.  2.  Vaginal atrophy-she is good to talk with her oncologist to see if Osphena is appropriate for her.  3.  Covering from dog attack- she is to come in next week to have her wounds checked.  4.  Urinary urgency-she is going to try Detrol to see if that alleviates her symptoms.    20  minutes was spent face to face with the patient today discussing her history, diagnosis, and follow-up plan as noted above. Over 50% of the visit was spent in counseling and coordination of care.    Sushila Call MD  [facility] [place]  "

## 2019-11-21 ENCOUNTER — OFFICE VISIT (OUTPATIENT)
Dept: OBGYN | Facility: CLINIC | Age: 64
End: 2019-11-21
Payer: COMMERCIAL

## 2019-11-21 VITALS
DIASTOLIC BLOOD PRESSURE: 70 MMHG | BODY MASS INDEX: 26.26 KG/M2 | WEIGHT: 163.4 LBS | HEIGHT: 66 IN | SYSTOLIC BLOOD PRESSURE: 138 MMHG

## 2019-11-21 DIAGNOSIS — N39.41 URGE INCONTINENCE: ICD-10-CM

## 2019-11-21 DIAGNOSIS — N84.0 ENDOMETRIAL POLYP: Primary | ICD-10-CM
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
## 2019-11-21 PROCEDURE — 99213 OFFICE O/P EST LOW 20 MIN: CPT | Performed by: OBSTETRICS & GYNECOLOGY

## 2019-11-21 RX ORDER — TOLTERODINE TARTRATE 1 MG/1
1 TABLET, EXTENDED RELEASE ORAL 2 TIMES DAILY
Qty: 30 TABLET | Refills: 3 | Status: SHIPPED | OUTPATIENT
Start: 2019-11-21 | End: 2020-03-06

## 2019-11-21 RX ORDER — ACETAMINOPHEN 500 MG
1000 TABLET ORAL
COMMUNITY
Start: 2019-11-20 | End: 2020-07-17

## 2019-11-21 ASSESSMENT — MIFFLIN-ST. JEOR: SCORE: 1304.99

## 2019-11-22 ENCOUNTER — TELEPHONE (OUTPATIENT)
Dept: OBGYN | Facility: CLINIC | Age: 64
End: 2019-11-22

## 2019-11-22 ENCOUNTER — PREP FOR PROCEDURE (OUTPATIENT)
Dept: OBGYN | Facility: CLINIC | Age: 64
End: 2019-11-22

## 2019-11-22 DIAGNOSIS — N84.0 ENDOMETRIAL POLYP: Primary | ICD-10-CM

## 2019-11-22 DIAGNOSIS — G47.09 OTHER INSOMNIA: ICD-10-CM

## 2019-11-22 NOTE — TELEPHONE ENCOUNTER
SENT FOR 2nd SIGN    Cathleen Medina  Surgery Scheduler      MYOSURE REP REQUESTED  PREOP PCP    Procedure name(s) - multi select hysteroscopy, dilation and curettage, myosure  Is this a multi surgeon case? No  Laterality N/A  Reason for procedure endometrial polyp  Location of Case: [facility] OR  Surgeon Procedure Time (incision to closure) in minutes (per procedure as applicable) 30  Comment: mins  Note:  Surgical Case Time Needed (in minutes)  Patient Class (for admit prior to surgery, specify number of days in comments): Same day (hospital outpatient)  Why can't this outpatient surgery be done at the [facility] or  ASC? na  Anesthesia General  H&P To Be Completed By: PCP  Vendor Needed? Yes  Specify vendor: greg     (3) no apparent problem

## 2019-11-25 PROBLEM — N84.0 ENDOMETRIAL POLYP: Status: ACTIVE | Noted: 2019-11-25

## 2019-11-25 NOTE — TELEPHONE ENCOUNTER
Type of surgery: HSC D&C w/MYOSURE  Location of surgery: [facility] OR  Date and time of surgery: 12/18/2019 10:30a  Surgeon: CHRIS  Pre-Op Appt Date: ABHAY TESFAYE  Post-Op Appt Date: TBD   Packet sent out: MAILED 11/25/2019  Pre-cert/Authorization completed:  TBD  Date: 11/25/2019 James dimas/Evie Medina  Surgery Scheduler    DX N84.0  CPT 63251

## 2019-11-25 NOTE — PROGRESS NOTES
SUBJECTIVE:                                                   Namita Hernandez is a 64 year old female who presents to clinic today for the following health issue(s):  Patient presents with:  RECHECK: wound check        HPI:  Patient was attacked by a dog.  She was hospitalized for 3 days.  She was on IV antibiotics.  She has had no fevers or chills.  She has been frequently changing bandages.  She is on antibiotics at this time.    No LMP recorded. Patient is postmenopausal..     Patient is not sexually active, .  Using not sexually active for contraception.    reports that she quit smoking about 33 years ago. She smoked 0.00 packs per day. She has never used smokeless tobacco.    STD testing offered?  Declined    Health maintenance updated:  yes    Today's PHQ-2 Score:   PHQ-2 (  [employer]) 2019   Q1: Little interest or pleasure in doing things 0   Q2: Feeling down, depressed or hopeless 0   PHQ-2 Score 0     Today's PHQ-9 Score:   PHQ-9 SCORE 2018   PHQ-9 Total Score 2     Today's JL-7 Score:   JL-7 SCORE 2018   Total Score 0       Problem list and histories reviewed & adjusted, as indicated.  Additional history: as documented.    Patient Active Problem List   Diagnosis     Breast cancer, right breast (H)     Multiple thyroid nodules     Cervical adenopathy     Elbow pain, left     Right shoulder pain     Hypovitaminosis D     Senile osteoporosis     Parathyroid abnormality (H)     History of colonic polyps     Sacroiliac joint pain     Right-sided low back pain with right-sided sciatica     Endometrial polyp     Past Surgical History:   Procedure Laterality Date     EXCISION BENIGN LESION COMPL Right     Abdominal lipoma removed      EYE SURGERY  1960    strabismus     LUMPECTOMY BREAST WITH SENTINEL NODE, COMBINED Right 2015    Procedure: COMBINED LUMPECTOMY BREAST WITH SENTINEL NODE;  Surgeon: Yoshi Oconnor MD;  Location:  OR      Social History     Tobacco Use     Smoking  "status: Former Smoker     Packs/day: 0.00     Last attempt to quit: 1986     Years since quittin.9     Smokeless tobacco: Never Used   Substance Use Topics     Alcohol use: Not Currently      Problem (# of Occurrences) Relation (Name,Age of Onset)    Breast Cancer (2) Maternal Grandmother: late 50s, Mother: late 60s    Diabetes (1) Maternal Grandfather    Meniere's disease (1) Father    Thyroid Disease (1) Mother: estiven       Negative family history of: Thyroid Cancer, Glaucoma, Macular Degeneration, Retinal detachment, Melanoma            Current Outpatient Medications   Medication Sig     acetaminophen (TYLENOL) 500 MG tablet Take 1,000 mg by mouth     alendronate (FOSAMAX) 70 MG tablet Take 1 tablet (70 mg) by mouth every 7 days     amoxicillin-clavulanate (AUGMENTIN) 875-125 MG tablet Take 1 tablet by mouth     CALCIUM PO Take 2-4 tablets by mouth daily      cephALEXin (KEFLEX) 500 MG capsule Take 1 capsule (500 mg) by mouth 2 times daily     cholecalciferol (VITAMIN D) 1000 UNIT tablet Take by mouth daily     letrozole (FEMARA) 2.5 MG tablet TAKE 1 TABLET BY MOUTH EVERY DAY     tolterodine (DETROL) 1 MG tablet Take 1 tablet (1 mg) by mouth 2 times daily     vitamin  B complex with vitamin C (VITAMIN  B COMPLEX) TABS Take 1 tablet by mouth daily     YUVAFEM 10 MCG TABS vaginal tablet INSERT 1 TABLET VAGINALLY THREE TIMES WEEKLY.     zolpidem (AMBIEN) 5 MG tablet Take 1 tablet (5 mg) by mouth nightly as needed     No current facility-administered medications for this visit.      Allergies   Allergen Reactions     Sulfa Drugs Hives       ROS:  12 point review of systems negative other than symptoms noted below or in the HPI.  No urinary frequency or dysuria, bladder or kidney problems      OBJECTIVE:     /76   Pulse 68   Ht 1.664 m (5' 5.5\")   Wt 74.8 kg (165 lb)   BMI 27.04 kg/m    Body mass index is 27.04 kg/m .    Exam:    Chest:  Respiratory Effort:  Breathing unlabored.   Skin: right arm " wound 3 mm x 3mm deep.  No purulance.  Some erythema at edges of skin, recovered while in office.  The other lesions with no cellulitis and healing  Neurologic:  Mental Status:  Oriented X3.  Normal strength and tone, sensory exam grossly normal, mentation intact and speech normal.    Psychiatric:  Mentation appears normal and affect normal/bright.     In-Clinic Test Results:  No results found for this or any previous visit (from the past 24 hour(s)).    ASSESSMENT/PLAN:                                                        ICD-10-CM    1. Dog bite, subsequent encounter W54.0XXD cephALEXin (KEFLEX) 500 MG capsule       There are no Patient Instructions on file for this visit.    1. Continue bandage changes and antibiotics.  She is going to watch for signs of infection.  Wounds examined, new antibiotic perscribed due to erythema on right arm wound.  Will watch that closely.    Sushila Call MD  [facility] FOR [facility]

## 2019-11-26 ENCOUNTER — OFFICE VISIT (OUTPATIENT)
Dept: OBGYN | Facility: CLINIC | Age: 64
End: 2019-11-26
Payer: COMMERCIAL

## 2019-11-26 VITALS
SYSTOLIC BLOOD PRESSURE: 118 MMHG | DIASTOLIC BLOOD PRESSURE: 76 MMHG | HEART RATE: 68 BPM | HEIGHT: 66 IN | WEIGHT: 165 LBS | BODY MASS INDEX: 26.52 KG/M2
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
## 2019-11-26 PROCEDURE — 99214 OFFICE O/P EST MOD 30 MIN: CPT | Performed by: OBSTETRICS & GYNECOLOGY

## 2019-11-26 RX ORDER — CEPHALEXIN 500 MG/1
500 CAPSULE ORAL 2 TIMES DAILY
Qty: 20 CAPSULE | Refills: 0 | Status: ON HOLD | OUTPATIENT
Start: 2019-11-26 | End: 2019-12-18

## 2019-11-26 ASSESSMENT — MIFFLIN-ST. JEOR: SCORE: 1312.25

## 2019-11-29 ENCOUNTER — MYC MEDICAL ADVICE (OUTPATIENT)
Dept: OBGYN | Facility: CLINIC | Age: 64
End: 2019-11-29

## 2019-11-29 RX ORDER — ZOLPIDEM TARTRATE 5 MG/1
TABLET ORAL
Qty: 60 TABLET | Refills: 0 | OUTPATIENT
Start: 2019-11-29

## 2019-11-29 NOTE — TELEPHONE ENCOUNTER
Routing pt mychart message to provider to advise.    From 11/26/19  Continue bandage changes and antibiotics.  She is going to watch for signs of infection.  Wounds examined, new antibiotic perscribed due to erythema on right arm wound.  Will watch that closely.    Annabella Nguyen RN on 11/29/2019 at 3:25 PM

## 2019-12-03 NOTE — TELEPHONE ENCOUNTER
Spoke to pt. Due to equipment issues this case has moved as follows.  Spoke w/[facility] for changes  Spoke with patient and she understands current timing as follows    12/18/2019 8:35am with a 6:30 am arrival time    EXCEL Epic updated    Cathleen Medina  Surgery Scheduler

## 2019-12-05 ENCOUNTER — MYC MEDICAL ADVICE (OUTPATIENT)
Dept: OBGYN | Facility: CLINIC | Age: 64
End: 2019-12-05

## 2019-12-05 DIAGNOSIS — N94.10 DYSPAREUNIA, FEMALE: Primary | ICD-10-CM

## 2019-12-10 ENCOUNTER — OFFICE VISIT (OUTPATIENT)
Dept: INTERNAL MEDICINE | Facility: CLINIC | Age: 64
End: 2019-12-10
Payer: COMMERCIAL

## 2019-12-10 VITALS
BODY MASS INDEX: 26.71 KG/M2 | OXYGEN SATURATION: 97 % | DIASTOLIC BLOOD PRESSURE: 85 MMHG | WEIGHT: 163 LBS | HEART RATE: 79 BPM | SYSTOLIC BLOOD PRESSURE: 123 MMHG

## 2019-12-10 DIAGNOSIS — D64.9 ANEMIA, UNSPECIFIED TYPE: ICD-10-CM

## 2019-12-10 DIAGNOSIS — Z01.818 PRE-OPERATIVE EXAMINATION: ICD-10-CM

## 2019-12-10 DIAGNOSIS — Z23 NEED FOR VACCINATION: Primary | ICD-10-CM

## 2019-12-10 LAB
ANION GAP SERPL CALCULATED.3IONS-SCNC: 2 MMOL/L (ref 3–14)
BUN SERPL-MCNC: 14 MG/DL (ref 7–30)
CALCIUM SERPL-MCNC: 9.6 MG/DL (ref 8.5–10.1)
CHLORIDE SERPL-SCNC: 104 MMOL/L (ref 94–109)
CO2 SERPL-SCNC: 32 MMOL/L (ref 20–32)
CREAT SERPL-MCNC: 0.72 MG/DL (ref 0.52–1.04)
ERYTHROCYTE [DISTWIDTH] IN BLOOD BY AUTOMATED COUNT: 12.6 % (ref 10–15)
GFR SERPL CREATININE-BSD FRML MDRD: 88 ML/MIN/{1.73_M2}
GLUCOSE SERPL-MCNC: 105 MG/DL (ref 70–99)
HCT VFR BLD AUTO: 40.7 % (ref 35–47)
HGB BLD-MCNC: 13.3 G/DL (ref 11.7–15.7)
MAGNESIUM SERPL-MCNC: 2.3 MG/DL (ref 1.6–2.3)
MCH RBC QN AUTO: 32.2 PG (ref 26.5–33)
MCHC RBC AUTO-ENTMCNC: 32.7 G/DL (ref 31.5–36.5)
MCV RBC AUTO: 99 FL (ref 78–100)
PLATELET # BLD AUTO: 214 10E9/L (ref 150–450)
POTASSIUM SERPL-SCNC: 4 MMOL/L (ref 3.4–5.3)
RBC # BLD AUTO: 4.13 10E12/L (ref 3.8–5.2)
SODIUM SERPL-SCNC: 137 MMOL/L (ref 133–144)
WBC # BLD AUTO: 5.3 10E9/L (ref 4–11)

## 2019-12-10 NOTE — NURSING NOTE
Chief Complaint   Patient presents with     Pre-Op Exam     HYSTEROSCOPY DILATION AND CURRETAGE WITH MYOSURE DOS: 12-18-19 with Dr. Kellie Lease Stecher Kimberly Nissen, EMT at 2:08 PM on 12/10/2019

## 2019-12-10 NOTE — LETTER
12/10/2019      RE: Namita GÓMEZ [facility]  [address] [place] [address]       PRE-OP EVALUATION:    Proposed Surgery/ Procedure: hysteroscopy, dilation curettage with MyoSure   Date of Surgery/ Procedure: 12/18/19  Hospital/Surgical Facility: [facility]  Primary Physician:  None  Type of Anesthesia Anticipated:  General          HPI:        Namita is a 64 year old female patient with a past medical hx of right breast cancer first diagnosed in 2015 s/o radiotherapoy and chemotherapy, currently on Tamoxifen. She is here today for pre-operative cardiovascular and pulmonary risk assessment.  The proposed surgery is: hysteroscopy, dilation curettage with MyoSure   History leading up to the need for surgery: Patient around month ago, started to have what she describe as UTI symptoms for which she went to urgent clinic and was prescribied antibiotics. Patient did not get any better on antibiotics and went again for urgent care clinic and was treated for vaginal bacteriosis which again did not improve patient's symptoms. Patient then went and saw OBGYN, and found to have a very tender cervix on pelvic examination s/p pelvic US which showed a thinkening of the endometrium around 5 mm with ? endometrial polyp.    Patient denied any chest pain, SOB, dyspnea on exertion. She can go 2 flights of stairs without stopping. She denied any personal hx of cardiovascular diseases.       Cardiac risks: <1% per Jimenez NESHA  Pulmonary risks:<1%  Exercise tolerance: > 4 MET  Personal history of bleeding tendencies/disorders: None  Family history of bleeding tendencies/disorders: None  Personal history of adverse anesthesia reactions: None  Family history of adverse anesthesia reactions: None   Personal history of unanticipated surgical complications: None                                                                                                                                                         .     MEDICAL  HISTORY:     Past Medical History:   Diagnosis Date     Breast cancer (H) 2015     Colon polyps      Hypovitaminosis D      Kidney stone      Multiple thyroid nodules     right dominant 1.3 cm; benign cytology 5/15     Osteoporosis 7/15    lowest T-score -3.2 33% radius     Pancreatic divisum      Recurrent UTI     since menopause (age 47) 3 x per year     Past Surgical History:   Procedure Laterality Date     EXCISION BENIGN LESION COMPL Right     Abdominal lipoma removed      EYE SURGERY  1960    strabismus     LUMPECTOMY BREAST WITH SENTINEL NODE, COMBINED Right 2015    Procedure: COMBINED LUMPECTOMY BREAST WITH SENTINEL NODE;  Surgeon: Yoshi Oconnor MD;  Location:  OR     Family History   Problem Relation Age of Onset     Breast Cancer Maternal Grandmother         late 50s     Breast Cancer Mother         late 60s     Thyroid Disease Mother         nodlue     Diabetes Maternal Grandfather      Meniere's disease Father      Thyroid Cancer No family hx of      Glaucoma No family hx of      Macular Degeneration No family hx of      Retinal detachment No family hx of      Melanoma No family hx of      Social History     Tobacco Use     Smoking status: Former Smoker     Packs/day: 0.00     Last attempt to quit: 1986     Years since quittin.9     Smokeless tobacco: Never Used   Substance Use Topics     Alcohol use: Not Currently     Drug use: Never        Allergies   Allergen Reactions     Sulfa Drugs Hives     Latex Allergy: NO    Current Outpatient Medications   Medication     acetaminophen (TYLENOL) 500 MG tablet     alendronate (FOSAMAX) 70 MG tablet     amoxicillin-clavulanate (AUGMENTIN) 875-125 MG tablet     CALCIUM PO     cephALEXin (KEFLEX) 500 MG capsule     cholecalciferol (VITAMIN D) 1000 UNIT tablet     letrozole (FEMARA) 2.5 MG tablet     ospemifene (OSPHENA) 60 MG tablet     tolterodine (DETROL) 1 MG tablet     vitamin  B complex with vitamin C (VITAMIN  B COMPLEX) TABS  "    YUVAFEM 10 MCG TABS vaginal tablet     zolpidem (AMBIEN) 5 MG tablet     No current facility-administered medications for this visit.      REVIEW OF SYSTEMS:      Pertinent questions are noted below.  Answers are \"NO\" unless otherwise noted:    1.  - Do you have a history of heart attack, stroke, stent, bypass or surgery on an artery in the head, neck, heart or legs?  2.  - Do you ever have any pain or discomfort in your chest?   3. - Do you have a history of  Heart Failure?  4. - Are you troubled by shortness of breath when: walking on the level, up a slight hill or at night?  5. - Do you currently have a cold, bronchitis or other respiratory infection?  6. - Do you have a cough, shortness of breath or wheezing?  7. - Do you sometimes get pains in the calves of your legs when you walk?  8. - Do you or anyone in your family have previous history of blood clots?  9. - Do you or does anyone in your family have a serious bleeding problem such as prolonged bleeding following surgeries or cuts?  10. - Have you ever had problems with anemia or been told to take iron pills?  11. - Have you had any abnormal blood loss such as black, tarry or bloody stools, or abnormal vaginal bleeding?  12. - Have you ever had a blood transfusion?  13. - Have you or any of your relatives ever had problems with anesthesia?  14. - Do you have sleep apnea, excessive snoring or daytime drowsiness?  15. - Do you have any prosthetic heart valves?  16. - Do you have prosthetic joints?  17. - Is there any chance that you may be pregnant?     EXAM:   /85   Pulse 79   Wt 73.9 kg (163 lb)   SpO2 97%   BMI 26.71 kg/m       Physical Exam  Constitutional:       Appearance: Normal appearance.   HENT:      Head: Normocephalic and atraumatic.   Eyes:      Pupils: Pupils are equal, round, and reactive to light.   Neck:      Musculoskeletal: Neck supple.   Cardiovascular:      Rate and Rhythm: Normal rate and regular rhythm.      Pulses: Normal " pulses.      Heart sounds: Normal heart sounds, S1 normal and S2 normal.   Pulmonary:      Effort: Pulmonary effort is normal.      Breath sounds: Normal breath sounds and air entry.   Abdominal:      General: Abdomen is flat. Bowel sounds are normal.      Palpations: Abdomen is soft.   Skin:     General: Skin is warm and dry.      Capillary Refill: Capillary refill takes less than 2 seconds.   Neurological:      Mental Status: She is alert.   Psychiatric:         Mood and Affect: Mood normal.         Behavior: Behavior normal.         DIAGNOSTICS:     -ECG showed sinus rhythm without any ST segment abnormalities, no Q waves, no QT prolongation no T wave abnormalities.   I reviewed the EKG and concur with the resident physician's interpretation.  Dr. Ballesteros    Component      Latest Ref Rng & Units 12/10/2019   Sodium      133 - 144 mmol/L 137   Potassium      3.4 - 5.3 mmol/L 4.0   Chloride      94 - 109 mmol/L 104   Carbon Dioxide      20 - 32 mmol/L 32   Anion Gap      3 - 14 mmol/L 2 (L)   Glucose      70 - 99 mg/dL 105 (H)   Urea Nitrogen      7 - 30 mg/dL 14   Creatinine      0.52 - 1.04 mg/dL 0.72   GFR Estimate      >60 mL/min/1.73:m2 88   GFR Estimate If Black      >60 mL/min/1.73:m2 >90   Calcium      8.5 - 10.1 mg/dL 9.6   WBC      4.0 - 11.0 10e9/L 5.3   RBC Count      3.8 - 5.2 10e12/L 4.13   Hemoglobin      11.7 - 15.7 g/dL 13.3   Hematocrit      35.0 - 47.0 % 40.7   MCV      78 - 100 fl 99   MCH      26.5 - 33.0 pg 32.2   MCHC      31.5 - 36.5 g/dL 32.7   RDW      10.0 - 15.0 % 12.6   Platelet Count      150 - 450 10e9/L 214   Magnesium      1.6 - 2.3 mg/dL 2.3       IMPRESSION:       The proposed surgical procedure is considered to be low risk.  Patient has a low cardiovascular and a pulmonary risk profile      The patient has no additional risks for perioperative complications    Giving her most recent anemia, hypokalemia and hypomagnesemia; would get any ECG and repeat a CBC and a BMP with mag.         RECOMMENDATIONS:     May proceed with proposed procedure, without any other diagnositic tests and/or specialist consultation(s)      Instructions for home medications patsy-operatively discussed with patient verbally and in writing       Patient encouraged to re-establish care with a new PCP at [facility] [place]      Alphonso Langston M.D.  Internal Medicine-PGY-1  [facility] [place]   pager [phone]    Teaching Physician Note:  I was present during the visit and the patient was seen and examined by me.   I discussed the case with the resident and agree with the note as documented by the resident with the following exceptions:  None.    Adilene Ballesteros M.D.  Internal Medicine   pager [phone]        Alphonso Langston MD

## 2019-12-10 NOTE — PROGRESS NOTES
PRE-OP EVALUATION:    Proposed Surgery/ Procedure: hysteroscopy, dilation curettage with MyoSure   Date of Surgery/ Procedure: 12/18/19  Hospital/Surgical Facility: [facility]  Primary Physician: None  Type of Anesthesia Anticipated: General          HPI:        Namita is a 64 year old female patient with a past medical hx of right breast cancer first diagnosed in 2015 s/o radiotherapoy and chemotherapy, currently on Tamoxifen. She is here today for pre-operative cardiovascular and pulmonary risk assessment.  The proposed surgery is: hysteroscopy, dilation curettage with MyoSure   History leading up to the need for surgery: Patient around month ago, started to have what she describe as UTI symptoms for which she went to urgent clinic and was prescribied antibiotics. Patient did not get any better on antibiotics and went again for urgent care clinic and was treated for vaginal bacteriosis which again did not improve patient's symptoms. Patient then went and saw OBGYN, and found to have a very tender cervix on pelvic examination s/p pelvic US which showed a thinkening of the endometrium around 5 mm with ? endometrial polyp.    Patient denied any chest pain, SOB, dyspnea on exertion. She can go 2 flights of stairs without stopping. She denied any personal hx of cardiovascular diseases.       Cardiac risks: <1% per Jimenez NESHA  Pulmonary risks:<1%  Exercise tolerance: > 4 MET  Personal history of bleeding tendencies/disorders: None  Family history of bleeding tendencies/disorders: None  Personal history of adverse anesthesia reactions: None  Family history of adverse anesthesia reactions: None   Personal history of unanticipated surgical complications: None                                                                                                                                                         .     MEDICAL HISTORY:     Past Medical History:   Diagnosis Date     Breast cancer (H) 4/2015      Colon polyps      Hypovitaminosis D      Kidney stone      Multiple thyroid nodules     right dominant 1.3 cm; benign cytology 5/15     Osteoporosis 7/15    lowest T-score -3.2 33% radius     Pancreatic divisum      Recurrent UTI     since menopause (age 47) 3 x per year     Past Surgical History:   Procedure Laterality Date     EXCISION BENIGN LESION COMPL Right     Abdominal lipoma removed      EYE SURGERY  1960    strabismus     LUMPECTOMY BREAST WITH SENTINEL NODE, COMBINED Right 2015    Procedure: COMBINED LUMPECTOMY BREAST WITH SENTINEL NODE;  Surgeon: Yoshi Oconnor MD;  Location:  OR     Family History   Problem Relation Age of Onset     Breast Cancer Maternal Grandmother         late 50s     Breast Cancer Mother         late 60s     Thyroid Disease Mother         nodlue     Diabetes Maternal Grandfather      Meniere's disease Father      Thyroid Cancer No family hx of      Glaucoma No family hx of      Macular Degeneration No family hx of      Retinal detachment No family hx of      Melanoma No family hx of      Social History     Tobacco Use     Smoking status: Former Smoker     Packs/day: 0.00     Last attempt to quit: 1986     Years since quittin.9     Smokeless tobacco: Never Used   Substance Use Topics     Alcohol use: Not Currently     Drug use: Never        Allergies   Allergen Reactions     Sulfa Drugs Hives     Latex Allergy: NO    Current Outpatient Medications   Medication     acetaminophen (TYLENOL) 500 MG tablet     alendronate (FOSAMAX) 70 MG tablet     amoxicillin-clavulanate (AUGMENTIN) 875-125 MG tablet     CALCIUM PO     cephALEXin (KEFLEX) 500 MG capsule     cholecalciferol (VITAMIN D) 1000 UNIT tablet     letrozole (FEMARA) 2.5 MG tablet     ospemifene (OSPHENA) 60 MG tablet     tolterodine (DETROL) 1 MG tablet     vitamin  B complex with vitamin C (VITAMIN  B COMPLEX) TABS     YUVAFEM 10 MCG TABS vaginal tablet     zolpidem (AMBIEN) 5 MG tablet     No  "current facility-administered medications for this visit.      REVIEW OF SYSTEMS:      Pertinent questions are noted below.  Answers are \"NO\" unless otherwise noted:    1.  - Do you have a history of heart attack, stroke, stent, bypass or surgery on an artery in the head, neck, heart or legs?  2.  - Do you ever have any pain or discomfort in your chest?   3. - Do you have a history of  Heart Failure?  4. - Are you troubled by shortness of breath when: walking on the level, up a slight hill or at night?  5. - Do you currently have a cold, bronchitis or other respiratory infection?  6. - Do you have a cough, shortness of breath or wheezing?  7. - Do you sometimes get pains in the calves of your legs when you walk?  8. - Do you or anyone in your family have previous history of blood clots?  9. - Do you or does anyone in your family have a serious bleeding problem such as prolonged bleeding following surgeries or cuts?  10. - Have you ever had problems with anemia or been told to take iron pills?  11. - Have you had any abnormal blood loss such as black, tarry or bloody stools, or abnormal vaginal bleeding?  12. - Have you ever had a blood transfusion?  13. - Have you or any of your relatives ever had problems with anesthesia?  14. - Do you have sleep apnea, excessive snoring or daytime drowsiness?  15. - Do you have any prosthetic heart valves?  16. - Do you have prosthetic joints?  17. - Is there any chance that you may be pregnant?     EXAM:   /85   Pulse 79   Wt 73.9 kg (163 lb)   SpO2 97%   BMI 26.71 kg/m      Physical Exam  Constitutional:       Appearance: Normal appearance.   HENT:      Head: Normocephalic and atraumatic.   Eyes:      Pupils: Pupils are equal, round, and reactive to light.   Neck:      Musculoskeletal: Neck supple.   Cardiovascular:      Rate and Rhythm: Normal rate and regular rhythm.      Pulses: Normal pulses.      Heart sounds: Normal heart sounds, S1 normal and S2 normal. "   Pulmonary:      Effort: Pulmonary effort is normal.      Breath sounds: Normal breath sounds and air entry.   Abdominal:      General: Abdomen is flat. Bowel sounds are normal.      Palpations: Abdomen is soft.   Skin:     General: Skin is warm and dry.      Capillary Refill: Capillary refill takes less than 2 seconds.   Neurological:      Mental Status: She is alert.   Psychiatric:         Mood and Affect: Mood normal.         Behavior: Behavior normal.         DIAGNOSTICS:     -ECG showed sinus rhythm without any ST segment abnormalities, no Q waves, no QT prolongation no T wave abnormalities.   I reviewed the EKG and concur with the resident physician's interpretation.  Dr. Ballesteros    Component      Latest Ref Rng & Units 12/10/2019   Sodium      133 - 144 mmol/L 137   Potassium      3.4 - 5.3 mmol/L 4.0   Chloride      94 - 109 mmol/L 104   Carbon Dioxide      20 - 32 mmol/L 32   Anion Gap      3 - 14 mmol/L 2 (L)   Glucose      70 - 99 mg/dL 105 (H)   Urea Nitrogen      7 - 30 mg/dL 14   Creatinine      0.52 - 1.04 mg/dL 0.72   GFR Estimate      >60 mL/min/1.73:m2 88   GFR Estimate If Black      >60 mL/min/1.73:m2 >90   Calcium      8.5 - 10.1 mg/dL 9.6   WBC      4.0 - 11.0 10e9/L 5.3   RBC Count      3.8 - 5.2 10e12/L 4.13   Hemoglobin      11.7 - 15.7 g/dL 13.3   Hematocrit      35.0 - 47.0 % 40.7   MCV      78 - 100 fl 99   MCH      26.5 - 33.0 pg 32.2   MCHC      31.5 - 36.5 g/dL 32.7   RDW      10.0 - 15.0 % 12.6   Platelet Count      150 - 450 10e9/L 214   Magnesium      1.6 - 2.3 mg/dL 2.3       IMPRESSION:       The proposed surgical procedure is considered to be low risk.  Patient has a low cardiovascular and a pulmonary risk profile      The patient has no additional risks for perioperative complications    Giving her most recent anemia, hypokalemia and hypomagnesemia; would get any ECG and repeat a CBC and a BMP with mag.        RECOMMENDATIONS:     May proceed with proposed procedure, without  any other diagnositic tests and/or specialist consultation(s)      Instructions for home medications patsy-operatively discussed with patient verbally and in writing       Patient encouraged to [place] with [place] [facility] [place]      Alphonso Langston M.D.  Internal Medicine-PGY-1  [facility]   pager [phone]    Teaching Physician Note:  I was present during the visit and the patient was seen and examined by me.   I discussed the case with the resident and agree with the note as documented by the resident with the following exceptions:  None.    Adilene Ballesteros M.D.  Internal Medicine   pager [phone]

## 2019-12-11 ENCOUNTER — TELEPHONE (OUTPATIENT)
Dept: OBGYN | Facility: CLINIC | Age: 64
End: 2019-12-11

## 2019-12-11 LAB — INTERPRETATION ECG - MUSE: NORMAL

## 2019-12-11 NOTE — TELEPHONE ENCOUNTER
Prior Authorization Retail Medication Request    Medication/Dose: ospemifene (OSPHENA) 60 MG tablet  ICD code (if different than what is on RX):    Previously Tried and Failed:   Rationale:  History of breast cancer    Office Note 11/21/19  Vaginal atrophy-she is good to talk with her oncologist to see if Osphena is appropriate for her.    MyChart Medical Advice 12/5/19  I heard from my oncologist that it is ok to try Osphena.     Insurance Name:  [facility] OUT OF STATE  Insurance ID:  [id number]

## 2019-12-12 NOTE — TELEPHONE ENCOUNTER
Prior Authorization Approval    Authorization Effective Date: 12/12/2019  Authorization Expiration Date: 12/11/2020  Medication: ospemifene (OSPHENA) 60 MG tablet-APPROVED  Approved Dose/Quantity:   Reference #:     Insurance Company: Other (see comments)Comment:  [employer] of [phone]  Expected CoPay:       CoPay Card Available:      Foundation Assistance Needed:    Which Pharmacy is filling the prescription (Not needed for infusion/clinic administered): [facility] #[id number] - [place], [place] - [address]  Pharmacy Notified: Yes  Patient Notified: No    Answered a question incorrectly, called and spoke with Graciela at [place].  Answered question correctly and got a verbal approval.  Pharmacy will notify patient when medication is ready.

## 2019-12-13 PROBLEM — M54.41 RIGHT-SIDED LOW BACK PAIN WITH RIGHT-SIDED SCIATICA: Status: RESOLVED | Noted: 2019-04-02 | Resolved: 2019-06-27

## 2019-12-13 PROBLEM — M53.3 SACROILIAC JOINT PAIN: Status: RESOLVED | Noted: 2019-04-02 | Resolved: 2019-06-27

## 2019-12-18 ENCOUNTER — HOSPITAL ENCOUNTER (OUTPATIENT)
Facility: CLINIC | Age: 64
Discharge: HOME | End: 2019-12-18
Attending: OBSTETRICS & GYNECOLOGY | Admitting: OBSTETRICS & GYNECOLOGY
Payer: COMMERCIAL

## 2019-12-18 ENCOUNTER — ANESTHESIA (OUTPATIENT)
Dept: SURGERY | Facility: CLINIC | Age: 64
End: 2019-12-18
Payer: COMMERCIAL

## 2019-12-18 ENCOUNTER — ANESTHESIA EVENT (OUTPATIENT)
Dept: SURGERY | Facility: CLINIC | Age: 64
End: 2019-12-18
Payer: COMMERCIAL

## 2019-12-18 VITALS
BODY MASS INDEX: 26.28 KG/M2 | TEMPERATURE: 96.6 F | HEIGHT: 66 IN | HEART RATE: 65 BPM | SYSTOLIC BLOOD PRESSURE: 149 MMHG | DIASTOLIC BLOOD PRESSURE: 80 MMHG | OXYGEN SATURATION: 97 % | RESPIRATION RATE: 16 BRPM | WEIGHT: 163.5 LBS

## 2019-12-18 DIAGNOSIS — N84.0 ENDOMETRIAL POLYP: ICD-10-CM

## 2019-12-18 DIAGNOSIS — G89.18 ACUTE POST-OPERATIVE PAIN: Primary | ICD-10-CM

## 2019-12-18 PROCEDURE — 58558 HYSTEROSCOPY BIOPSY: CPT | Performed by: OBSTETRICS & GYNECOLOGY

## 2019-12-18 PROCEDURE — 25800025 ZZH RX 258: Performed by: OBSTETRICS & GYNECOLOGY

## 2019-12-18 PROCEDURE — 27210794 ZZH OR GENERAL SUPPLY STERILE: Performed by: OBSTETRICS & GYNECOLOGY

## 2019-12-18 PROCEDURE — 40000170 ZZH STATISTIC PRE-PROCEDURE ASSESSMENT II: Performed by: OBSTETRICS & GYNECOLOGY

## 2019-12-18 PROCEDURE — 25800030 ZZH RX IP 258 OP 636: Performed by: NURSE ANESTHETIST, CERTIFIED REGISTERED

## 2019-12-18 PROCEDURE — 71000012 ZZH RECOVERY PHASE 1 LEVEL 1 FIRST HR: Performed by: OBSTETRICS & GYNECOLOGY

## 2019-12-18 PROCEDURE — 25000125 ZZHC RX 250: Performed by: NURSE ANESTHETIST, CERTIFIED REGISTERED

## 2019-12-18 PROCEDURE — 25000128 H RX IP 250 OP 636: Performed by: NURSE ANESTHETIST, CERTIFIED REGISTERED

## 2019-12-18 PROCEDURE — 25000566 ZZH SEVOFLURANE, EA 15 MIN: Performed by: OBSTETRICS & GYNECOLOGY

## 2019-12-18 PROCEDURE — 37000008 ZZH ANESTHESIA TECHNICAL FEE, 1ST 30 MIN: Performed by: OBSTETRICS & GYNECOLOGY

## 2019-12-18 PROCEDURE — 88305 TISSUE EXAM BY PATHOLOGIST: CPT | Performed by: OBSTETRICS & GYNECOLOGY

## 2019-12-18 PROCEDURE — 88305 TISSUE EXAM BY PATHOLOGIST: CPT | Mod: 26 | Performed by: OBSTETRICS & GYNECOLOGY

## 2019-12-18 PROCEDURE — 37000009 ZZH ANESTHESIA TECHNICAL FEE, EACH ADDTL 15 MIN: Performed by: OBSTETRICS & GYNECOLOGY

## 2019-12-18 PROCEDURE — 71000027 ZZH RECOVERY PHASE 2 EACH 15 MINS: Performed by: OBSTETRICS & GYNECOLOGY

## 2019-12-18 PROCEDURE — 25000132 ZZH RX MED GY IP 250 OP 250 PS 637: Performed by: OBSTETRICS & GYNECOLOGY

## 2019-12-18 PROCEDURE — 36000056 ZZH SURGERY LEVEL 3 1ST 30 MIN: Performed by: OBSTETRICS & GYNECOLOGY

## 2019-12-18 RX ORDER — SODIUM CHLORIDE, SODIUM LACTATE, POTASSIUM CHLORIDE, CALCIUM CHLORIDE 600; 310; 30; 20 MG/100ML; MG/100ML; MG/100ML; MG/100ML
INJECTION, SOLUTION INTRAVENOUS CONTINUOUS PRN
Status: DISCONTINUED | OUTPATIENT
Start: 2019-12-18 | End: 2019-12-18

## 2019-12-18 RX ORDER — PROPOFOL 10 MG/ML
INJECTION, EMULSION INTRAVENOUS CONTINUOUS PRN
Status: DISCONTINUED | OUTPATIENT
Start: 2019-12-18 | End: 2019-12-18

## 2019-12-18 RX ORDER — KETOROLAC TROMETHAMINE 30 MG/ML
INJECTION, SOLUTION INTRAMUSCULAR; INTRAVENOUS PRN
Status: DISCONTINUED | OUTPATIENT
Start: 2019-12-18 | End: 2019-12-18

## 2019-12-18 RX ORDER — IBUPROFEN 600 MG/1
600 TABLET, FILM COATED ORAL
Status: DISCONTINUED | OUTPATIENT
Start: 2019-12-18 | End: 2019-12-18 | Stop reason: HOSPADM

## 2019-12-18 RX ORDER — ACETAMINOPHEN 325 MG/1
975 TABLET ORAL ONCE
Status: COMPLETED | OUTPATIENT
Start: 2019-12-18 | End: 2019-12-18

## 2019-12-18 RX ORDER — MEPERIDINE HYDROCHLORIDE 25 MG/ML
12.5 INJECTION INTRAMUSCULAR; INTRAVENOUS; SUBCUTANEOUS
Status: DISCONTINUED | OUTPATIENT
Start: 2019-12-18 | End: 2019-12-18 | Stop reason: HOSPADM

## 2019-12-18 RX ORDER — FENTANYL CITRATE 0.05 MG/ML
25-50 INJECTION, SOLUTION INTRAMUSCULAR; INTRAVENOUS
Status: DISCONTINUED | OUTPATIENT
Start: 2019-12-18 | End: 2019-12-18 | Stop reason: HOSPADM

## 2019-12-18 RX ORDER — NALOXONE HYDROCHLORIDE 0.4 MG/ML
.1-.4 INJECTION, SOLUTION INTRAMUSCULAR; INTRAVENOUS; SUBCUTANEOUS
Status: DISCONTINUED | OUTPATIENT
Start: 2019-12-18 | End: 2019-12-18 | Stop reason: HOSPADM

## 2019-12-18 RX ORDER — PROPOFOL 10 MG/ML
INJECTION, EMULSION INTRAVENOUS PRN
Status: DISCONTINUED | OUTPATIENT
Start: 2019-12-18 | End: 2019-12-18

## 2019-12-18 RX ORDER — ONDANSETRON 2 MG/ML
4 INJECTION INTRAMUSCULAR; INTRAVENOUS EVERY 30 MIN PRN
Status: DISCONTINUED | OUTPATIENT
Start: 2019-12-18 | End: 2019-12-18 | Stop reason: HOSPADM

## 2019-12-18 RX ORDER — OXYCODONE HYDROCHLORIDE 5 MG/1
5 TABLET ORAL
Status: COMPLETED | OUTPATIENT
Start: 2019-12-18 | End: 2019-12-18

## 2019-12-18 RX ORDER — SODIUM CHLORIDE, SODIUM LACTATE, POTASSIUM CHLORIDE, CALCIUM CHLORIDE 600; 310; 30; 20 MG/100ML; MG/100ML; MG/100ML; MG/100ML
INJECTION, SOLUTION INTRAVENOUS CONTINUOUS
Status: DISCONTINUED | OUTPATIENT
Start: 2019-12-18 | End: 2019-12-18 | Stop reason: HOSPADM

## 2019-12-18 RX ORDER — HYDROMORPHONE HYDROCHLORIDE 1 MG/ML
.3-.5 INJECTION, SOLUTION INTRAMUSCULAR; INTRAVENOUS; SUBCUTANEOUS EVERY 10 MIN PRN
Status: DISCONTINUED | OUTPATIENT
Start: 2019-12-18 | End: 2019-12-18 | Stop reason: HOSPADM

## 2019-12-18 RX ORDER — IBUPROFEN 800 MG/1
800 TABLET, FILM COATED ORAL EVERY 8 HOURS PRN
Qty: 30 TABLET | Refills: 0 | Status: SHIPPED | OUTPATIENT
Start: 2019-12-18 | End: 2020-07-17

## 2019-12-18 RX ORDER — ONDANSETRON 2 MG/ML
INJECTION INTRAMUSCULAR; INTRAVENOUS PRN
Status: DISCONTINUED | OUTPATIENT
Start: 2019-12-18 | End: 2019-12-18

## 2019-12-18 RX ORDER — DEXAMETHASONE SODIUM PHOSPHATE 4 MG/ML
INJECTION, SOLUTION INTRA-ARTICULAR; INTRALESIONAL; INTRAMUSCULAR; INTRAVENOUS; SOFT TISSUE PRN
Status: DISCONTINUED | OUTPATIENT
Start: 2019-12-18 | End: 2019-12-18

## 2019-12-18 RX ORDER — FENTANYL CITRATE 50 UG/ML
INJECTION, SOLUTION INTRAMUSCULAR; INTRAVENOUS PRN
Status: DISCONTINUED | OUTPATIENT
Start: 2019-12-18 | End: 2019-12-18

## 2019-12-18 RX ORDER — ONDANSETRON 4 MG/1
4 TABLET, ORALLY DISINTEGRATING ORAL EVERY 30 MIN PRN
Status: DISCONTINUED | OUTPATIENT
Start: 2019-12-18 | End: 2019-12-18 | Stop reason: HOSPADM

## 2019-12-18 RX ORDER — OXYCODONE HYDROCHLORIDE 5 MG/1
5-10 TABLET ORAL EVERY 4 HOURS PRN
Qty: 6 TABLET | Refills: 0 | Status: SHIPPED | OUTPATIENT
Start: 2019-12-18 | End: 2019-12-31

## 2019-12-18 RX ORDER — LIDOCAINE HYDROCHLORIDE 20 MG/ML
INJECTION, SOLUTION INFILTRATION; PERINEURAL PRN
Status: DISCONTINUED | OUTPATIENT
Start: 2019-12-18 | End: 2019-12-18

## 2019-12-18 RX ADMIN — PROPOFOL 25 MCG/KG/MIN: 10 INJECTION, EMULSION INTRAVENOUS at 08:33

## 2019-12-18 RX ADMIN — SODIUM CHLORIDE, POTASSIUM CHLORIDE, SODIUM LACTATE AND CALCIUM CHLORIDE: 600; 310; 30; 20 INJECTION, SOLUTION INTRAVENOUS at 08:26

## 2019-12-18 RX ADMIN — KETOROLAC TROMETHAMINE 30 MG: 30 INJECTION, SOLUTION INTRAMUSCULAR at 08:48

## 2019-12-18 RX ADMIN — OXYCODONE HYDROCHLORIDE 5 MG: 5 TABLET ORAL at 09:33

## 2019-12-18 RX ADMIN — DEXAMETHASONE SODIUM PHOSPHATE 4 MG: 4 INJECTION, SOLUTION INTRA-ARTICULAR; INTRALESIONAL; INTRAMUSCULAR; INTRAVENOUS; SOFT TISSUE at 08:40

## 2019-12-18 RX ADMIN — LIDOCAINE HYDROCHLORIDE 100 MG: 20 INJECTION, SOLUTION INFILTRATION; PERINEURAL at 08:31

## 2019-12-18 RX ADMIN — MIDAZOLAM 2 MG: 1 INJECTION INTRAMUSCULAR; INTRAVENOUS at 08:27

## 2019-12-18 RX ADMIN — ACETAMINOPHEN 975 MG: 325 TABLET, FILM COATED ORAL at 06:54

## 2019-12-18 RX ADMIN — FENTANYL CITRATE 50 MCG: 50 INJECTION, SOLUTION INTRAMUSCULAR; INTRAVENOUS at 08:31

## 2019-12-18 RX ADMIN — PROPOFOL 200 MG: 10 INJECTION, EMULSION INTRAVENOUS at 08:31

## 2019-12-18 RX ADMIN — ONDANSETRON 4 MG: 2 INJECTION INTRAMUSCULAR; INTRAVENOUS at 08:40

## 2019-12-18 RX ADMIN — FENTANYL CITRATE 50 MCG: 50 INJECTION, SOLUTION INTRAMUSCULAR; INTRAVENOUS at 08:52

## 2019-12-18 ASSESSMENT — MIFFLIN-ST. JEOR: SCORE: 1313.38

## 2019-12-18 NOTE — ANESTHESIA PREPROCEDURE EVALUATION
Anesthesia Pre-Procedure Evaluation    Patient: Namita GÓMEZ [facility]   MRN: [id number] : 1955          Preoperative Diagnosis: Endometrial polyp [N84.0]    Procedure(s):  HYSTEROSCOPY DILATION AND CURRETAGE WITH MYOSURE    Past Medical History:   Diagnosis Date     Breast cancer (H) 2015     Colon polyps      Hypovitaminosis D      Kidney stone      Multiple thyroid nodules     right dominant 1.3 cm; benign cytology 5/15     Osteoporosis 7/15    lowest T-score -3.2 33% radius     Pancreatic divisum      Recurrent UTI     since menopause (age 47) 3 x per year     Past Surgical History:   Procedure Laterality Date     EXCISION BENIGN LESION COMPL Right     Abdominal lipoma removed      EYE SURGERY      strabismus     LUMPECTOMY BREAST WITH SENTINEL NODE, COMBINED Right 2015    Procedure: COMBINED LUMPECTOMY BREAST WITH SENTINEL NODE;  Surgeon: Yoshi Oconnor MD;  Location: [facility] OR       Anesthesia Evaluation     .             ROS/MED HX    ENT/Pulmonary:  - neg pulmonary ROS     Neurologic:  - neg neurologic ROS     Cardiovascular:  - neg cardiovascular ROS       METS/Exercise Tolerance:     Hematologic:  - neg hematologic  ROS       Musculoskeletal:  - neg musculoskeletal ROS       GI/Hepatic:     (+) Other GI/Hepatic pancreatic divism      Renal/Genitourinary:     (+) chronic renal disease, Nephrolithiasis , Pt does not require dialysis,       Endo:     (+) thyroid problem .      Psychiatric:  - neg psychiatric ROS       Infectious Disease:  - neg infectious disease ROS       Malignancy:         Other:                          Physical Exam  Normal systems: cardiovascular, pulmonary and dental    Airway   Mallampati: II  TM distance: >3 FB  Neck ROM: full    Dental     Cardiovascular       Pulmonary             Lab Results   Component Value Date    WBC 5.3 12/10/2019    HGB 13.3 12/10/2019    HCT 40.7 12/10/2019     12/10/2019     12/10/2019    POTASSIUM 4.0 12/10/2019    CHLORIDE  "104 12/10/2019    CO2 32 12/10/2019    BUN 14 12/10/2019    CR 0.72 12/10/2019     (H) 12/10/2019    PHIL 9.6 12/10/2019    PHOS 3.3 08/05/2016    MAG 2.3 12/10/2019    ALBUMIN 4.0 11/10/2017    PROTTOTAL 7.1 11/10/2017    ALT 28 11/10/2017    AST 18 11/10/2017    ALKPHOS 66 11/10/2017    BILITOTAL 0.7 11/10/2017    TSH 3.76 11/07/2019       Preop Vitals  BP Readings from Last 3 Encounters:   12/18/19 138/79   12/10/19 123/85   11/26/19 118/76    Pulse Readings from Last 3 Encounters:   12/18/19 78   12/10/19 79   11/26/19 68      Resp Readings from Last 3 Encounters:   12/18/19 18   07/29/19 16   03/19/19 18    SpO2 Readings from Last 3 Encounters:   12/18/19 96%   12/10/19 97%   10/21/19 96%      Temp Readings from Last 1 Encounters:   12/18/19 35.6  C (96.1  F) (Temporal)    Ht Readings from Last 1 Encounters:   12/18/19 1.676 m (5' 6\")      Wt Readings from Last 1 Encounters:   12/18/19 74.2 kg (163 lb 8 oz)    Estimated body mass index is 26.39 kg/m  as calculated from the following:    Height as of this encounter: 1.676 m (5' 6\").    Weight as of this encounter: 74.2 kg (163 lb 8 oz).       Anesthesia Plan      History & Physical Review  History and physical reviewed and following examination; no interval change.    ASA Status:  2 .    NPO Status:  > 8 hours    Plan for General and LMA with Intravenous induction. Maintenance will be Balanced.    PONV prophylaxis:  Ondansetron (or other 5HT-3) and Dexamethasone or Solumedrol  Background propofol gtt      Postoperative Care      Consents  Anesthetic plan, risks, benefits and alternatives discussed with:  Patient..                 Ion Coombs, DO, DO  "

## 2019-12-18 NOTE — OP NOTE
Namita GÓMEZ [facility]  1955 12/18/2019      Preop Dx: 1. Endometrial polyps, history of breast cancer on tamoxifen  2. Cramping    Postop Dx:  Same                         Procedure:  Dilatation, Curettage, Hysteroscopy, Myosure    Anesthesia:  General    Surgeon:  Sushila Call MD    Procedure:  The patient was brought to the operating room where following general anesthesia was placed in the dorsolithotomy position where she was prepped and draped.      A speculum was placed.  The cervix was grasped with a tenaculum.   The cervix was dilated to accommodate the hysteroscope.  Under direct visualization the hysteroscope was placed into the endometrial cavity with the findings of small polyp on posterior wall.  Myosure introduced and poly removed.    The endometrial cavity was curetted. The specimens were submitted to pathology for microscopic analysis.    The instruments were removed from the vagina and all areas were hemostatic.  The estimated blood loss was 10cc.  All sponge, needle, and instrument counts were correct.  The patient was awakened and removed to the recovery room in stable condition.    Sushila Call MD  December 18, 2019

## 2019-12-18 NOTE — ANESTHESIA POSTPROCEDURE EVALUATION
Patient: Namita Hernandez    Procedure(s):  HYSTEROSCOPY DILATION AND CURRETAGE WITH MYOSURE    Diagnosis:Endometrial polyp [N84.0]  Diagnosis Additional Information: No value filed.    Anesthesia Type:  General, LMA    Note:  Anesthesia Post Evaluation    Patient location during evaluation: bedside  Patient participation: Able to fully participate in evaluation  Level of consciousness: awake  Pain management: adequate  Airway patency: patent  Cardiovascular status: acceptable  Respiratory status: acceptable  Hydration status: acceptable  PONV: none     Anesthetic complications: None    Comments: No anesthetic complications noted.         Last vitals:  Vitals:    12/18/19 0900 12/18/19 0915 12/18/19 1020   BP: (!) 149/90 (!) 144/79 (!) 149/80   Pulse: 74 65    Resp: 13 11 16   Temp: 35.7  C (96.3  F) 35.9  C (96.6  F)    SpO2: 100% 96% 97%         Electronically Signed By: Ion Coombs DO, DO  December 18, 2019  12:41 PM

## 2019-12-18 NOTE — DISCHARGE INSTRUCTIONS
Today you were given 975 mg of Tylenol at 6:55am. The recommended daily maximum dose is 4000 mg.     Today you received Toradol, an antiinflammatory medication similar to Ibuprofen.  You should not take other antiinflammatory medication, such as Ibuprofen, Motrin, Advil, Aleve, Naprosyn, etc until 3pm.      Information for Patients Discharging with a Transderm Scopolamine Patch       Dry mouth is a common side effect.    Drowsiness is another common side effect especially when combined with pain medication.  Please avoid activities that require mental alertness such as driving a car or making important legal decisions.    Since Scopolamine can cause temporary dilation of the pupils and blurred vision if it comes in contact with the eyes; be sure to wash your hands thoroughly with soap and water immediately after handling the patch.   When you remove your patch, please stick it to a tissue or paper towel for disposal.      Remove the patch immediately and contact a physician in the unlikely event that you experience symptoms of acute glaucoma (pain and reddening of the eyes, accompanied by dilated pupils).    Remove the patch if you develop any difficulties urinating.  If you cannot urinate after removing your patch, please notify your surgeon.    Remove the patch 24 hours after surgery.      Same Day Surgery Discharge Instructions for  Sedation and General Anesthesia       It's not unusual to feel dizzy, light-headed or faint for up to 24 hours after surgery or while taking pain medication.  If you have these symptoms: sit for a few minutes before standing and have someone assist you when you get up to walk or use the bathroom.      You should rest and relax for the next 24 hours. We recommend you make arrangements to have an adult stay with you for at least 24 hours after your discharge.  Avoid hazardous and strenuous activity.      DO NOT DRIVE any vehicle or operate mechanical equipment for 24 hours following the  end of your surgery.  Even though you may feel normal, your reactions may be affected by the medication you have received.      Do not drink alcoholic beverages for 24 hours following surgery.       Slowly progress to your regular diet as you feel able. It's not unusual to feel nauseated and/or vomit after receiving anesthesia.  If you develop these symptoms, drink clear liquids (apple juice, ginger ale, broth, 7-up, etc. ) until you feel better.  If your nausea and vomiting persists for 24 hours, please notify your surgeon.        All narcotic pain medications, along with inactivity and anesthesia, can cause constipation. Drinking plenty of liquids and increasing fiber intake will help.      For any questions of a medical nature, call your surgeon.      Do not make important decisions for 24 hours.      If you had general anesthesia, you may have a sore throat for a couple of days related to the breathing tube used during surgery.  You may use Cepacol lozenges to help with this discomfort.  If it worsens or if you develop a fever, contact your surgeon.       If you feel your pain is not well managed with the pain medications prescribed by your surgeon, please contact your surgeon's office to let them know so they can address your concerns.           HOME CARE FOLLOWING HYSTEROSCOPY    Diet  You have no restrictions on your diet.  During the evening following surgery, drink plenty of fluids and eat a light supper.    Nausea  The anesthesia may produce some nausea.  If you feel nauseated, stay in bed and try drinking fluids such as 7-Up, tea, or soup.    Discomfort  The amount of discomfort you can expect is very unpredictable.  If you have pain that cannot be controlled with Tylenol or with the prescription you may have received, you should notify your physician.  Abdominal cramping or low backache is not uncommon and should not be a cause for concern.  You will be drowsy and weak the day of surgery and possibly the  following day.  Fever  A low grade fever (not over 100 F) is usual after this procedure.  Do not hesitate to notify your physician if your fever seems excessive or persists.    Activity   You may resume your normal activity.  Avoid heavy lifting for one week.  You may bathe or shower.  Do not douche, use tampons, or resume intercourse until the bleeding has ceased.    Emergency Care  Contact your physician if you have any of these problems:   1.  A fever over 100 F   2.  A large amount of bleeding or drainage   3.  Severe pain         **If you have questions or concerns about your procedure,   call Dr. Edwards at [phone]**

## 2019-12-19 LAB — COPATH REPORT: NORMAL

## 2019-12-23 ENCOUNTER — NURSE TRIAGE (OUTPATIENT)
Dept: NURSING | Facility: CLINIC | Age: 64
End: 2019-12-23

## 2019-12-24 ENCOUNTER — TELEPHONE (OUTPATIENT)
Dept: OBGYN | Facility: CLINIC | Age: 64
End: 2019-12-24

## 2019-12-24 DIAGNOSIS — B37.31 YEAST INFECTION OF THE VAGINA: Primary | ICD-10-CM

## 2019-12-24 RX ORDER — FLUCONAZOLE 150 MG/1
150 TABLET ORAL ONCE
Qty: 2 TABLET | Refills: 0 | Status: SHIPPED | OUTPATIENT
Start: 2019-12-24 | End: 2019-12-31

## 2019-12-24 NOTE — TELEPHONE ENCOUNTER
Called and informed pt of Dr De Jesus response and reviewed Rx instructions and f/u in clinic if sx's do not improve.  Pt verbalized understanding, in agreement with plan, and voiced no further questions.    Aracely Villatoro RN on 12/24/2019 at 10:07 AM

## 2019-12-24 NOTE — TELEPHONE ENCOUNTER
"12/18/19  S/P Dilatation, Curettage, Hysteroscopy, Myosure    Increase in bright red bleeding/spotting Sun/Mon, nothing today.  Vaginal itching and burning and can be \"painful\"  Using an ice pack to area which helps calm things a bit.  Denies fever, abdominal pain or any vaginal \"discharge\" just the spotting which can be normal at this point.  Continues with Ibu/Tyl alternating  Urgency to urinate like she had before procedure.    Has been on a lot of abx prior to procedure and questions a yeast infection but really not sure what to make of this if normal after procedure.    Will consult MD and call pt back.    Aracely Villatoro RN on 12/24/2019 at 8:51 AM    "

## 2019-12-24 NOTE — TELEPHONE ENCOUNTER
Hysteroscopy on Wed  Patient reports hysteroscopy recently.  Now she has some vaginal spotting, itchy, burning and frequency along with abdominal cramping  Patient says she does not have a fever.  Reviewed care advice with caller per RN triage protocol to be seen tomorrow.  FNA advised to call back with any concerns.   Caller verbalized understanding.        Reason for Disposition    MODERATE-SEVERE itching (i.e., interferes with school, work, or sleep)    Additional Information    Negative: Sounds like a life-threatening emergency to the triager    Negative: Followed a genital area injury    Negative: Foreign body in vagina (e.g., tampon)    Negative: Vaginal bleeding is main symptom    Negative: Vaginal discharge is main symptom    Negative: Pain or burning with urination is main symptom    Negative: Menstrual cramps is main symptom    Negative: Abdomen pain is main symptom    Negative: Pubic lice suspected    Negative: Itching or rash of external female genital area (vulva)    Negative: Patient sounds very sick or weak to the triager    Negative: [1] SEVERE pain AND [2] not improved 2 hours after pain medicine    Negative: [1] Genital area looks infected (e.g., draining sore, spreading redness) AND [2] fever    Negative: [1] Something is hanging out of the vagina AND [2] can't easily be pushed back inside    Protocols used: VAGINAL SYMPTOMS-A-AH

## 2019-12-24 NOTE — TELEPHONE ENCOUNTER
The itching and burning and irritation arent typical after HSC. Could be reacting to the prep possibly or could be yeast from the abx. Somewhat too delayed to be a prep reaction  Will try diflucan and if not better then needs to be seen

## 2019-12-26 ENCOUNTER — TELEPHONE (OUTPATIENT)
Dept: OBGYN | Facility: CLINIC | Age: 64
End: 2019-12-26

## 2019-12-26 DIAGNOSIS — R39.9 UTI SYMPTOMS: Primary | ICD-10-CM

## 2019-12-26 NOTE — TELEPHONE ENCOUNTER
Pt contacted. The pt says she had a hysterscopy done on the 18th. She continues to need pain medication  - ASA and Tylenol alternating- for the cervical burning and stinging sensation she is always having.   She continues to spot but it is light amts- red in color/ no clots.  She also reports having the sensation that she needs to pee all the time but feels she does empty her bladder.     Pt will be taking her 2nd dose of Diflucan dose today  that was recommended for her  on the 24th- for her vaginal itching.    I told the pt that it is good that her bleeding is tapering off to spotting. That should lessen as time goes on.      I will consult the dr regarding the cervical discomfort and her frequent need to void.     Routing to Dr Call to advise.  UA/UC? Appt?

## 2019-12-26 NOTE — TELEPHONE ENCOUNTER
Patient had hysteroscopy on 12/18/19. Still bleeding and having pain. Please call patient @ [phone]

## 2019-12-26 NOTE — TELEPHONE ENCOUNTER
Called and left a detailed vm informing pt of Dr Call's response. Instructed she can call a  clinic or our clinic to schedule a lab only visit to leave a urine specimen for analysis for UTI - orders in place.  May call with any further questions.  Aracely Villatoro RN on 12/26/2019 at 3:35 PM

## 2019-12-27 DIAGNOSIS — R39.9 UTI SYMPTOMS: ICD-10-CM

## 2019-12-27 DIAGNOSIS — R39.9 UTI SYMPTOMS: Primary | ICD-10-CM

## 2019-12-27 LAB
ALBUMIN UR-MCNC: NEGATIVE MG/DL
APPEARANCE UR: CLEAR
BILIRUB UR QL STRIP: NEGATIVE
COLOR UR AUTO: YELLOW
GLUCOSE UR STRIP-MCNC: NEGATIVE MG/DL
HGB UR QL STRIP: ABNORMAL
KETONES UR STRIP-MCNC: NEGATIVE MG/DL
LEUKOCYTE ESTERASE UR QL STRIP: ABNORMAL
NITRATE UR QL: NEGATIVE
PH UR STRIP: 5.5 PH (ref 5–7)
RBC #/AREA URNS AUTO: ABNORMAL /HPF
SOURCE: ABNORMAL
SP GR UR STRIP: <=1.005 (ref 1–1.03)
UROBILINOGEN UR STRIP-ACNC: 0.2 EU/DL (ref 0.2–1)
WBC #/AREA URNS AUTO: ABNORMAL /HPF

## 2019-12-27 PROCEDURE — 87186 SC STD MICRODIL/AGAR DIL: CPT | Performed by: OBSTETRICS & GYNECOLOGY

## 2019-12-27 PROCEDURE — 87088 URINE BACTERIA CULTURE: CPT | Performed by: OBSTETRICS & GYNECOLOGY

## 2019-12-27 PROCEDURE — 81001 URINALYSIS AUTO W/SCOPE: CPT | Performed by: OBSTETRICS & GYNECOLOGY

## 2019-12-27 PROCEDURE — 87086 URINE CULTURE/COLONY COUNT: CPT | Performed by: OBSTETRICS & GYNECOLOGY

## 2019-12-27 RX ORDER — CEPHALEXIN 500 MG/1
500 CAPSULE ORAL 2 TIMES DAILY
Qty: 14 CAPSULE | Refills: 0 | Status: SHIPPED | OUTPATIENT
Start: 2019-12-27 | End: 2020-01-30

## 2019-12-29 LAB
BACTERIA SPEC CULT: ABNORMAL
Lab: ABNORMAL
SPECIMEN SOURCE: ABNORMAL

## 2019-12-30 ENCOUNTER — TELEPHONE (OUTPATIENT)
Dept: OBGYN | Facility: CLINIC | Age: 64
End: 2019-12-30

## 2019-12-30 NOTE — TELEPHONE ENCOUNTER
Pt calling in with concerns that she is not feeling better since she has started the bx treatment for her UTI post hysteroscopy.  She says her pelvic pain increases as the day goes on. By evening she has a burning sensation where she feels is her cervix. She has been taking her abx. Using tylenol and Ibuprofen-  Alternating on a regular basis. Questioning if there is anything else for pain that could be recommended.    Will consult Dr Freed On-call.  Will see if we can get her in sooner than Firday for an appt w/ Dr Call.    Discussed w/ Dr Freed- Heating pad- Does not want to prescribe anything vaginally to could possible alter any findngs at her appt scheduled for tomorrow.    Reviewed rec w/ patient-   Informed of earlier appt set up for tomorrow at 2 pm.  She is in agreement to continue her home treatment and add in use of heating pad.  Reminded that she can always call the clinic again and talk w/ nurses if anything changes.  Pt verbalized understanding.

## 2019-12-30 NOTE — PROGRESS NOTES
SUBJECTIVE:                                                   Namita Hernandez is a 64 year old female who presents to clinic today for the following health issue(s):  Patient presents with:  Follow Up: follow up Hysteroscopy          HPI:  Patient feeling better from her UTI.  No vaginal disharge or bleeding.  She is taking the osphena now.    No LMP recorded. Patient is postmenopausal..     Patient is not sexually active, .  Using none for contraception.    reports that she quit smoking about 34 years ago. She smoked 0.00 packs per day. She has never used smokeless tobacco.    STD testing offered?  Declined    Health maintenance updated:  no    Today's PHQ-2 Score:   PHQ-2 (  [employer]) 2019   Q1: Little interest or pleasure in doing things 0   Q2: Feeling down, depressed or hopeless 0   PHQ-2 Score 0     Today's PHQ-9 Score:   PHQ-9 SCORE 2018   PHQ-9 Total Score 2     Today's JL-7 Score:   JL-7 SCORE 2018   Total Score 0       Problem list and histories reviewed & adjusted, as indicated.  Additional history: as documented.    Patient Active Problem List   Diagnosis     Breast cancer, right breast (H)     Multiple thyroid nodules     Cervical adenopathy     Elbow pain, left     Right shoulder pain     Hypovitaminosis D     Senile osteoporosis     Parathyroid abnormality (H)     History of colonic polyps     Endometrial polyp     Past Surgical History:   Procedure Laterality Date     DILATION AND CURETTAGE, OPERATIVE HYSTEROSCOPY WITH MORCELLATOR, COMBINED N/A 2019    Procedure: HYSTEROSCOPY DILATION AND CURRETAGE WITH MYOSURE;  Surgeon: Sushila Melendez MD;  Location: [facility] OR     EXCISION BENIGN LESION COMPL Right     Abdominal lipoma removed      EYE SURGERY  1960    strabismus     LUMPECTOMY BREAST WITH SENTINEL NODE, COMBINED Right 2015    Procedure: COMBINED LUMPECTOMY BREAST WITH SENTINEL NODE;  Surgeon: Yoshi Oconnor MD;  Location:  OR      Social  History     Tobacco Use     Smoking status: Former Smoker     Packs/day: 0.00     Last attempt to quit: 1986     Years since quittin.0     Smokeless tobacco: Never Used   Substance Use Topics     Alcohol use: Not Currently      Problem (# of Occurrences) Relation (Name,Age of Onset)    Breast Cancer (2) Maternal Grandmother: late 50s, Mother: late 60s    Diabetes (1) Maternal Grandfather    Meniere's disease (1) Father    Thyroid Disease (1) Mother: estiven       Negative family history of: Thyroid Cancer, Glaucoma, Macular Degeneration, Retinal detachment, Melanoma            Current Outpatient Medications   Medication Sig     acetaminophen (TYLENOL) 500 MG tablet Take 1,000 mg by mouth     alendronate (FOSAMAX) 70 MG tablet Take 1 tablet (70 mg) by mouth every 7 days     CALCIUM PO Take 2-4 tablets by mouth daily      cephALEXin (KEFLEX) 500 MG capsule Take 1 capsule (500 mg) by mouth 2 times daily     cephALEXin (KEFLEX) 500 MG capsule Take 1 capsule (500 mg) by mouth 2 times daily for 7 days     cholecalciferol (VITAMIN D) 1000 UNIT tablet Take by mouth daily     ibuprofen (ADVIL/MOTRIN) 800 MG tablet Take 1 tablet (800 mg) by mouth every 8 hours as needed for moderate pain     letrozole (FEMARA) 2.5 MG tablet TAKE 1 TABLET BY MOUTH EVERY DAY     ospemifene (OSPHENA) 60 MG tablet Take 1 tablet (60 mg) by mouth every morning     tolterodine (DETROL) 1 MG tablet Take 1 tablet (1 mg) by mouth 2 times daily     vitamin  B complex with vitamin C (VITAMIN  B COMPLEX) TABS Take 1 tablet by mouth daily     zolpidem (AMBIEN) 5 MG tablet Take 1 tablet (5 mg) by mouth nightly as needed     No current facility-administered medications for this visit.      Allergies   Allergen Reactions     Sulfa Drugs Hives       ROS:  12 point review of systems negative other than symptoms noted below or in the HPI.  No urinary frequency or dysuria, bladder or kidney problems      OBJECTIVE:     /70   Pulse 70   Wt 73.9 kg  (163 lb)   BMI 26.31 kg/m    Body mass index is 26.31 kg/m .    Exam:  Constitutional:  Appearance: Well nourished, well developed alert, in no acute distress  Chest:  Respiratory Effort:  Breathing unlabored.   Cardiovascular: no edema  Skin: General Inspection:  No rashes present, no lesions present, no areas of discoloration.  Neurologic:  Mental Status:  Oriented X3.  Normal strength and tone, sensory exam grossly normal, mentation intact and speech normal.    Psychiatric:  Mentation appears normal and affect normal/bright.  Cervix: no lesions, no discharge  Vaginal atrophy noted    In-Clinic Test Results:  No results found for this or any previous visit (from the past 24 hour(s)).    ASSESSMENT/PLAN:                                                        ICD-10-CM    1. Frequent urinary tract infections N39.0 cephALEXin (KEFLEX) 500 MG capsule       There are no Patient Instructions on file for this visit.    1. Atrophy- plan on re-assessment in 3-4 weeks to make sure osphena is improving symptoms  2. UTI- resolving, sent script due to vacation coming up.  She gets frequent UTIs.    Sushila Call MD  [facility] [place]

## 2019-12-31 ENCOUNTER — OFFICE VISIT (OUTPATIENT)
Dept: OBGYN | Facility: CLINIC | Age: 64
End: 2019-12-31
Payer: COMMERCIAL

## 2019-12-31 VITALS
HEART RATE: 70 BPM | BODY MASS INDEX: 26.31 KG/M2 | SYSTOLIC BLOOD PRESSURE: 120 MMHG | WEIGHT: 163 LBS | DIASTOLIC BLOOD PRESSURE: 70 MMHG

## 2019-12-31 DIAGNOSIS — N39.0 FREQUENT URINARY TRACT INFECTIONS: Primary | ICD-10-CM

## 2019-12-31 PROCEDURE — 99212 OFFICE O/P EST SF 10 MIN: CPT | Performed by: OBSTETRICS & GYNECOLOGY

## 2019-12-31 RX ORDER — CEPHALEXIN 500 MG/1
500 CAPSULE ORAL 2 TIMES DAILY
Qty: 14 CAPSULE | Refills: 0 | Status: SHIPPED | OUTPATIENT
Start: 2019-12-31 | End: 2020-01-30

## 2020-01-10 ENCOUNTER — TELEPHONE (OUTPATIENT)
Dept: OBGYN | Facility: CLINIC | Age: 65
End: 2020-01-10

## 2020-01-10 DIAGNOSIS — R39.9 UTI SYMPTOMS: Primary | ICD-10-CM

## 2020-01-10 DIAGNOSIS — R39.9 UTI SYMPTOMS: ICD-10-CM

## 2020-01-10 LAB
ALBUMIN UR-MCNC: NEGATIVE MG/DL
APPEARANCE UR: CLEAR
BILIRUB UR QL STRIP: NEGATIVE
COLOR UR AUTO: YELLOW
GLUCOSE UR STRIP-MCNC: NEGATIVE MG/DL
HGB UR QL STRIP: NEGATIVE
KETONES UR STRIP-MCNC: NEGATIVE MG/DL
LEUKOCYTE ESTERASE UR QL STRIP: NEGATIVE
NITRATE UR QL: NEGATIVE
PH UR STRIP: 6 PH (ref 5–7)
SOURCE: NORMAL
SP GR UR STRIP: 1.01 (ref 1–1.03)
UROBILINOGEN UR STRIP-ACNC: 0.2 EU/DL (ref 0.2–1)

## 2020-01-10 PROCEDURE — 87086 URINE CULTURE/COLONY COUNT: CPT | Performed by: OBSTETRICS & GYNECOLOGY

## 2020-01-10 PROCEDURE — 81003 URINALYSIS AUTO W/O SCOPE: CPT | Performed by: OBSTETRICS & GYNECOLOGY

## 2020-01-10 NOTE — TELEPHONE ENCOUNTER
Off antibiotic a week but very twingy and still feeling that there is an infection. Would like to drop off urine sample.  Been on antibiotics for about 5 weeks now and is having diarrhea as well and not sure how good she been able to clean.    Info given:  -Drink plenty of water per day  -Oral probiotics daily  -Wear cotton underwear, no thongs. Avoid tight clothing.  -No underwear with tights.  -Shower soon after work outs or change out of sweaty clothing  -Do not wear underwear to bed, allow the environment to breathe  -Never douche or use vaginal   -Use white, unscented toilet paper. Avoid baby wipes. Wipe front to back.  Shower after intercourse or working out.    Order placed. Pt verbalized understanding, in agreement with plan, and voiced no further questions.  Annabella Nguyen RN on 1/10/2020 at 1:12 PM

## 2020-01-11 LAB
BACTERIA SPEC CULT: NORMAL
SPECIMEN SOURCE: NORMAL

## 2020-01-15 NOTE — PROGRESS NOTES
Patient did not return for further treatment and no additional progress was noted.  Please refer to the progress note and goal flowsheet completed on 06/27/19 for discharge information.

## 2020-01-29 NOTE — PROGRESS NOTES
SUBJECTIVE:                                                   Namita Hernandez is a 64 year old female who presents to clinic today for the following health issue(s):  Patient presents with:  Follow Up      HPI:  Patient reports that she is having some urinary leakage.  She states in the past has mostly leaking with exercise.  Yesterday felt like involuntary loss of urine.    Patient has had some urinary leakage for the last couple years.  She saw a physical therapist.  This has been more helpful.    She is also having some sciatica.      No LMP recorded. Patient is postmenopausal..     Patient is not sexually active, .  Using menopause for contraception.    reports that she quit smoking about 34 years ago. She smoked 0.00 packs per day. She has never used smokeless tobacco.    STD testing offered?  Declined    Health maintenance updated:  yes    Today's PHQ-2 Score:   PHQ-2 (  [employer]) 2019   Q1: Little interest or pleasure in doing things 0   Q2: Feeling down, depressed or hopeless 0   PHQ-2 Score 0     Today's PHQ-9 Score:   PHQ-9 SCORE 2018   PHQ-9 Total Score 2     Today's JL-7 Score:   JL-7 SCORE 2018   Total Score 0       Problem list and histories reviewed & adjusted, as indicated.  Additional history: as documented.    Patient Active Problem List   Diagnosis     Breast cancer, right breast (H)     Multiple thyroid nodules     Cervical adenopathy     Elbow pain, left     Right shoulder pain     Hypovitaminosis D     Senile osteoporosis     Parathyroid abnormality (H)     History of colonic polyps     Endometrial polyp     Past Surgical History:   Procedure Laterality Date     DILATION AND CURETTAGE, OPERATIVE HYSTEROSCOPY WITH MORCELLATOR, COMBINED N/A 2019    Procedure: HYSTEROSCOPY DILATION AND CURRETAGE WITH MYOSURE;  Surgeon: Sushila Melendez MD;  Location: [facility] OR     EXCISION BENIGN LESION COMPL Right     Abdominal lipoma removed      EYE SURGERY   1960    strabismus     LUMPECTOMY BREAST WITH SENTINEL NODE, COMBINED Right 2015    Procedure: COMBINED LUMPECTOMY BREAST WITH SENTINEL NODE;  Surgeon: Yoshi Oconnor MD;  Location:  OR      Social History     Tobacco Use     Smoking status: Former Smoker     Packs/day: 0.00     Last attempt to quit: 1986     Years since quittin.1     Smokeless tobacco: Never Used   Substance Use Topics     Alcohol use: Not Currently      Problem (# of Occurrences) Relation (Name,Age of Onset)    Breast Cancer (2) Maternal Grandmother: late 50s, Mother: late 60s    Diabetes (1) Maternal Grandfather    Meniere's disease (1) Father    Thyroid Disease (1) Mother: nodlue       Negative family history of: Thyroid Cancer, Glaucoma, Macular Degeneration, Retinal detachment, Melanoma            Current Outpatient Medications   Medication Sig     acetaminophen (TYLENOL) 500 MG tablet Take 1,000 mg by mouth     alendronate (FOSAMAX) 70 MG tablet Take 1 tablet (70 mg) by mouth every 7 days     CALCIUM PO Take 2-4 tablets by mouth daily      cholecalciferol (VITAMIN D) 1000 UNIT tablet Take by mouth daily     ibuprofen (ADVIL/MOTRIN) 800 MG tablet Take 1 tablet (800 mg) by mouth every 8 hours as needed for moderate pain     letrozole (FEMARA) 2.5 MG tablet TAKE 1 TABLET BY MOUTH EVERY DAY     ospemifene (OSPHENA) 60 MG tablet Take 1 tablet (60 mg) by mouth every morning     tolterodine (DETROL) 1 MG tablet Take 1 tablet (1 mg) by mouth 2 times daily     vitamin  B complex with vitamin C (VITAMIN  B COMPLEX) TABS Take 1 tablet by mouth daily     zolpidem (AMBIEN) 5 MG tablet Take 1 tablet (5 mg) by mouth nightly as needed     zolpidem (AMBIEN) 5 MG tablet Take 1 tablet (5 mg) by mouth nightly as needed     zolpidem (AMBIEN) 5 MG tablet Take 1 tablet (5 mg) by mouth nightly as needed     No current facility-administered medications for this visit.      Allergies   Allergen Reactions     Sulfa Drugs Hives       ROS:  12 point  "review of systems negative other than symptoms noted below or in the HPI.        OBJECTIVE:     /70   Ht 1.676 m (5' 6\")   Wt 75.1 kg (165 lb 9.6 oz)   Breastfeeding No   BMI 26.73 kg/m    Body mass index is 26.73 kg/m .    Exam:  Constitutional:  Appearance: Well nourished, well developed alert, in no acute distress  Chest:  Respiratory Effort:  Breathing unlabored.    Cardiovascular: no edema  Skin: General Inspection:  No rashes present, no lesions present, no areas of discoloration.  Neurologic:  Mental Status:  Oriented X3.  Normal strength and tone, sensory exam grossly normal, mentation intact and speech normal.    Psychiatric:  Mentation appears normal and affect normal/bright.     In-Clinic Test Results:  No results found for this or any previous visit (from the past 24 hour(s)).    ASSESSMENT/PLAN:                                                        ICD-10-CM    1. Urge incontinence of urine N39.41 Urine Culture Aerobic Bacterial     UA with Microscopic reflex to Culture     PHYSICAL THERAPY REFERRAL   2. Vaginal atrophy N95.2        There are no Patient Instructions on file for this visit.    1. Incontinence- PT referral, urine culture today due to change.  If negative culture and continues continue medication for urge incontinence.    2. Atrophy- continue the osphena.  She has only been on osphena for the frequent UTIs.      25 minutes was spent face to face with the patient today discussing her history, diagnosis, and follow-up plan as noted above. Over 50% of the visit was spent in counseling and coordination of care.          Sushila Call MD  [facility] [place]  "

## 2020-01-30 ENCOUNTER — ONCOLOGY VISIT (OUTPATIENT)
Dept: ONCOLOGY | Facility: CLINIC | Age: 65
End: 2020-01-30
Attending: INTERNAL MEDICINE
Payer: COMMERCIAL

## 2020-01-30 ENCOUNTER — OFFICE VISIT (OUTPATIENT)
Dept: OBGYN | Facility: CLINIC | Age: 65
End: 2020-01-30
Payer: COMMERCIAL

## 2020-01-30 VITALS
BODY MASS INDEX: 26.82 KG/M2 | HEART RATE: 80 BPM | DIASTOLIC BLOOD PRESSURE: 88 MMHG | SYSTOLIC BLOOD PRESSURE: 136 MMHG | OXYGEN SATURATION: 98 % | WEIGHT: 166.9 LBS | HEIGHT: 66 IN | TEMPERATURE: 99 F

## 2020-01-30 VITALS
HEIGHT: 66 IN | BODY MASS INDEX: 26.61 KG/M2 | WEIGHT: 165.6 LBS | DIASTOLIC BLOOD PRESSURE: 70 MMHG | SYSTOLIC BLOOD PRESSURE: 122 MMHG

## 2020-01-30 DIAGNOSIS — Z85.3 PERSONAL HISTORY OF MALIGNANT NEOPLASM OF BREAST: Primary | ICD-10-CM

## 2020-01-30 DIAGNOSIS — G47.09 OTHER INSOMNIA: ICD-10-CM

## 2020-01-30 DIAGNOSIS — N39.41 URGE INCONTINENCE OF URINE: Primary | ICD-10-CM

## 2020-01-30 DIAGNOSIS — N95.2 VAGINAL ATROPHY: ICD-10-CM

## 2020-01-30 LAB
ALBUMIN UR-MCNC: NEGATIVE MG/DL
APPEARANCE UR: CLEAR
BILIRUB UR QL STRIP: NEGATIVE
COLOR UR AUTO: YELLOW
GLUCOSE UR STRIP-MCNC: NEGATIVE MG/DL
HGB UR QL STRIP: NEGATIVE
KETONES UR STRIP-MCNC: NEGATIVE MG/DL
LEUKOCYTE ESTERASE UR QL STRIP: NEGATIVE
NITRATE UR QL: NEGATIVE
PH UR STRIP: 5.5 PH (ref 5–7)
RBC #/AREA URNS AUTO: NORMAL /HPF
SOURCE: NORMAL
SP GR UR STRIP: 1.02 (ref 1–1.03)
UROBILINOGEN UR STRIP-ACNC: 0.2 EU/DL (ref 0.2–1)
WBC #/AREA URNS AUTO: NORMAL /HPF

## 2020-01-30 PROCEDURE — 99214 OFFICE O/P EST MOD 30 MIN: CPT | Performed by: OBSTETRICS & GYNECOLOGY

## 2020-01-30 PROCEDURE — 99214 OFFICE O/P EST MOD 30 MIN: CPT | Mod: ZP | Performed by: PHYSICIAN ASSISTANT

## 2020-01-30 PROCEDURE — 81001 URINALYSIS AUTO W/SCOPE: CPT | Performed by: OBSTETRICS & GYNECOLOGY

## 2020-01-30 PROCEDURE — 87086 URINE CULTURE/COLONY COUNT: CPT | Performed by: OBSTETRICS & GYNECOLOGY

## 2020-01-30 PROCEDURE — G0463 HOSPITAL OUTPT CLINIC VISIT: HCPCS | Mod: ZF

## 2020-01-30 RX ORDER — ZOLPIDEM TARTRATE 5 MG/1
5 TABLET ORAL
Qty: 60 TABLET | Refills: 0 | Status: SHIPPED | OUTPATIENT
Start: 2020-01-30 | End: 2020-07-17

## 2020-01-30 RX ORDER — ZOLPIDEM TARTRATE 5 MG/1
5 TABLET ORAL
Qty: 60 TABLET | Refills: 0 | Status: SHIPPED | OUTPATIENT
Start: 2020-01-30 | End: 2020-03-06

## 2020-01-30 ASSESSMENT — PAIN SCALES - GENERAL: PAINLEVEL: NO PAIN (0)

## 2020-01-30 ASSESSMENT — MIFFLIN-ST. JEOR
SCORE: 1328.8
SCORE: 1322.91

## 2020-01-30 NOTE — LETTER
"1/30/2020    RE: Namita GÓMEZ [facility]  [address] [place] [address]     ONCOLOGY FOLLOW UP VISIT  July 29, 2019     Namita returns today for followup of a stage I, T1b N0, ER/ME-positive and HER2-negative breast cancer.      HISTORY OF PRESENT ILLNESS:   Namita is a 62-year-old woman who comes in today for followup for her breast cancer. Please see prior notes by Dr. Alegria for further detail (06/01/2015).  Briefly, Namita underwent a mammogram and ultrasound. Mammogram on 04/06/2015 suggested a 1.5 cm abnormal distortion confirmed by ultrasound.  By contrast mammography, this revealed an 18 mm area of abnormality at the 2 o'clock position involving her right breast.  Biopsy was recommended and confirmed an invasive ductal carcinoma, grade 1, ER/ME-positive and HER2-negative.  She underwent a lumpectomy and sentinel lymph node biopsy by Dr. Yoshi Oconnor.  Her final staging was a 6 mm tumor, T1b N0, ER/ME-positive and HER2-negative.  All margins were negative. She completed her RTx (52.4 Gy in 20 sessions 6/31-7/27). She started Tamoxifen in 09/2015. She switched to letrozole July 2018.       INTERVAL HISTORY:   She returns today for routine follow-up. She remains on letrozole. She had had no issues with letrozole, no side effects to report. She explains that she had a rough year in 2019, with being attacked by a dog ultiamtely requiring IV antibiotics and hospitalization, and having a uterine polyp. She loves her OB/GYn and is happy to finally feel like she has a good relationship with a women's health provider. No issues with Fosamax. Exercising at least three times weekly with a , with additional walking as well. She otherwise has no concerns.    Her 10-point review of systems is otherwise negative.         PHYSICAL EXAMINATION:  /88   Pulse 80   Temp 99  F (37.2  C) (Oral)   Ht 1.676 m (5' 6\")   Wt 75.7 kg (166 lb 14.4 oz)   SpO2 98%   BMI 26.94 kg/m     GENERAL:  She is well-appearing, in no " apparent distress.   HEENT:  Exam reveals no icterus.  Oropharynx is clear.  There are no ulcers or lesions.   NECK:  Supple.  No cervical, supraclavicular or axillary adenopathy.   HEART:  Regular.   LUNGS:  Clear bilaterally.   ABDOMEN:  Soft, nontender and nondistended.   BREASTS:  Well-healed surgical scar in her right breast with underlying scar tissue,  no mass or nodule palpated in either breast  SKIN: no rashes         ASSESSMENT AND PLAN:    This 62-year-old woman with a T1b N0, ER/NH-positive and HER2-negative invasive ductal carcinoma, grade 1, involving the right breast, status post lumpectomy and right breast RTx. She is now on Tamoxifen for adjuvant endocrine therapy.     1.  Breast cancer.  [id number], stage IA, start tamoxifen Sept 2015, switched to letrozole July 2018 with plan to complete at least 5 years of endocrine therapy. She is tolerating the AI without any issues.   - Annual mammogram due July 2020  - Continue follow-up every 6 months for now, discussed transitioning to PCP at some point when AI completed      2. Osteoporosis in the radius bone, left spine and neck:  She has previously discussed bisphosphonates with Dr. Alegria when switching to AI. Her DEXA scan on 7/8/19 showed stable disease with slight improvement. She started Fosamax at that time. Tolerates well.  - Plan for repeat DEXA 7/2021  - Continue Ca++/vit D, strength exercises, as doing.     3. Diet and exercise. Reviewed goal for 150 minutes moderate intensity activity weekly.     4. Insomnia. Refilled ambien. She uses PRN without side effects.    Jennifer Ty PA-C

## 2020-01-30 NOTE — NURSING NOTE
"Oncology Rooming Note    January 30, 2020 10:54 AM   Namita Hernandez is a 64 year old female who presents for:    Chief Complaint   Patient presents with     Oncology Clinic Visit     [facility] RETURN; BREAST CANCER     Initial Vitals: /88   Pulse 80   Temp 99  F (37.2  C) (Oral)   Ht 1.676 m (5' 6\")   Wt 75.7 kg (166 lb 14.4 oz)   SpO2 98%   BMI 26.94 kg/m   Estimated body mass index is 26.94 kg/m  as calculated from the following:    Height as of this encounter: 1.676 m (5' 6\").    Weight as of this encounter: 75.7 kg (166 lb 14.4 oz). Body surface area is 1.88 meters squared.  No Pain (0) Comment: Data Unavailable   No LMP recorded. Patient is postmenopausal.  Allergies reviewed: Yes  Medications reviewed: Yes    Medications: MEDICATION REFILLS NEEDED TODAY. Provider was notified.  Pharmacy name entered into [facility]: [id number] - [place], [place] - [address] [place] [address]    Clinical concerns: Refill on Ambien.  Jennifer Ty PA-C was notified.      Christin Tillman, RENETTA              "

## 2020-01-30 NOTE — PROGRESS NOTES
"ONCOLOGY FOLLOW UP VISIT  July 29, 2019     Namita returns today for followup of a stage I, T1b N0, ER/OH-positive and HER2-negative breast cancer.      HISTORY OF PRESENT ILLNESS:   Namita is a 62-year-old woman who comes in today for followup for her breast cancer. Please see prior notes by Dr. Alegria for further detail (06/01/2015).  Briefly, Namita underwent a mammogram and ultrasound. Mammogram on 04/06/2015 suggested a 1.5 cm abnormal distortion confirmed by ultrasound.  By contrast mammography, this revealed an 18 mm area of abnormality at the 2 o'clock position involving her right breast.  Biopsy was recommended and confirmed an invasive ductal carcinoma, grade 1, ER/OH-positive and HER2-negative.  She underwent a lumpectomy and sentinel lymph node biopsy by Dr. Yoshi Oconnor.  Her final staging was a 6 mm tumor, T1b N0, ER/OH-positive and HER2-negative.  All margins were negative. She completed her RTx (52.4 Gy in 20 sessions 6/31-7/27). She started Tamoxifen in 09/2015. She switched to letrozole July 2018.       INTERVAL HISTORY:   She returns today for routine follow-up. She remains on letrozole. She had had no issues with letrozole, no side effects to report. She explains that she had a rough year in 2019, with being attacked by a dog violetaely requiring IV antibiotics and hospitalization, and having a uterine polyp. She loves her OB/GYn and is happy to finally feel like she has a good relationship with a women's health provider. No issues with Fosamax. Exercising at least three times weekly with a , with additional walking as well. She otherwise has no concerns.    Her 10-point review of systems is otherwise negative.         PHYSICAL EXAMINATION:  /88   Pulse 80   Temp 99  F (37.2  C) (Oral)   Ht 1.676 m (5' 6\")   Wt 75.7 kg (166 lb 14.4 oz)   SpO2 98%   BMI 26.94 kg/m    GENERAL:  She is well-appearing, in no apparent distress.   HEENT:  Exam reveals no icterus.  Oropharynx is clear.  There " are no ulcers or lesions.   NECK:  Supple.  No cervical, supraclavicular or axillary adenopathy.   HEART:  Regular.   LUNGS:  Clear bilaterally.   ABDOMEN:  Soft, nontender and nondistended.   BREASTS:  Well-healed surgical scar in her right breast with underlying scar tissue,  no mass or nodule palpated in either breast  SKIN: no rashes         ASSESSMENT AND PLAN:    This 62-year-old woman with a T1b N0, ER/NE-positive and HER2-negative invasive ductal carcinoma, grade 1, involving the right breast, status post lumpectomy and right breast RTx. She is now on Tamoxifen for adjuvant endocrine therapy.     1.  Breast cancer.  [id number], stage IA, start tamoxifen Sept 2015, switched to letrozole July 2018 with plan to complete at least 5 years of endocrine therapy. She is tolerating the AI without any issues.   - Annual mammogram due July 2020  - Continue follow-up every 6 months for now, discussed transitioning to PCP at some point when AI completed      2. Osteoporosis in the radius bone, left spine and neck:  She has previously discussed bisphosphonates with Dr. Alegria when switching to AI. Her DEXA scan on 7/8/19 showed stable disease with slight improvement. She started Fosamax at that time. Tolerates well.  - Plan for repeat DEXA 7/2021  - Continue Ca++/vit D, strength exercises, as doing.     3. Diet and exercise. Reviewed goal for 150 minutes moderate intensity activity weekly.     4. Insomnia. Refilled ambien. She uses PRN without side effects.    Jennifer Ty PA-C

## 2020-01-31 LAB
BACTERIA SPEC CULT: NORMAL
Lab: NORMAL
SPECIMEN SOURCE: NORMAL

## 2020-02-06 ENCOUNTER — THERAPY VISIT (OUTPATIENT)
Dept: PHYSICAL THERAPY | Facility: CLINIC | Age: 65
End: 2020-02-06
Payer: COMMERCIAL

## 2020-02-06 ENCOUNTER — OFFICE VISIT (OUTPATIENT)
Dept: INTERNAL MEDICINE | Facility: CLINIC | Age: 65
End: 2020-02-06
Payer: COMMERCIAL

## 2020-02-06 VITALS
DIASTOLIC BLOOD PRESSURE: 85 MMHG | HEART RATE: 86 BPM | SYSTOLIC BLOOD PRESSURE: 130 MMHG | BODY MASS INDEX: 26.86 KG/M2 | WEIGHT: 166.4 LBS | OXYGEN SATURATION: 95 %

## 2020-02-06 DIAGNOSIS — M54.50 MIDLINE LOW BACK PAIN WITHOUT SCIATICA, UNSPECIFIED CHRONICITY: ICD-10-CM

## 2020-02-06 DIAGNOSIS — M54.50 MIDLINE LOW BACK PAIN WITHOUT SCIATICA, UNSPECIFIED CHRONICITY: Primary | ICD-10-CM

## 2020-02-06 DIAGNOSIS — N39.41 URGE INCONTINENCE OF URINE: ICD-10-CM

## 2020-02-06 PROCEDURE — 97112 NEUROMUSCULAR REEDUCATION: CPT | Mod: GP | Performed by: PHYSICAL THERAPIST

## 2020-02-06 PROCEDURE — 97161 PT EVAL LOW COMPLEX 20 MIN: CPT | Mod: GP | Performed by: PHYSICAL THERAPIST

## 2020-02-06 PROCEDURE — 97535 SELF CARE MNGMENT TRAINING: CPT | Mod: GP | Performed by: PHYSICAL THERAPIST

## 2020-02-06 PROCEDURE — 97140 MANUAL THERAPY 1/> REGIONS: CPT | Mod: GP | Performed by: PHYSICAL THERAPIST

## 2020-02-06 ASSESSMENT — PAIN SCALES - GENERAL: PAINLEVEL: NO PAIN (0)

## 2020-02-06 NOTE — PROGRESS NOTES
[facility] Initial Evaluation  Subjective:    SUBJECTIVE:  Patient reports onset of symptoms of urinary incontinence ongoing for past year.  Was seen by another PT last Dec and did get results.  Patient also complaining of R SI/LBP and will get pain down her R leg.  Reports she was also treated for this problem.  Exacerbated her R SI/R low back in pilates this last week when she overextended on reformer performing  Standing lateral lunge.  Pt reports incontinence can occure randomly. Will feel leaking without any warning.  Pain in R SI/LB with getting out of bed in the AM, sleeping at night and with transfers. Pain can be about 5/10 or worse depending on activity.  Since onset symptoms have been getting better, worse or staying the same? Same.  PMHx also includes breast cancer (incontinence started after Tamoxifen treatment for cancer a year ago), also had hysteroscopy in Dec 2019 for uterine polyp.    Urination:  Do you leak on the way to the bathroom or with a strong urge to void? No    Do you leak with cough,sneeze, jumping, running?Yes   Any other activities that cause leaking? Lifting  Do you have triggers that make you feel you can't wait to go to the bathroom? Yes   what are they: running water.  Type of pad and number used per day? Sometimes none - sometimes 2-3   When you leak what is the amount? small    How long can you delay the need to urinate? As needed, reports she voids every 4-5 hours.   How many times do you get up to urinate at night? 1  Can you stop the flow of urine when on the toilet? Yes  Is the volume of urine passed usually: medium. (8sec rule=  250ml with average bladder storing  400-600ml)    Do you strain to pass urine? No  Do you have a slow or hesitant urinary stream? No  Do you have difficulty initiating the urine stream? No    How many bladder infections have you had in last 12 months?1    Fluid intake(one glass is 8oz or one cup) 4-5 glasses/day, 1-2 caffinated  "glasses/day  1-2  alcohol glasses/day.    Bowel habits:  Frequency of bowel movements?2  times a day  Consistancy of stool? soft formed  Do you ignore the urge to defecate? No  Do you strain to pass stool? No    Pelvic Pain:  When do you have pelvic pain? Often feels pain in R SI area, R buttock and low back, feels discomfort in the \"tissue\" of perineal area  Given birth? No   Are you sexually active?No  Have you ever been worried for your physical safety? No  Any abdominal or pelvic surgeries?hysteroscopy in Dec 2019 due to uterine Polyp  Are you having any regular exercise?yes - pilates, gym/weight machines, walking  Have you practiced the PF(kegel) exercises for 4 or more weeks?yes              Objective:        Flexibility/Screens:   Positive screens:  SI Joint                   Lumbar/SI Evaluation  ROM:    AROM Lumbar:   Flexion:        Mod loss  Ext:                    Mod loss with pain R SI area   Side Bend:        Left:     Right:   Rotation:           Left:     Right:   Side Glide:        Left:     Right:           Lumbar Myotomes:  normal            Lumbar DTR's:  not assessed      Cord Signs:  not assessed    Lumbar Dermtomes:  not assessed                    Functional Tests:  Core strength and proprioception lumbar: poor TrA, poor stability with ASLR.        Lumbar Provocation:    Left positive with:  PROM hip  Right positive with: PROM hip    SI joint/Sacrum:          Left negative at:    Ilium Ventromedial  Right positive at:    Squish and Thigh thrust  Right negative at:  Ilium Ventromedial    Sacral conclusion right:  Posterior inominate                        Pelvic Dysfunction Evaluation:        Flexibility:    Tightness present at:Piriformis and Gluteals      Pelvic Clock Exam:    Ischiocavernosis pain:  -  Bulbocavernosis pain:  -  Transverse Perineal:  -  Levator ANI:  +  Perineal Body:  -      External Assessment:    Skin Condition:  Normal    Bearing Down/Coughing:  Normal  Tissue Symmetry: "  Normal  Introitus:  Normal  Muscle Contraction/Perineal Mobility:  Elevation and urogential triangle descent  Internal Assessment:      Contraction/Grade:  Fair squeeze, definite lift (3)          SEMG Biofeedback:    Equipment:  MR20    Suraface electrode placement--Perianal:  Yes  Baseline EMG PM:  2.7 uV    Peak pelvic muscle contraction:  16.2 uV average endurance    EMG interpretation to fatigue:  8-10 seconds  Position:  Supine          Hip Evaluation        Hip Palpation:      Right hip tenderness present at:  PSIS  Functional Testing:          Quad:    Single leg squat:   Left:    Mild loss of control, excessive anterior knee excursion and decreased hip/trunk flexion  Right:   Decreased hip/trunk flexion, excessive anterior knee excursion, femoral IR and moderate loss of control                     General     ROS    Assessment/Plan:    Patient is a 64 year old female with pelvic complaints.    Patient has the following significant findings with corresponding treatment plan.                Diagnosis 1:  Pelvic floor dysfunction   Pain -  manual therapy, self management, education and home program  Decreased ROM/flexibility - manual therapy, therapeutic exercise, therapeutic activity and home program  Decreased strength - therapeutic exercise, therapeutic activities and home program  Impaired muscle performance - biofeedback, neuro re-education and home program  Decreased function - therapeutic activities and home program  Impaired posture - neuro re-education, therapeutic activities and home program  Instability -  Therapeutic Activity  Therapeutic Exercise  Neuromuscular Re-education  home program    Therapy Evaluation Codes:   1) History comprised of:   Personal factors that impact the plan of care:      Past/current experiences and Time since onset of symptoms.    Comorbidity factors that impact the plan of care are:      Cancer, Pain at night/rest and osteopenia.     Medications impacting care:  letrozole, osphenia.  2) Examination of Body Systems comprised of:   Body structures and functions that impact the plan of care:      Hip, Lumbar spine, Pelvis and Sacral illiac joint.   Activity limitations that impact the plan of care are:      Sleeping and Urinary incontinence.  3) Clinical presentation characteristics are:   Stable/Uncomplicated.  4) Decision-Making    Low complexity using standardized patient assessment instrument and/or measureable assessment of functional outcome.  Cumulative Therapy Evaluation is: Low complexity.    Previous and current functional limitations:  (See Goal Flow Sheet for this information)    Short term and Long term goals: (See Goal Flow Sheet for this information)     Communication ability:  Patient appears to be able to clearly communicate and understand verbal and written communication and follow directions correctly.  Treatment Explanation - The following has been discussed with the patient:   RX ordered/plan of care  Anticipated outcomes  Possible risks and side effects  This patient would benefit from PT intervention to resume normal activities.   Rehab potential is good.    Frequency:  1 X week, once daily  Duration:  for 8 weeks  Discharge Plan:  Achieve all LTG.  Independent in home treatment program.  Reach maximal therapeutic benefit.    Please refer to the daily flowsheet for treatment today, total treatment time and time spent performing 1:1 timed codes.

## 2020-02-06 NOTE — NURSING NOTE
Chief Complaint   Patient presents with     Pain     Pt reporting lower back pain that can radiate down her leg; pt reports that the pain has become more severe in the last few weeks      Vania Garzon EMT at 11:52 AM sign on 2/6/2020

## 2020-02-06 NOTE — PATIENT INSTRUCTIONS
Pleasure meeting you!   We discussed your low back pain today and I prescribed you diclofenac gel as well as physical therapy referral. At this point I'm more suspicious of a muscular etiology rather than nervous.   If your symptoms do not improve in 1-2 months with tylenol/ibuprofen as well as PT I'd recommend you return.   At this point you may establish care with the provider of your choice and review your symptoms.     Best wishes,  Elle Bang

## 2020-02-06 NOTE — PROGRESS NOTES
"                     PRIMARY CARE CENTER     ASSESSMENT/PLAN:  Namita Hernandez is a 64 year old female with a PMHx of Breast cancer, Thyroid nodules, Osteoporosis on alendronate, who presents for low back pain.     Midline low back pain without sciatica  Suspect most likely MSK however patient does have significant osteoporosis on Alendronate. Thus, if pain does not improve with conservative mgmt with NSAIDs and PT, would consider imaging in 1-2 months. No red flag signs.   -     diclofenac (VOLTAREN) 1 % topical gel; Place 4 g onto the skin 4 times daily  -     PHYSICAL THERAPY REFERRAL; Future    Pt should return to clinic for f/u with new PCP in 1-2 months.     Elle Stewart MD  Feb 6, 2020    Pt was discussed with     While the patient was in clinic, I reviewed the pertinent medical history and results.  I discussed the current findings on physical examination, as well as the patient s diagnosis and treatment plan with the resident and agree with the information as documented with the following exceptions: none.  --------------------------------------------------------------------------------------------------------------------------------------------  SUBJECTIVE:  Namita Hernandez is a 64 year old female with a PMHx of Breast cancer, Thyroid nodules, Osteoporosis on alendronate, who presents for low back pain.     States she has worsening \"hip pain\" but points to her low back and states it has worsened over the past 3 weeks since a pilates session. She works with a  twice a week and states she feels as though this is most likely an injury related pain but isn't sure and wanted to make sure she assessed it. Has trouble most at night time and is quite stiff in the morning - once she \"gets going\" the pain is improved. Has trouble in R > L side and has trouble crossing her legs. She denies pain in her groin region. States that the pain is consistently improved with advil, tylenol and heat. Is " having trouble sleeping at night due to some component of pain radiating down her legs - however feels this is not similar to her prior episodes of sciatica.     She states she has worked with physical therapy extensively in the past fr pelvic floor issues and back pain ad feels like it's helped in the past.     Denies loss of bladder or bowel (however does have mild leaking of urine at times - long standing).     Past Medical History:   Diagnosis Date     Breast cancer (H) 4/2015     Colon polyps      Hypovitaminosis D      Kidney stone 2010     Multiple thyroid nodules 2015    right dominant 1.3 cm; benign cytology 5/15     Osteoporosis 7/15    lowest T-score -3.2 33% radius     Pancreatic divisum      Recurrent UTI     since menopause (age 47) 3 x per year      Medications and allergies reviewed by me today.     ROS:   Constitutional, HEENT, cardiovascular, pulmonary, gi and gu systems are negative, except as otherwise noted.    OBJECTIVE:    /85 (BP Location: Right arm, Patient Position: Sitting, Cuff Size: Adult Large)   Pulse 86   Wt 75.5 kg (166 lb 6.4 oz)   SpO2 95%   Breastfeeding No   BMI 26.86 kg/m     Wt Readings from Last 1 Encounters:   02/06/20 75.5 kg (166 lb 6.4 oz)       GENERAL APPEARANCE: healthy, alert and no distress     EYES: EOMI, PERRL     MS: extremities normal- no gross deformities noted, gait normal and no tenderness to palpation over lumbar sipne, trochanteric bursa or paraspinal tenderness. Negative straight leg raise, negative richard test.      SKIN: no suspicious lesions or rashes     PSYCH: mentation appears normal. and affect normal/bright

## 2020-02-07 NOTE — PROGRESS NOTES
[facility] Initial Evaluation  Subjective:       Pertinent medical history includes:  Cancer.  Medical allergies: other. Other medical allergies details: sulfa.  Surgeries include:  Cancer surgery and other.  Current medications:  Bone density, sleep medication and other.   Primary job tasks include:  Computer work and prolonged sitting.           Patient is an executive .     Red flags:  Pain at rest/night.                      Objective:  System    Physical Exam    General     ROS    Assessment/Plan:

## 2020-02-13 ENCOUNTER — THERAPY VISIT (OUTPATIENT)
Dept: PHYSICAL THERAPY | Facility: CLINIC | Age: 65
End: 2020-02-13
Payer: COMMERCIAL

## 2020-02-13 DIAGNOSIS — M53.3 SACROILIAC JOINT PAIN: ICD-10-CM

## 2020-02-13 DIAGNOSIS — M54.41 RIGHT-SIDED LOW BACK PAIN WITH RIGHT-SIDED SCIATICA: ICD-10-CM

## 2020-02-13 DIAGNOSIS — N39.41 URGE INCONTINENCE OF URINE: Primary | ICD-10-CM

## 2020-02-13 PROCEDURE — 97530 THERAPEUTIC ACTIVITIES: CPT | Mod: GP | Performed by: PHYSICAL THERAPIST

## 2020-02-13 PROCEDURE — 97110 THERAPEUTIC EXERCISES: CPT | Mod: GP | Performed by: PHYSICAL THERAPIST

## 2020-02-13 PROCEDURE — 97140 MANUAL THERAPY 1/> REGIONS: CPT | Mod: GP | Performed by: PHYSICAL THERAPIST

## 2020-02-25 ENCOUNTER — THERAPY VISIT (OUTPATIENT)
Dept: PHYSICAL THERAPY | Facility: CLINIC | Age: 65
End: 2020-02-25
Payer: COMMERCIAL

## 2020-02-25 DIAGNOSIS — M53.3 SACROILIAC JOINT PAIN: ICD-10-CM

## 2020-02-25 DIAGNOSIS — M54.50 MIDLINE LOW BACK PAIN WITHOUT SCIATICA, UNSPECIFIED CHRONICITY: ICD-10-CM

## 2020-02-25 DIAGNOSIS — M54.41 RIGHT-SIDED LOW BACK PAIN WITH RIGHT-SIDED SCIATICA: ICD-10-CM

## 2020-02-25 DIAGNOSIS — N39.41 URGE INCONTINENCE OF URINE: Primary | ICD-10-CM

## 2020-02-25 PROCEDURE — 97112 NEUROMUSCULAR REEDUCATION: CPT | Mod: GP | Performed by: PHYSICAL THERAPIST

## 2020-02-25 PROCEDURE — 97110 THERAPEUTIC EXERCISES: CPT | Mod: GP | Performed by: PHYSICAL THERAPIST

## 2020-02-25 PROCEDURE — 97140 MANUAL THERAPY 1/> REGIONS: CPT | Mod: GP | Performed by: PHYSICAL THERAPIST

## 2020-03-06 ENCOUNTER — OFFICE VISIT (OUTPATIENT)
Dept: INTERNAL MEDICINE | Facility: CLINIC | Age: 65
End: 2020-03-06
Payer: COMMERCIAL

## 2020-03-06 VITALS
OXYGEN SATURATION: 96 % | WEIGHT: 165 LBS | DIASTOLIC BLOOD PRESSURE: 77 MMHG | BODY MASS INDEX: 26.63 KG/M2 | SYSTOLIC BLOOD PRESSURE: 123 MMHG | HEART RATE: 75 BPM

## 2020-03-06 DIAGNOSIS — G47.09 OTHER INSOMNIA: ICD-10-CM

## 2020-03-06 DIAGNOSIS — F43.22 ADJUSTMENT DISORDER WITH ANXIOUS MOOD: Primary | ICD-10-CM

## 2020-03-06 RX ORDER — ZOLPIDEM TARTRATE 5 MG/1
5 TABLET ORAL
Qty: 60 TABLET | Refills: 0 | Status: SHIPPED | OUTPATIENT
Start: 2020-03-06 | End: 2020-06-09

## 2020-03-06 ASSESSMENT — PAIN SCALES - GENERAL: PAINLEVEL: NO PAIN (0)

## 2020-03-06 NOTE — PATIENT INSTRUCTIONS
[facility] Medication Refill Request Information:  * Please contact your pharmacy regarding ANY request for medication refills.  ** [facility] Prescription Fax = [phone]  * Please allow 3 business days for routine medication refills.  * Please allow 5 business days for controlled substance medication refills.     [facility] Test Result notification information:  *You will be notified with in 7-10 days of your appointment day regarding the results of your test.  If you are on MyChart you will be notified as soon as the provider has reviewed the results and signed off on them.    [facility]: [phone]

## 2020-03-06 NOTE — NURSING NOTE
Chief Complaint   Patient presents with     Establish Care     Pt is here to establish a new PCP. Prior PCP Dr. Villegas.      Verito Arzate LPN at 9:34 AM on 3/6/2020.

## 2020-03-06 NOTE — PROGRESS NOTES
[facility]  Primary Care Center   Rubina Martinez MD  2020      Chief Complaint:   Establish Care       History of Present Illness:   Namita Hernandez is a 65 year old female with a history of breast cancer, colon polyps, and recurrent UTI who presents to [facility] care (previous Dr. Villegas patient).     History of breast cancer: She had breast cancer in  and is still taking Letrozole 2.5 mg daily. She may stop taking this at the end of summer 2020 per her oncologist Dr. Alegria. She is taking Fosamax 70 mg weekly for low bone density while she takes Letrozole. She used to take Tamoxifen, but switched about 1.5 years ago. She is stable and will be graduating from oncology follow up care soon.      Stress: She has had a very stressful past couple of years. She lost both of her parents and was helping care for both of them before they . Many of her family members were unhelpful in this process. She was attacked by a dog last year (her injuries from this are now healed). She got laid off from her job last month, unexpectedly. She feels that she is not great at relaxing, but now that she is not working, she is doing more yoga.     Gyn: When she started taking Tamoxifen, she started to have difficulty with recurrent UTIs and urge incontinence. She used to use a Vagifem suppository, but it did not make a significant difference for her after a few years. She now takes Osphena 60 mg daily. She went to pelvic floor PT. After starting this physical therapy, she started to have worsening hip pain and urinary issues. She was found to have an endometrial polyp, and removal of this helped some of her symptoms. However, they returned, so she went back to PT and biofeedback showed that she had a hypertonic pelvic floor. She continues to follow with PT and OB/GYN and has improved greatly. She has noticed abdominal weight gain post-menopause.      Review of Systems:   Pertinent items are noted in HPI or as in patient  entered ROS below, remainder of complete ROS is negative.     Active Medications:     Current Outpatient Medications:      acetaminophen (TYLENOL) 500 MG tablet, Take 1,000 mg by mouth, Disp: , Rfl:      alendronate (FOSAMAX) 70 MG tablet, Take 1 tablet (70 mg) by mouth every 7 days, Disp: 12 tablet, Rfl: 3     CALCIUM PO, Take 2-4 tablets by mouth daily , Disp: , Rfl:      cholecalciferol (VITAMIN D) 1000 UNIT tablet, Take by mouth daily, Disp: 30 tablet, Rfl:      diclofenac (VOLTAREN) 1 % topical gel, Place 4 g onto the skin 4 times daily, Disp: 100 g, Rfl: 1     ibuprofen (ADVIL/MOTRIN) 800 MG tablet, Take 1 tablet (800 mg) by mouth every 8 hours as needed for moderate pain, Disp: 30 tablet, Rfl: 0     letrozole (FEMARA) 2.5 MG tablet, TAKE 1 TABLET BY MOUTH EVERY DAY, Disp: 90 tablet, Rfl: 3     ospemifene (OSPHENA) 60 MG tablet, Take 1 tablet (60 mg) by mouth every morning, Disp: 90 tablet, Rfl: 0     vitamin  B complex with vitamin C (VITAMIN  B COMPLEX) TABS, Take 1 tablet by mouth daily, Disp: , Rfl:      zolpidem (AMBIEN) 5 MG tablet, Take 1 tablet (5 mg) by mouth nightly as needed, Disp: 60 tablet, Rfl: 0     zolpidem (AMBIEN) 5 MG tablet, Take 1 tablet (5 mg) by mouth nightly as needed, Disp: 60 tablet, Rfl: 0     zolpidem (AMBIEN) 5 MG tablet, Take 1 tablet (5 mg) by mouth nightly as needed, Disp: 60 tablet, Rfl: 0      Allergies:   Sulfa drugs      Past Medical History:  Breast cancer  Colon polyps   Hypovitaminosis D  Kidney stone  Multiple thyroid nodules  Osteoporosis  Pancreatic divisum  Recurrent UTI  Cervical adenopathy  Left elbow pain  Right shoulder pain  Parathyroid abnormality   Endometrial polyp     Past Surgical History:  Dilation and curettage, operative hysteroscopy with morcellator, combined: 12/2019  Excision benign lesion: 1997  Strabismus surgery: 1960  Lumpectomy right breast with sentinel node, combined: 5/2015    Family History:   Breast cancer: maternal grandmother,  mother  Thyroid disease: mother  Diabetes: maternal grandfather   Meniere's disease: father       Social History:   Smoking status: former smoker, quit 1986  Alcohol use: not currently     Physical Exam:   /77   Pulse 75   Wt 74.8 kg (165 lb)   SpO2 96%   BMI 26.63 kg/m     Constitutional: Alert. In no distress.  Psychiatric: Mentation appears normal. Normal affect.    Total time spent 25 minutes.  More than 50% of the time spent with Ms. Hernandez on counseling / coordinating her care        Assessment and Plan:  Other insomnia  Refilled. She usually splits this tablet.   - zolpidem (AMBIEN) 5 MG tablet  Dispense: 60 tablet; Refill: 0    Adjustment disorder with anxious mood  She has had a very stressful past two years and feels that she does not cope well with stress. Referred to health psychology to help with management strategies. She is not interested in starting medication. Also recommended support groups and CBT apps such as [employer] and [employer].  - [facility]    Follow-up: Return in about 6 months (around 9/6/2020) for Physical Exam, labs.        Scribe Disclosure:  Radha GUTIERREZ, am serving as a scribe to document services personally performed by Rubina Martinez MD at this visit, based upon the provider's statements to me. All documentation has been reviewed by the aforementioned provider prior to being entered into the official medical record.    Scribe Preparation Attestation:  Radha GUTIERREZ, a scribe, prepared the chart for today's encounter.      Portions of this medical record were completed by a scribe. UPON MY REVIEW AND AUTHENTICATION BY ELECTRONIC SIGNATURE, this confirms (a) I performed the applicable clinical services, and (b) the record is accurate.     -- Pati Martinez MD

## 2020-03-09 ENCOUNTER — TELEPHONE (OUTPATIENT)
Dept: OBGYN | Facility: CLINIC | Age: 65
End: 2020-03-09

## 2020-03-09 DIAGNOSIS — N94.10 DYSPAREUNIA, FEMALE: ICD-10-CM

## 2020-03-09 NOTE — TELEPHONE ENCOUNTER
[facility] pharmacy called because the medication refill for ospemifene (OSPHENA) 60 MG tablet did not go thorough. Please return call to pharmacy at [phone]

## 2020-03-10 NOTE — TELEPHONE ENCOUNTER
Clarified with pharmacy.  Patient had requested auto refill but had no refills.    Dr Oliver,  I heard from my oncologist that it is ok to try Osphena. Her message is copied below.     Ju,     I think you can go ahead and try these medications. There is not a lot of data on Osphena in breast cancer survivors. I believe for a short period it is okay to try.     Let me know if you need anything else.     Delphine Alegria    Attempted call to patient to clarify if wanted refill and to discuss what was meant by short term use by the Oncologist.  Told to call back to discuss.  Annabella Nguyen, RN on 3/10/2020 at 10:02 AM

## 2020-03-11 RX ORDER — OSPEMIFENE 60 MG/1
60 TABLET, FILM COATED ORAL EVERY MORNING
Qty: 90 TABLET | Refills: 0 | Status: SHIPPED | OUTPATIENT
Start: 2020-03-11 | End: 2020-06-11

## 2020-03-11 NOTE — TELEPHONE ENCOUNTER
The oncologist knows that this is replacing something that she has been on for the last 20 years.  She is sure that by short term use she meant a couple of years.    rx sent.  Pt verbalized understanding, in agreement with plan, and voiced no further questions.  Annabella Nguyen RN on 3/11/2020 at 10:50 AM

## 2020-04-08 ENCOUNTER — TELEPHONE (OUTPATIENT)
Dept: PHYSICAL THERAPY | Facility: CLINIC | Age: 65
End: 2020-04-08

## 2020-04-08 NOTE — TELEPHONE ENCOUNTER
Courtesy call and LVM informing patient of virtual visit option. Given phone number to call to schedule if interested or to let us know how to continue to proceed with visits.

## 2020-04-16 ENCOUNTER — TELEPHONE (OUTPATIENT)
Dept: PHYSICAL THERAPY | Facility: CLINIC | Age: 65
End: 2020-04-16

## 2020-04-16 NOTE — TELEPHONE ENCOUNTER
Pt returned call.  She is managing with HEP and does not need a virtual visit at this time.  Would like a call when clinic opens up to check in on PT exercises.

## 2020-04-27 NOTE — PROGRESS NOTES
"Discharge Note    Progress reporting period is from last soap note on Feb 25, 2020.    Namita did not return to PT and wants to wait to be seen post COVID 19 in person.  Please see information below for last relevant information on current status.  Patient seen for 3 visits.  Resume PT if she feels necessary post COVID 19.    SUBJECTIVE  Subjective changes noted by patient:  In the past 2 weeks, pt has had total of about 3 episdoes of UI.  1 with small cough, 1 was maybe because she waited too long.  feels about 60% better overall.  still feels a \"discomfort\" sometimes.    .  Current pain level is 0/10.     Previous pain level was  (having R SI/hip/LBP).   Changes in function:  Yes (See Goal flowsheet attached for changes in current functional level)  Adverse reaction to treatment or activity: None    OBJECTIVE  Changes noted in objective findings: tender B levator ani muscles.  resting tone start of care 2.0 uV.  after MT able to get lower but still averages 2.0 uV.  lowest value able to get down to 0.7 uV.       ASSESSMENT/PLAN  Diagnosis: pelvic floor dysfunction   Updated problem list and treatment plan:   Pain - HEP  Decreased ROM/flexibility - HEP  Decreased function - HEP  Decreased strength - HEP  STG/LTGs have been met or progress has been made towards goals:  Yes, please see goal flowsheet for most current information  Assessment of Progress: current status is unknown.    Last current status: Pt is progressing as expected   Self Management Plans:  HEP  I have re-evaluated this patient and find that the nature, scope, duration and intensity of the therapy is appropriate for the medical condition of the patient.  Namita continues to require the following intervention to meet STG and LTG's:  HEP.    Recommendations:  Discharge with current home program.  Patient to follow up with MD as needed.    Please refer to the daily flowsheet for treatment today, total treatment time and time spent performing 1:1 timed " codes.

## 2020-06-05 DIAGNOSIS — G47.09 OTHER INSOMNIA: ICD-10-CM

## 2020-06-08 NOTE — TELEPHONE ENCOUNTER
Patient Requested  zolpidem (AMBIEN) 5 MG tablet   Last Filled  03/06/2020  Last Office Visit  03/06/2020  Next Office Visit     Checked  06/08/2020    DX:     Pharmacy:     CANDY COLEMAN CMA at 11:44 AM on 6/8/2020.

## 2020-06-09 RX ORDER — ZOLPIDEM TARTRATE 5 MG/1
5 TABLET ORAL
Qty: 60 TABLET | Refills: 0 | Status: SHIPPED | OUTPATIENT
Start: 2020-06-09 | End: 2020-09-02

## 2020-06-11 DIAGNOSIS — N94.10 DYSPAREUNIA, FEMALE: ICD-10-CM

## 2020-06-11 RX ORDER — OSPEMIFENE 60 MG/1
60 TABLET, FILM COATED ORAL EVERY MORNING
Qty: 90 TABLET | Refills: 0 | Status: SHIPPED | OUTPATIENT
Start: 2020-06-11 | End: 2020-09-24

## 2020-06-11 NOTE — TELEPHONE ENCOUNTER
Requested Prescriptions   Pending Prescriptions Disp Refills     ospemifene (OSPHENA) 60 MG tablet 90 tablet 0     Sig: Take 1 tablet (60 mg) by mouth every morning       There is no refill protocol information for this order        Last Written Prescription Date:  03/11/2020  Last Fill Quantity: 90,  # refills: 0   Last office visit: 1/30/2020 with prescribing provider:  Sushila Call   Future Office Visit:  None

## 2020-06-11 NOTE — TELEPHONE ENCOUNTER
Last OV 1/30/20 with Dr Call: 2. Atrophy- continue the osphena.  She has only been on osphena for the frequent UTIs.       Oncologist notes in 3/9/20 telephone visit:    I think you can go ahead and try these medications. There is not a lot of data on Osphena in breast cancer survivors. I believe for a short period it is okay to try.   Let me know if you need anything else.   Delphine Alegria    Routing to provider to authorize refill request - do you want further refills going forward?  See pended med    Aracely Villatoro, RN on 6/11/2020 at 4:09 PM

## 2020-06-19 DIAGNOSIS — M81.8 OTHER OSTEOPOROSIS, UNSPECIFIED PATHOLOGICAL FRACTURE PRESENCE: ICD-10-CM

## 2020-06-19 RX ORDER — ALENDRONATE SODIUM 70 MG/1
70 TABLET ORAL
Qty: 12 TABLET | Refills: 3 | Status: SHIPPED | OUTPATIENT
Start: 2020-06-19 | End: 2021-05-18

## 2020-07-17 ENCOUNTER — TELEPHONE (OUTPATIENT)
Dept: OBGYN | Facility: CLINIC | Age: 65
End: 2020-07-17

## 2020-07-17 ENCOUNTER — OFFICE VISIT (OUTPATIENT)
Dept: OBGYN | Facility: CLINIC | Age: 65
End: 2020-07-17
Payer: COMMERCIAL

## 2020-07-17 VITALS
BODY MASS INDEX: 25.88 KG/M2 | HEIGHT: 66 IN | SYSTOLIC BLOOD PRESSURE: 110 MMHG | DIASTOLIC BLOOD PRESSURE: 62 MMHG | WEIGHT: 161 LBS

## 2020-07-17 DIAGNOSIS — R39.9 UTI SYMPTOMS: Primary | ICD-10-CM

## 2020-07-17 DIAGNOSIS — R39.15 URINARY URGENCY: Primary | ICD-10-CM

## 2020-07-17 DIAGNOSIS — N76.0 ACUTE VAGINITIS: ICD-10-CM

## 2020-07-17 DIAGNOSIS — N95.2 ATROPHIC VAGINITIS: ICD-10-CM

## 2020-07-17 LAB
GRAM STN SPEC: NORMAL
Lab: NORMAL
SPECIMEN SOURCE: NORMAL
SPECIMEN SOURCE: NORMAL
WET PREP SPEC: NORMAL

## 2020-07-17 PROCEDURE — 87210 SMEAR WET MOUNT SALINE/INK: CPT | Performed by: OBSTETRICS & GYNECOLOGY

## 2020-07-17 PROCEDURE — 87205 SMEAR GRAM STAIN: CPT | Performed by: OBSTETRICS & GYNECOLOGY

## 2020-07-17 PROCEDURE — 99213 OFFICE O/P EST LOW 20 MIN: CPT | Performed by: OBSTETRICS & GYNECOLOGY

## 2020-07-17 RX ORDER — ESTRADIOL 0.1 MG/G
2 CREAM VAGINAL
Qty: 32 G | Refills: 3 | Status: SHIPPED | OUTPATIENT
Start: 2020-07-17 | End: 2020-08-17

## 2020-07-17 ASSESSMENT — MIFFLIN-ST. JEOR: SCORE: 1297.04

## 2020-07-17 NOTE — PROGRESS NOTES
SUBJECTIVE:                                                   Namita Hernandez is a 65 year old female who presents to clinic today for the following health issue(s):  Patient presents with:  Urinary Problem: c/o urgency and itching, with some pain x 2 weeks, mainly happening in the evening. Did try Monistat that did improve symptoms.  Vaginal Problem      HPI:  Patient with vaginal itching.  She trialed monistat.  She has urgency with urination.    No LMP recorded. Patient is postmenopausal..   Patient is not sexually active, .   reports that she quit smoking about 34 years ago. She smoked 0.00 packs per day. She has never used smokeless tobacco.  STD testing offered?  Declined  Health maintenance updated:  yes    Today's PHQ-2 Score:   PHQ-2 (  [employer]) 3/6/2020   Q1: Little interest or pleasure in doing things 0   Q2: Feeling down, depressed or hopeless 0   PHQ-2 Score 0     Today's PHQ-9 Score:   PHQ-9 SCORE 2018   PHQ-9 Total Score 2     Today's JL-7 Score:   JL-7 SCORE 2018   Total Score 0       Problem list and histories reviewed & adjusted, as indicated.  Additional history: as documented.    Patient Active Problem List   Diagnosis     Breast cancer, right breast (H)     Multiple thyroid nodules     Cervical adenopathy     Elbow pain, left     Right shoulder pain     Hypovitaminosis D     Senile osteoporosis     Parathyroid abnormality (H)     History of colonic polyps     Endometrial polyp     Past Surgical History:   Procedure Laterality Date     DILATION AND CURETTAGE, OPERATIVE HYSTEROSCOPY WITH MORCELLATOR, COMBINED N/A 2019    Procedure: HYSTEROSCOPY DILATION AND CURRETAGE WITH MYOSURE;  Surgeon: Sushila Melendez MD;  Location: [facility] OR     EXCISION BENIGN LESION COMPL Right     Abdominal lipoma removed      EYE SURGERY      strabismus     LUMPECTOMY BREAST WITH SENTINEL NODE, COMBINED Right 2015    Procedure: COMBINED LUMPECTOMY BREAST WITH SENTINEL  "NODE;  Surgeon: Yoshi Oconnor MD;  Location:  OR      Social History     Tobacco Use     Smoking status: Former Smoker     Packs/day: 0.00     Last attempt to quit: 1986     Years since quittin.5     Smokeless tobacco: Never Used   Substance Use Topics     Alcohol use: Not Currently      Problem (# of Occurrences) Relation (Name,Age of Onset)    Breast Cancer (2) Maternal Grandmother: late 50s, Mother: late 60s    Diabetes (1) Maternal Grandfather    Meniere's disease (1) Father    Thyroid Disease (1) Mother: nodlue       Negative family history of: Thyroid Cancer, Glaucoma, Macular Degeneration, Retinal detachment, Melanoma            Current Outpatient Medications   Medication Sig     alendronate (FOSAMAX) 70 MG tablet Take 1 tablet (70 mg) by mouth every 7 days     CALCIUM PO Take 2-4 tablets by mouth daily      cholecalciferol (VITAMIN D) 1000 UNIT tablet Take by mouth daily     letrozole (FEMARA) 2.5 MG tablet TAKE 1 TABLET BY MOUTH EVERY DAY     ospemifene (OSPHENA) 60 MG tablet Take 1 tablet (60 mg) by mouth every morning     vitamin  B complex with vitamin C (VITAMIN  B COMPLEX) TABS Take 1 tablet by mouth daily     zolpidem (AMBIEN) 5 MG tablet TAKE 1 TABLET (5 MG) BY MOUTH NIGHTLY AS NEEDED     No current facility-administered medications for this visit.      Allergies   Allergen Reactions     Sulfa Drugs Hives       ROS:  12 point review of systems negative other than symptoms noted below or in the HPI.  Genitourinary: Urgency and Vaginal Itching        OBJECTIVE:     /62   Ht 1.676 m (5' 6\")   Wt 73 kg (161 lb)   BMI 25.99 kg/m    Body mass index is 25.99 kg/m .    Exam:  Constitutional:  Appearance: Well nourished, well developed alert, in no acute distress  Chest:  Respiratory Effort:  Breathing unlabored.   Cardiovascular: Heart: Auscultation:  No edema  Skin: General Inspection:  No rashes present, no lesions present, no areas of discoloration.  Neurologic:  Mental Status:  " Oriented X3.  Normal strength and tone, sensory exam grossly normal, mentation intact and speech normal.    GYN: no discharge, no lesions, some atrophic    In-Clinic Test Results:  No results found for this or any previous visit (from the past 24 hour(s)).    ASSESSMENT/PLAN:                                                        ICD-10-CM    1. Urinary urgency  R39.15        There are no Patient Instructions on file for this visit.    1. Vaginal culture today  2. Wet prep due to itching  3. Urine culture today  4. Estrogen cream sent to pharmacy 3 times weekly    Sushila Call MD  [facility]

## 2020-07-17 NOTE — TELEPHONE ENCOUNTER
Going on for a couple of weeks  Sort of urgency a lot of the time.  Does have itching and burning as well.    Offered OV for exam and UA.  Transferred to scheduling. UA orders placed.

## 2020-08-03 ENCOUNTER — ANCILLARY PROCEDURE (OUTPATIENT)
Dept: MAMMOGRAPHY | Facility: CLINIC | Age: 65
End: 2020-08-03
Payer: COMMERCIAL

## 2020-08-03 ENCOUNTER — VIRTUAL VISIT (OUTPATIENT)
Dept: ONCOLOGY | Facility: CLINIC | Age: 65
End: 2020-08-03
Attending: INTERNAL MEDICINE
Payer: COMMERCIAL

## 2020-08-03 DIAGNOSIS — Z85.3 PERSONAL HISTORY OF MALIGNANT NEOPLASM OF BREAST: ICD-10-CM

## 2020-08-03 DIAGNOSIS — N89.8 VAGINAL DRYNESS: Primary | ICD-10-CM

## 2020-08-03 PROCEDURE — 99214 OFFICE O/P EST MOD 30 MIN: CPT | Mod: 95 | Performed by: PHYSICIAN ASSISTANT

## 2020-08-03 PROCEDURE — 40001009 ZZH VIDEO/TELEPHONE VISIT; NO CHARGE

## 2020-08-03 ASSESSMENT — PAIN SCALES - GENERAL: PAINLEVEL: NO PAIN (0)

## 2020-08-03 NOTE — PROGRESS NOTES
"Namita Hernandez is a 65 year old female who is being evaluated via a billable telephone visit.      The patient has been notified of following:     \"This telephone visit will be conducted via a call between you and your physician/provider. We have found that certain health care needs can be provided without the need for a physical exam.  This service lets us provide the care you need with a short phone conversation.  If a prescription is necessary we can send it directly to your pharmacy.  If lab work is needed we can place an order for that and you can then stop by our lab to have the test done at a later time.    Telephone visits are billed at different rates depending on your insurance coverage. During this emergency period, for some insurers they may be billed the same as an in-person visit.  Please reach out to your insurance provider with any questions.    If during the course of the call the physician/provider feels a telephone visit is not appropriate, you will not be charged for this service.\"    Patient has given verbal consent for Telephone visit?  Yes    What phone number would you like to be contacted at? [phone]    How would you like to obtain your AVS? MyChart    I have reviewed and updated the patient's allergies and medication list. Patient was asked to provide any patient recorded vital signs, height and/or weight.  Please see in \"Patient Reported Vital Signs\" tab information.        Concerns: Pt would like discuss survivorship of 5 years.  What will she need to do.  Pt would also like to discuss medication (Letrozol and Fosamax) .      Refills: No refills needed.            Lety Kaye [facility]    ONCOLOGY FOLLOW UP VISIT  Aug 3, 2020       Namita returns today for followup of a stage I, T1b N0, ER/PA-positive and HER2-negative breast cancer.      HISTORY OF PRESENT ILLNESS:   Namita is a 65-year-old woman who comes in today for followup for her breast cancer. Please see prior notes by Dr. Alegria for " "further detail (06/01/2015).  Briefly, Namita underwent a mammogram and ultrasound. Mammogram on 04/06/2015 suggested a 1.5 cm abnormal distortion confirmed by ultrasound.  By contrast mammography, this revealed an 18 mm area of abnormality at the 2 o'clock position involving her right breast.  Biopsy was recommended and confirmed an invasive ductal carcinoma, grade 1, ER/MS-positive and HER2-negative.  She underwent a lumpectomy and sentinel lymph node biopsy by Dr. Yoshi Oconnor.  Her final staging was a 6 mm tumor, T1b N0, ER/MS-positive and HER2-negative.  All margins were negative. She completed her RTx (52.4 Gy in 20 sessions 6/31-7/27). She started Tamoxifen in 09/2015. She switched to letrozole July 2018.        INTERVAL HISTORY:   Ju was called today for follow up. She remains on letrozole. She had had no issues with letrozole, no side effects to report. She does have some arthralgias, but they are chronic and unchanged by the letrozole.  She has severe vaginal dryness- causing pain.  Using estadiol cream 3 x week.  She had some of this previous to the letrozole, but unsure if the letrozole is significantly making this worse or not.  It is a big deal for her- its painful and she gets more frequent UTIs.      She otherwise has been feeling well, exercising, no new health concerns.      Her 10-point review of systems is otherwise negative.         PHYSICAL EXAMINATION:  Vitals 7/17/2020   Systolic 110   Diastolic 62   Weight 161 lb   Height 5' 6\"   BSA 1.84   BMI 25.99      Voice is clear and strong. No audible stridor, wheezing, or respiratory distress. The remainder of PE was deferred due to PHE.           ASSESSMENT AND PLAN:    This 65-year-old woman with a T1b N0, ER/MS-positive and HER2-negative invasive ductal carcinoma, grade 1, involving the right breast, status post lumpectomy and right breast RTx. She is now on letrozole for adjuvant endocrine therapy.     1.  Breast cancer.  [id number], stage IA, start " tamoxifen Sept 2015 due to osteoporosis, switched to letrozole July 2018 with plan to complete at least 5 years of endocrine therapy. She is tolerating the AI without any issues and is now at 5 years.  Ju and I discussed the data of some cancers deriving benefit from extended estrogen therapy at 7 and 10 years, its hard to know who of all patients do derive this benefit.  She did have an early stage breast cancer, however she also had a mother and a grandmother with breast cancer although no identifiable genetic link has been found. She would like to know if the letrozole does make her vaginal atrophy worse- if it does, she'll stop completely.  If not, should would consider staying on longer given her stable bone density (last was -1.8, previously was -2.2) and no other issues.  She will stop her letrozole and then see Dr Alegria in 3 months to follow up.   - Annual mammogram done today, report pending July 2020  - Continue follow-up every 6 months for now, discussed transitioning to PCP at some point when AI completed      2. Osteoporosis in the radius bone, left spine and neck:  She has previously discussed bisphosphonates with Dr. Alegria when switching to AI. Her DEXA scan on 7/8/19 showed stable disease with slight improvement. She started Fosamax at that time. Tolerates well.  - Plan for repeat DEXA 7/2021  - Continue Ca++/vit D, strength exercises, as doing.     3. Diet and exercise. Reviewed goal for 150 minutes moderate intensity activity weekly.     4. Vaginal atrophy: using estradiol 3 x week with minimal improvement.  Was started on osphena in Jan 2020, she does not feel this has helped much either.      Rubi Gould PA-C  Phone call: 27 min

## 2020-08-17 ENCOUNTER — TELEPHONE (OUTPATIENT)
Dept: OBGYN | Facility: CLINIC | Age: 65
End: 2020-08-17

## 2020-08-17 DIAGNOSIS — N95.2 ATROPHIC VAGINITIS: ICD-10-CM

## 2020-08-17 RX ORDER — ESTRADIOL 0.1 MG/G
2 CREAM VAGINAL
Qty: 32 G | Refills: 3 | Status: SHIPPED | OUTPATIENT
Start: 2020-08-17 | End: 2020-08-20

## 2020-08-17 NOTE — TELEPHONE ENCOUNTER
Medication refilled per [facility] protocol.    Meg Rivera RN

## 2020-08-17 NOTE — TELEPHONE ENCOUNTER
Pt called and is concerned she's using her Estrace cream as instructed 2 grams 3 times a week and she has only used it about  12 times and she is totally out. She called the pharmacy and said she couldn't get a refill until September. So she is wondering if it was dosed wrong or wrong size.

## 2020-08-19 NOTE — TELEPHONE ENCOUNTER
Called pharmacy, pt has refills available however she using her cream up too soon so insurance will not cover the refill yet.    Left msg for pt to call back to discuss.  Mey Reyes RN on 8/19/2020 at 2:41 PM

## 2020-08-19 NOTE — TELEPHONE ENCOUNTER
Patient called back.  She is at the pharmacy and they are telling her that her insurance will not cover a refill this soon.  Patient would like to talk to a nurse. Please give her a call back.    Thank you

## 2020-08-20 RX ORDER — ESTRADIOL 0.1 MG/G
2 CREAM VAGINAL
Qty: 32 G | Refills: 3 | Status: SHIPPED | OUTPATIENT
Start: 2020-08-20 | End: 2021-02-09

## 2020-08-20 NOTE — TELEPHONE ENCOUNTER
Patient stated that she had picked upo the rx and applicator.  Read the directions for 2g and filled applicator to the line marked 2 and didn't think anything about it.  Of course now that she has had this  Issue about not being able to refill and has done the math.  She was probably using twice the dose that she should have been.    The pharmacy has informed her if we sent a new rx with sig.  Use 4 times weekly they would be able to refill.  She is nervous that she will have missed 3 dose if she waits and it has taken this long for your symptoms to improve.    A prescription for sent with sig.  Annabella Nguyen RN on 8/20/2020 at 12:11 PM

## 2020-08-20 NOTE — TELEPHONE ENCOUNTER
Patient is calling back.  She is available and will answer. Please give her a call back.    Thank you

## 2020-09-02 ENCOUNTER — OFFICE VISIT (OUTPATIENT)
Dept: INTERNAL MEDICINE | Facility: CLINIC | Age: 65
End: 2020-09-02
Payer: COMMERCIAL

## 2020-09-02 VITALS
DIASTOLIC BLOOD PRESSURE: 76 MMHG | WEIGHT: 159.7 LBS | OXYGEN SATURATION: 96 % | HEART RATE: 71 BPM | SYSTOLIC BLOOD PRESSURE: 118 MMHG | BODY MASS INDEX: 25.78 KG/M2

## 2020-09-02 DIAGNOSIS — E04.2 MULTIPLE THYROID NODULES: ICD-10-CM

## 2020-09-02 DIAGNOSIS — D64.9 ANEMIA, UNSPECIFIED TYPE: ICD-10-CM

## 2020-09-02 DIAGNOSIS — G47.09 OTHER INSOMNIA: ICD-10-CM

## 2020-09-02 DIAGNOSIS — Z13.220 SCREENING FOR LIPOID DISORDERS: ICD-10-CM

## 2020-09-02 DIAGNOSIS — Z23 ENCOUNTER FOR IMMUNIZATION: Primary | ICD-10-CM

## 2020-09-02 RX ORDER — ZOLPIDEM TARTRATE 5 MG/1
5 TABLET ORAL
Qty: 60 TABLET | Refills: 3 | Status: SHIPPED | OUTPATIENT
Start: 2020-09-02 | End: 2021-06-02

## 2020-09-02 ASSESSMENT — PAIN SCALES - GENERAL: PAINLEVEL: NO PAIN (0)

## 2020-09-02 NOTE — NURSING NOTE
Chief Complaint   Patient presents with     Mass     Pt reports mass on back ankle     Refill Request     Pt needs refills on MAUDE Elena at 1:19 PM sign on 9/2/2020

## 2020-09-03 NOTE — PROGRESS NOTES
"Namita was seen today for physical, f/u and refill request.  Although this visit was labeled \"return\" it was the appointment I recommended 6 months ago for a routine physical and follow up.    She has a past medical history of Breast cancer (H) (4/2015), Colon polyps, Hypovitaminosis D, Kidney stone (2010), Multiple thyroid nodules (2015), Osteoporosis (7/15), Pancreatic divisum, and Recurrent UTI. She also has no past medical history of Chronic infection, Diabetes (H), or Hypertension.     Overall, is doing quite well.  Some increased stress, requests refill of zolpidem for sleep.  Together with her Oncology team, she has recently been taken off aromatase inhibitor due to multiple joint pain and vaginal atrophy.  She completed a total of 5 years antiestrogen therapy (tamoxifen first for 2 years).  She has also been restarted on three times weekly estradiol cream.  This is helping reduce symptoms.  She's been trying to lose weight and found Noom helpful.  Down about 5-6 pounds.    No changes to past, family, or social history.   ROS: 10 point ROS neg other than the symptoms noted above in the HPI.      PHYSICAL EXAM:  /76 (BP Location: Left arm, Patient Position: Sitting, Cuff Size: Adult Regular)   Pulse 71   Wt 72.4 kg (159 lb 11.2 oz)   SpO2 96%   Breastfeeding No   BMI 25.78 kg/m    Constitutional: no distress, comfortable, pleasant   Eyes: anicteric, normal extra-ocular movements   Ears, Nose and Throat: tympanic membranes clear, neck supple with full range of motion, no thyromegaly.   Cardiovascular: regular rate and rhythm, normal S1 and S2, no murmurs, rubs or gallops,  peripheral pulses full and symmetric   Respiratory: clear to auscultation, no wheezes or crackles, normal breath sounds   Gastrointestinal: positive bowel sounds, nontender, no hepatosplenomegaly, no masses   Musculoskeletal: knees full range of motion, no edema   Skin: no concerning lesions, no jaundice   Neurological:  normal gait, " no tremor   Psychological: appropriate mood   Lymphatic: no cervical lymphadenopathy and no pedal edema    A/P  65 year old female here for PE    Encounter for immunization  -     ZOSTER VACCINE RECOMBINANT ADJUVANTED IM NJX    Other insomnia  -     zolpidem (AMBIEN) 5 MG tablet; Take 1 tablet (5 mg) by mouth nightly as needed    Anemia, unspecified type  -     CBC with platelets differential; Future    Multiple thyroid nodules  -     TSH with free T4 reflex; Future    Screening for lipoid disorders  -     Lipid Profile FASTING; Future  -     Comprehensive metabolic panel; Future    --Pati Martinez MD

## 2020-09-10 DIAGNOSIS — E04.2 MULTIPLE THYROID NODULES: ICD-10-CM

## 2020-09-10 DIAGNOSIS — D64.9 ANEMIA, UNSPECIFIED TYPE: ICD-10-CM

## 2020-09-10 DIAGNOSIS — Z13.220 SCREENING FOR LIPOID DISORDERS: ICD-10-CM

## 2020-09-10 LAB
ALBUMIN SERPL-MCNC: 3.8 G/DL (ref 3.4–5)
ALP SERPL-CCNC: 72 U/L (ref 40–150)
ALT SERPL W P-5'-P-CCNC: 22 U/L (ref 0–50)
ANION GAP SERPL CALCULATED.3IONS-SCNC: 4 MMOL/L (ref 3–14)
AST SERPL W P-5'-P-CCNC: 13 U/L (ref 0–45)
BASOPHILS # BLD AUTO: 0.1 10E9/L (ref 0–0.2)
BASOPHILS NFR BLD AUTO: 1.9 %
BILIRUB SERPL-MCNC: 0.5 MG/DL (ref 0.2–1.3)
BUN SERPL-MCNC: 14 MG/DL (ref 7–30)
CALCIUM SERPL-MCNC: 9.2 MG/DL (ref 8.5–10.1)
CHLORIDE SERPL-SCNC: 109 MMOL/L (ref 94–109)
CHOLEST SERPL-MCNC: 195 MG/DL
CO2 SERPL-SCNC: 29 MMOL/L (ref 20–32)
CREAT SERPL-MCNC: 0.65 MG/DL (ref 0.52–1.04)
DIFFERENTIAL METHOD BLD: NORMAL
EOSINOPHIL # BLD AUTO: 0.2 10E9/L (ref 0–0.7)
EOSINOPHIL NFR BLD AUTO: 3.6 %
ERYTHROCYTE [DISTWIDTH] IN BLOOD BY AUTOMATED COUNT: 12.1 % (ref 10–15)
GFR SERPL CREATININE-BSD FRML MDRD: >90 ML/MIN/{1.73_M2}
GLUCOSE SERPL-MCNC: 100 MG/DL (ref 70–99)
HCT VFR BLD AUTO: 40.4 % (ref 35–47)
HDLC SERPL-MCNC: 73 MG/DL
HGB BLD-MCNC: 13.1 G/DL (ref 11.7–15.7)
IMM GRANULOCYTES # BLD: 0 10E9/L (ref 0–0.4)
IMM GRANULOCYTES NFR BLD: 0.5 %
LDLC SERPL CALC-MCNC: 109 MG/DL
LYMPHOCYTES # BLD AUTO: 1 10E9/L (ref 0.8–5.3)
LYMPHOCYTES NFR BLD AUTO: 23.3 %
MCH RBC QN AUTO: 32 PG (ref 26.5–33)
MCHC RBC AUTO-ENTMCNC: 32.4 G/DL (ref 31.5–36.5)
MCV RBC AUTO: 99 FL (ref 78–100)
MONOCYTES # BLD AUTO: 0.3 10E9/L (ref 0–1.3)
MONOCYTES NFR BLD AUTO: 7.6 %
NEUTROPHILS # BLD AUTO: 2.7 10E9/L (ref 1.6–8.3)
NEUTROPHILS NFR BLD AUTO: 63.1 %
NONHDLC SERPL-MCNC: 121 MG/DL
NRBC # BLD AUTO: 0 10*3/UL
NRBC BLD AUTO-RTO: 0 /100
PLATELET # BLD AUTO: 198 10E9/L (ref 150–450)
POTASSIUM SERPL-SCNC: 4.3 MMOL/L (ref 3.4–5.3)
PROT SERPL-MCNC: 7 G/DL (ref 6.8–8.8)
RBC # BLD AUTO: 4.1 10E12/L (ref 3.8–5.2)
SODIUM SERPL-SCNC: 142 MMOL/L (ref 133–144)
TRIGL SERPL-MCNC: 60 MG/DL
TSH SERPL DL<=0.005 MIU/L-ACNC: 3.77 MU/L (ref 0.4–4)
WBC # BLD AUTO: 4.2 10E9/L (ref 4–11)

## 2020-09-23 ENCOUNTER — MYC MEDICAL ADVICE (OUTPATIENT)
Dept: OBGYN | Facility: CLINIC | Age: 65
End: 2020-09-23

## 2020-09-23 DIAGNOSIS — N94.10 DYSPAREUNIA, FEMALE: ICD-10-CM

## 2020-09-24 ENCOUNTER — MYC MEDICAL ADVICE (OUTPATIENT)
Dept: OBGYN | Facility: CLINIC | Age: 65
End: 2020-09-24

## 2020-09-24 RX ORDER — OSPEMIFENE 60 MG/1
60 TABLET, FILM COATED ORAL EVERY MORNING
Qty: 90 TABLET | Refills: 4 | Status: SHIPPED | OUTPATIENT
Start: 2020-09-24 | End: 2021-02-24

## 2020-09-24 NOTE — TELEPHONE ENCOUNTER
I am assuming she uses estrace cream, estrogen cream.  Not tablet.  We can continue the cream.  She can definitely stop osphena, but I wouldn't want her on estrogen tablets.

## 2020-11-08 ENCOUNTER — HEALTH MAINTENANCE LETTER (OUTPATIENT)
Age: 65
End: 2020-11-08

## 2020-11-12 ENCOUNTER — VIRTUAL VISIT (OUTPATIENT)
Dept: ONCOLOGY | Facility: CLINIC | Age: 65
End: 2020-11-12
Attending: INTERNAL MEDICINE
Payer: COMMERCIAL

## 2020-11-12 DIAGNOSIS — C50.911 MALIGNANT NEOPLASM OF RIGHT BREAST IN FEMALE, ESTROGEN RECEPTOR POSITIVE, UNSPECIFIED SITE OF BREAST (H): Primary | ICD-10-CM

## 2020-11-12 DIAGNOSIS — Z17.0 MALIGNANT NEOPLASM OF RIGHT BREAST IN FEMALE, ESTROGEN RECEPTOR POSITIVE, UNSPECIFIED SITE OF BREAST (H): Primary | ICD-10-CM

## 2020-11-12 DIAGNOSIS — Z79.811 LONG TERM (CURRENT) USE OF AROMATASE INHIBITORS: ICD-10-CM

## 2020-11-12 DIAGNOSIS — M81.8 OTHER OSTEOPOROSIS, UNSPECIFIED PATHOLOGICAL FRACTURE PRESENCE: ICD-10-CM

## 2020-11-12 PROCEDURE — 99214 OFFICE O/P EST MOD 30 MIN: CPT | Mod: 95 | Performed by: INTERNAL MEDICINE

## 2020-11-12 NOTE — PROGRESS NOTES
"Namita Hernandez is a 65 year old female who is being evaluated via a billable telephone visit.      The patient has been notified of following:     \"This telephone visit will be conducted via a call between you and your physician/provider. We have found that certain health care needs can be provided without the need for a physical exam.  This service lets us provide the care you need with a short phone conversation.  If a prescription is necessary we can send it directly to your pharmacy.  If lab work is needed we can place an order for that and you can then stop by our lab to have the test done at a later time.    Telephone visits are billed at different rates depending on your insurance coverage. During this emergency period, for some insurers they may be billed the same as an in-person visit.  Please reach out to your insurance provider with any questions.    If during the course of the call the physician/provider feels a telephone visit is not appropriate, you will not be charged for this service.\"    Patient has given verbal consent for Telephone visit?  Yes    What phone number would you like to be contacted at? [phone]    How would you like to obtain your AVS? Jone Navarrete JULIO CESAR    Phone call duration: 15 minutes    Delphine Alegria MD    ONCOLOGY FOLLOW UP VISIT  November 12, 2020     Namita returns today for followup of a stage I, T1b N0, ER/UT-positive and HER2-negative breast cancer.      HISTORY OF PRESENT ILLNESS:   Namita is a 62-year-old woman who comes in today for followup for her breast cancer. Briefly, Namita underwent a mammogram and ultrasound. Mammogram on 04/06/2015 suggested a 1.5 cm abnormal distortion confirmed by ultrasound.  By contrast mammography, this revealed an 18 mm area of abnormality at the 2 o'clock position involving her right breast.  Biopsy was recommended and confirmed an invasive ductal carcinoma, grade 1, ER/UT-positive and HER2-negative.  She underwent a lumpectomy " and sentinel lymph node biopsy by Dr. Yoshi Oconnor.  Her final staging was a 6 mm tumor, T1b N0, ER/NH-positive and HER2-negative.  All margins were negative. She completed her RTx (52.4 Gy in 20 sessions 6/31-7/27). She started Tamoxifen in 09/2015. She switched to letrozole July 2018.     She is also on fosamax for bone health.    INTERVAL HISTORY:   She returns today in follow up and feels quite well. She has been primarily quarantined at home.      She is walking a neighbor's dog regularly.      She is tolerating letrozole well. At first, she noticed some body aches in the arms, but it resolved quickly. Since that time, she has had no arthralgias or myalgias, no sites of pain anywhere. No hot flashes.     She is active with exercise (walking, pilates, strength training). She eats healthy diet.       She completed 5 years in August 2020 and stopped letrozole at that time.      Her vaginal dryness has improved.  She is also on an estrogen cream that has helped.    Her 10-point review of systems is otherwise negative.         PHYSICAL EXAMINATION:  Deferred due to the covid pandemic  Speaking clearly, and fluently    Mammogram August 2020:   IMPRESSION: BI-RADS CATEGORY: 2 - Benign Finding(s).  RECOMMENDED FOLLOW-UP: Annual Mammography.    Reviewed labs from September 2020    ASSESSMENT AND PLAN:    This 62-year-old woman with a T1b N0, ER/NH-positive and HER2-negative invasive ductal carcinoma, grade 1, involving the right breast, status post lumpectomy and right breast RTx. She is now on Tamoxifen for adjuvant endocrine therapy.     1.  Breast cancer.  [id number], stage IA, start tamoxifen Sept 2015, switched to letrozole July 2018 with plan to complete at least 5 years of endocrine therapy. She is tolerating the AI without any issues.   -Annual mammogram done in August 2020, which was normal as above.   -She has completed five years of letrozole.    -We discussed having her remain off of letrozole now that she has  completed five years.      2. 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     - repeat dexa scan in 2021.  I advised she continue fosamax til then.  - Continue Ca++/vit D, strength exercises, as doing.     3. Diet and exercise. Reviewed goal for 150 minutes moderate intensity activity weekly. She is working on her diet with weight loss goal in place.     4. Vaginal dryness - she has tried estradiol 3x per week with minimal improvement.  She tried osphena and did not think this improved either.  Overall this is improving with being off of the AI and with her estrogen cream.    Delphine Alegria MD

## 2021-02-08 DIAGNOSIS — N95.2 ATROPHIC VAGINITIS: ICD-10-CM

## 2021-02-08 NOTE — TELEPHONE ENCOUNTER
Requested Prescriptions   Pending Prescriptions Disp Refills     estradiol (ESTRACE) 0.1 MG/GM vaginal cream 32 g 3     Sig: Place 2 g vaginally four times a week       There is no refill protocol information for this order        Last Written Prescription Date:  8/20/20  Last Fill Quantity: 32 g,  # refills: 3   Last office visit: 7/17/2020 with prescribing provider:  Dr Call   Future Office Visit:  None

## 2021-02-09 RX ORDER — ESTRADIOL 0.1 MG/G
2 CREAM VAGINAL
Qty: 32 G | Refills: 3 | Status: SHIPPED | OUTPATIENT
Start: 2021-02-09

## 2021-02-23 NOTE — PROGRESS NOTES
SUBJECTIVE:                                                   Namita Hernandez is a 66 year old female who presents to clinic today for the following health issue(s):  Patient presents with:  Abnormal Uterine Bleeding: patient states she noticed spotting since last Friday. Patient is post menopausal. Patient states she uses one panty liner a day. She also notice some pressures in the abdomen.         HPI:  She has had spotting since last Friday.  She notices it on the tissue paper.  She is wearing a panty liner.  She is having some pelvic pressure.    History of endometrial polyps     No LMP recorded. Patient is postmenopausal..     Patient is not sexually active, .  Using menopause for contraception.    reports that she quit smoking about 35 years ago. She smoked 0.00 packs per day. She has never used smokeless tobacco.    STD testing offered?  Declined    Health maintenance updated:  no, due for pap smear    Today's PHQ-2 Score:   PHQ-2 (  [employer]) 3/6/2020   Q1: Little interest or pleasure in doing things 0   Q2: Feeling down, depressed or hopeless 0   PHQ-2 Score 0     Today's PHQ-9 Score:   PHQ-9 SCORE 2018   PHQ-9 Total Score 2     Today's JL-7 Score:   JL-7 SCORE 2018   Total Score 0       Problem list and histories reviewed & adjusted, as indicated.  Additional history: as documented.    Patient Active Problem List   Diagnosis     Breast cancer, right breast (H)     Multiple thyroid nodules     Cervical adenopathy     Elbow pain, left     Right shoulder pain     Hypovitaminosis D     Senile osteoporosis     Parathyroid abnormality (H)     History of colonic polyps     Endometrial polyp     Past Surgical History:   Procedure Laterality Date     DILATION AND CURETTAGE, OPERATIVE HYSTEROSCOPY WITH MORCELLATOR, COMBINED N/A 2019    Procedure: HYSTEROSCOPY DILATION AND CURRETAGE WITH MYOSURE;  Surgeon: Sushila Melendez MD;  Location: [facility] OR     EXCISION BENIGN LESION  "COMPL Right     Abdominal lipoma removed      EYE SURGERY      strabismus     LUMPECTOMY BREAST WITH SENTINEL NODE, COMBINED Right 2015    Procedure: COMBINED LUMPECTOMY BREAST WITH SENTINEL NODE;  Surgeon: Yoshi Oconnor MD;  Location:  OR      Social History     Tobacco Use     Smoking status: Former Smoker     Packs/day: 0.00     Quit date: 1986     Years since quittin.1     Smokeless tobacco: Never Used   Substance Use Topics     Alcohol use: Not Currently      Problem (# of Occurrences) Relation (Name,Age of Onset)    Breast Cancer (2) Maternal Grandmother: late 50s, Mother: late 60s    Diabetes (1) Maternal Grandfather    Meniere's disease (1) Father    Thyroid Disease (1) Mother: nodlue       Negative family history of: Thyroid Cancer, Glaucoma, Macular Degeneration, Retinal detachment, Melanoma            Current Outpatient Medications   Medication Sig     alendronate (FOSAMAX) 70 MG tablet Take 1 tablet (70 mg) by mouth every 7 days     CALCIUM PO Take 2-4 tablets by mouth daily      cholecalciferol (VITAMIN D) 1000 UNIT tablet Take by mouth daily     estradiol (ESTRACE) 0.1 MG/GM vaginal cream Place 2 g vaginally four times a week     vitamin  B complex with vitamin C (VITAMIN  B COMPLEX) TABS Take 1 tablet by mouth daily     zolpidem (AMBIEN) 5 MG tablet Take 1 tablet (5 mg) by mouth nightly as needed     No current facility-administered medications for this visit.      Allergies   Allergen Reactions     Sulfa Drugs Hives       ROS:  12 point review of systems negative other than symptoms noted below or in the HPI.  Genitourinary: Irregular Menses  No urinary frequency or dysuria, bladder or kidney problems      OBJECTIVE:     /78 (BP Location: Right arm, Patient Position: Sitting, Cuff Size: Adult Regular)   Pulse 64   Ht 1.676 m (5' 6\")   Wt 72.2 kg (159 lb 3.2 oz)   BMI 25.70 kg/m    Body mass index is 25.7 kg/m .    Exam:  Constitutional:  Appearance: Well nourished, " well developed alert, in no acute distress     In-Clinic Test Results:  No results found for this or any previous visit (from the past 24 hour(s)).    ASSESSMENT/PLAN:                                                        ICD-10-CM    1. Postmenopausal bleeding  N95.0 US Transvaginal Non OB       There are no Patient Instructions on file for this visit.    1. Ultrasound ordered to assess the lining  2. From the ultrasound will determine if she needs a biopsy or further studies  3. Due for pap- obtained   4. Thyroid testing, had a test done in September that was high end of normal    Sushila Call MD  [facility] [place]

## 2021-02-24 ENCOUNTER — OFFICE VISIT (OUTPATIENT)
Dept: OBGYN | Facility: CLINIC | Age: 66
End: 2021-02-24
Payer: COMMERCIAL

## 2021-02-24 VITALS
HEART RATE: 64 BPM | HEIGHT: 66 IN | BODY MASS INDEX: 25.58 KG/M2 | SYSTOLIC BLOOD PRESSURE: 120 MMHG | WEIGHT: 159.2 LBS | DIASTOLIC BLOOD PRESSURE: 78 MMHG

## 2021-02-24 DIAGNOSIS — N95.0 POSTMENOPAUSAL BLEEDING: Primary | ICD-10-CM

## 2021-02-24 DIAGNOSIS — Z12.4 SCREENING FOR CERVICAL CANCER: ICD-10-CM

## 2021-02-24 LAB — TSH SERPL DL<=0.005 MIU/L-ACNC: 3.42 MU/L (ref 0.4–4)

## 2021-02-24 PROCEDURE — 36415 COLL VENOUS BLD VENIPUNCTURE: CPT | Performed by: OBSTETRICS & GYNECOLOGY

## 2021-02-24 PROCEDURE — 84443 ASSAY THYROID STIM HORMONE: CPT | Performed by: OBSTETRICS & GYNECOLOGY

## 2021-02-24 PROCEDURE — G0145 SCR C/V CYTO,THINLAYER,RESCR: HCPCS | Performed by: OBSTETRICS & GYNECOLOGY

## 2021-02-24 PROCEDURE — 87624 HPV HI-RISK TYP POOLED RSLT: CPT | Performed by: OBSTETRICS & GYNECOLOGY

## 2021-02-24 PROCEDURE — 99213 OFFICE O/P EST LOW 20 MIN: CPT | Performed by: OBSTETRICS & GYNECOLOGY

## 2021-02-24 ASSESSMENT — MIFFLIN-ST. JEOR: SCORE: 1283.88

## 2021-02-25 ENCOUNTER — TELEPHONE (OUTPATIENT)
Dept: OBGYN | Facility: CLINIC | Age: 66
End: 2021-02-25

## 2021-02-25 DIAGNOSIS — R39.9 UTI SYMPTOMS: ICD-10-CM

## 2021-02-25 DIAGNOSIS — R39.9 UTI SYMPTOMS: Primary | ICD-10-CM

## 2021-02-25 DIAGNOSIS — N39.0 UTI (URINARY TRACT INFECTION): ICD-10-CM

## 2021-02-25 LAB
ALBUMIN UR-MCNC: 30 MG/DL
APPEARANCE UR: CLEAR
BACTERIA #/AREA URNS HPF: ABNORMAL /HPF
BILIRUB UR QL STRIP: NEGATIVE
COLOR UR AUTO: YELLOW
GLUCOSE UR STRIP-MCNC: NEGATIVE MG/DL
HGB UR QL STRIP: ABNORMAL
KETONES UR STRIP-MCNC: NEGATIVE MG/DL
LEUKOCYTE ESTERASE UR QL STRIP: ABNORMAL
NITRATE UR QL: POSITIVE
NON-SQ EPI CELLS #/AREA URNS LPF: ABNORMAL /LPF
PH UR STRIP: 5.5 PH (ref 5–7)
RBC #/AREA URNS AUTO: ABNORMAL /HPF
SOURCE: ABNORMAL
SP GR UR STRIP: 1.02 (ref 1–1.03)
UROBILINOGEN UR STRIP-ACNC: 0.2 EU/DL (ref 0.2–1)
WBC #/AREA URNS AUTO: ABNORMAL /HPF

## 2021-02-25 PROCEDURE — 87086 URINE CULTURE/COLONY COUNT: CPT | Performed by: OBSTETRICS & GYNECOLOGY

## 2021-02-25 PROCEDURE — 87088 URINE BACTERIA CULTURE: CPT | Performed by: OBSTETRICS & GYNECOLOGY

## 2021-02-25 PROCEDURE — 87186 SC STD MICRODIL/AGAR DIL: CPT | Performed by: OBSTETRICS & GYNECOLOGY

## 2021-02-25 PROCEDURE — 81001 URINALYSIS AUTO W/SCOPE: CPT | Performed by: OBSTETRICS & GYNECOLOGY

## 2021-02-25 RX ORDER — CEPHALEXIN 500 MG/1
500 CAPSULE ORAL 2 TIMES DAILY
Qty: 14 CAPSULE | Refills: 0 | Status: SHIPPED | OUTPATIENT
Start: 2021-02-25 | End: 2021-03-04

## 2021-02-25 NOTE — TELEPHONE ENCOUNTER
Pt UA result completed. UC in process.    Routing to on-call provider for review/recommendation.    Rachel David RN on 2/25/2021 at 4:20 PM

## 2021-02-25 NOTE — TELEPHONE ENCOUNTER
"Has long UTI history, goes from \"zero to five\"  Was just in yesterday for OV with Dr. Call, vag bleeding.    Had something very spicy for dinner last night.  Was having painful bladder spasms, felt like she had to urinate all the time, barely slept.    Had some tolterodine left from last prescription.  Took one and had much improvement in a few hours.    At time of this call, symptoms are returning and now is concerned she may have a UTI in addition to spasms.    Last rx for tolterodine (DETROL) 1 MG tablet prescribed 11/21/2019, disp = 30, R = 3    Was never diagnosed with overactive bladder, but this was prescribed and pt stated it really helped with her spasms.    UA and UC ordered, pt will come in to leave urine.  Pt would like to restart the tolterodine if possible as she is having the spasms that she had back when it was prescribed in 2019.    Routing to provider for review/recommendations.    Rachel David RN on 2/25/2021 at 1:41 PM            "

## 2021-02-25 NOTE — TELEPHONE ENCOUNTER
Pt informed of Keflex antibiotics sent to pharmacy. Pt is very relieved as sx's have worsened some overnight. She verbalized understanding and will await UCx results as well.    Routed to Dr Call as REYNA - heads up on Urine Cx results  Pt being tx'd with Keflex now    Aracely Villatoro RN on 2/25/2021 at 4:28 PM

## 2021-02-25 NOTE — TELEPHONE ENCOUNTER
Patient calling with bladder spasms and painful and urgent urination. Also needs refill on tolterodine, which she states Dr Call last prescribed. Please return call to advise.

## 2021-02-26 LAB
BACTERIA SPEC CULT: ABNORMAL
Lab: ABNORMAL
SPECIMEN SOURCE: ABNORMAL

## 2021-03-01 DIAGNOSIS — N39.41 URGE INCONTINENCE: ICD-10-CM

## 2021-03-01 LAB
COPATH REPORT: NORMAL
PAP: NORMAL

## 2021-03-01 RX ORDER — TOLTERODINE TARTRATE 1 MG/1
1 TABLET, EXTENDED RELEASE ORAL 2 TIMES DAILY
Qty: 30 TABLET | Refills: 0 | Status: SHIPPED | OUTPATIENT
Start: 2021-03-01 | End: 2021-04-19

## 2021-03-01 NOTE — TELEPHONE ENCOUNTER
Prescription approved per [facility] Refill Protocol.  Aracely Villatoro RN on 3/1/2021 at 3:37 PM

## 2021-03-01 NOTE — TELEPHONE ENCOUNTER
"Requested Prescriptions   Pending Prescriptions Disp Refills     tolterodine (DETROL) 1 MG tablet 30 tablet 3     Sig: Take 1 tablet (1 mg) by mouth 2 times daily       Muscarinic Antagonists (Urinary Incontinence Agents) Failed - 3/1/2021  3:30 PM        Failed - Medication is active on med list        Passed - Recent (12 mo) or future (30 days) visit within the authorizing provider's specialty     Patient has had an office visit with the authorizing provider or a provider within the authorizing providers department within the previous 12 mos or has a future within next 30 days. See \"Patient Info\" tab in inbasket, or \"Choose Columns\" in Meds & Orders section of the refill encounter.              Passed - Patient does not have a diagnosis of glaucoma on the problem list     If glaucoma diagnosis is new, refer refill to physician.          Passed - Patient is 18 years of age or older           Last Written Prescription Date:  11/21/19  Last Fill Quantity: 30,  # refills: 3   Last office visit: 2/24/2021 with prescribing provider:  Dr Sushila Call   Future Office Visit:   Next 5 appointments (look out 90 days)    Mar 03, 2021  3:30 PM  Office Visit with Sushila Call MD  [facility] [place] ([facility] [place] ) [address] [address] [phone]                 "

## 2021-03-02 LAB
FINAL DIAGNOSIS: NORMAL
HPV HR 12 DNA CVX QL NAA+PROBE: NEGATIVE
HPV16 DNA SPEC QL NAA+PROBE: NEGATIVE
HPV18 DNA SPEC QL NAA+PROBE: NEGATIVE
SPECIMEN DESCRIPTION: NORMAL
SPECIMEN SOURCE CVX/VAG CYTO: NORMAL

## 2021-03-03 ENCOUNTER — OFFICE VISIT (OUTPATIENT)
Dept: OBGYN | Facility: CLINIC | Age: 66
End: 2021-03-03
Attending: OBSTETRICS & GYNECOLOGY
Payer: COMMERCIAL

## 2021-03-03 ENCOUNTER — ANCILLARY PROCEDURE (OUTPATIENT)
Dept: ULTRASOUND IMAGING | Facility: CLINIC | Age: 66
End: 2021-03-03
Attending: OBSTETRICS & GYNECOLOGY
Payer: COMMERCIAL

## 2021-03-03 VITALS — BODY MASS INDEX: 25.02 KG/M2 | WEIGHT: 155 LBS | SYSTOLIC BLOOD PRESSURE: 120 MMHG | DIASTOLIC BLOOD PRESSURE: 82 MMHG

## 2021-03-03 DIAGNOSIS — N32.89 BLADDER SPASM: ICD-10-CM

## 2021-03-03 DIAGNOSIS — N95.0 POSTMENOPAUSAL BLEEDING: Primary | ICD-10-CM

## 2021-03-03 DIAGNOSIS — R30.0 DYSURIA: ICD-10-CM

## 2021-03-03 PROCEDURE — 76830 TRANSVAGINAL US NON-OB: CPT | Performed by: OBSTETRICS & GYNECOLOGY

## 2021-03-03 PROCEDURE — 99213 OFFICE O/P EST LOW 20 MIN: CPT | Performed by: OBSTETRICS & GYNECOLOGY

## 2021-03-03 RX ORDER — CEPHALEXIN 500 MG/1
500 CAPSULE ORAL 2 TIMES DAILY
Qty: 14 CAPSULE | Refills: 3 | Status: SHIPPED | OUTPATIENT
Start: 2021-03-03 | End: 2021-04-19

## 2021-03-03 RX ORDER — PHENAZOPYRIDINE HYDROCHLORIDE 95 MG/1
95 TABLET ORAL 3 TIMES DAILY
Qty: 12 TABLET | Refills: 4 | Status: SHIPPED | OUTPATIENT
Start: 2021-03-03 | End: 2021-04-19

## 2021-03-03 NOTE — PROGRESS NOTES
SUBJECTIVE:                                                   Namita Hernandez is a 66 year old female who presents to clinic today for the following health issue(s):  Patient presents with:  Ultrasound: f/u to PMB    HPI:  Patient had ultrasound which was normal.  She had possible post-menopausal bleeding which may have been spotting on tissue from bladder infection.  After being seen had significant bladder concerns.  No further vaginal bleeding.    Developed significant urge, and bladder pain after initial presentation.  She used the antibiotics. No fevers or chills.      No LMP recorded. Patient is postmenopausal..     Patient is sexually active, .  Using menopause for contraception.    reports that she quit smoking about 35 years ago. She smoked 0.00 packs per day. She has never used smokeless tobacco.    STD testing offered?  Declined    Health maintenance updated:  yes    Today's PHQ-2 Score:   PHQ-2 (  [employer]) 3/6/2020   Q1: Little interest or pleasure in doing things 0   Q2: Feeling down, depressed or hopeless 0   PHQ-2 Score 0     Today's PHQ-9 Score:   PHQ-9 SCORE 2018   PHQ-9 Total Score 2     Today's JL-7 Score:   JL-7 SCORE 2018   Total Score 0       Problem list and histories reviewed & adjusted, as indicated.  Additional history: as documented.    Patient Active Problem List   Diagnosis     Breast cancer, right breast (H)     Multiple thyroid nodules     Cervical adenopathy     Elbow pain, left     Right shoulder pain     Hypovitaminosis D     Senile osteoporosis     Parathyroid abnormality (H)     History of colonic polyps     Endometrial polyp     Past Surgical History:   Procedure Laterality Date     DILATION AND CURETTAGE, OPERATIVE HYSTEROSCOPY WITH MORCELLATOR, COMBINED N/A 2019    Procedure: HYSTEROSCOPY DILATION AND CURRETAGE WITH MYOSURE;  Surgeon: Sushila Melendez MD;  Location: [facility] OR     EXCISION BENIGN LESION COMPL Right 1997    Abdominal  lipoma removed      EYE SURGERY      strabismus     LUMPECTOMY BREAST WITH SENTINEL NODE, COMBINED Right 2015    Procedure: COMBINED LUMPECTOMY BREAST WITH SENTINEL NODE;  Surgeon: Yoshi Oconnor MD;  Location:  OR      Social History     Tobacco Use     Smoking status: Former Smoker     Packs/day: 0.00     Quit date: 1986     Years since quittin.1     Smokeless tobacco: Never Used   Substance Use Topics     Alcohol use: Not Currently      Problem (# of Occurrences) Relation (Name,Age of Onset)    Breast Cancer (2) Maternal Grandmother: late 50s, Mother: late 60s    Diabetes (1) Maternal Grandfather    Meniere's disease (1) Father    Thyroid Disease (1) Mother: nodlue       Negative family history of: Thyroid Cancer, Glaucoma, Macular Degeneration, Retinal detachment, Melanoma            Current Outpatient Medications   Medication Sig     alendronate (FOSAMAX) 70 MG tablet Take 1 tablet (70 mg) by mouth every 7 days     CALCIUM PO Take 2-4 tablets by mouth daily      cephALEXin (KEFLEX) 500 MG capsule Take 1 capsule (500 mg) by mouth 2 times daily for 7 days     cholecalciferol (VITAMIN D) 1000 UNIT tablet Take by mouth daily     estradiol (ESTRACE) 0.1 MG/GM vaginal cream Place 2 g vaginally four times a week     tolterodine (DETROL) 1 MG tablet Take 1 tablet (1 mg) by mouth 2 times daily     vitamin  B complex with vitamin C (VITAMIN  B COMPLEX) TABS Take 1 tablet by mouth daily     zolpidem (AMBIEN) 5 MG tablet Take 1 tablet (5 mg) by mouth nightly as needed     No current facility-administered medications for this visit.      Allergies   Allergen Reactions     Sulfa Drugs Hives       ROS:  12 point review of systems negative other than symptoms noted below or in the HPI.  No urinary frequency or dysuria, bladder or kidney problems      OBJECTIVE:     /82   Wt 70.3 kg (155 lb)   Breastfeeding No   BMI 25.02 kg/m    Body mass index is 25.02 kg/m .    Exam:  Constitutional:   Appearance: Well nourished, well developed alert, in no acute distress     In-Clinic Test Results:  Results for orders placed or performed in visit on 02/24/21 (from the past 24 hour(s))   US Transvaginal Non OB    Narrative    Gynecological Ultrasound Report  Pelvic U/S - Transvaginal  [facility]  Referring Provider: Sushila Melendez MD  Sonographer:  Tamela Batista RDMS  Indication: Bleeding/Menses- Postmenopausal bleeding  LMP: No LMP recorded. Patient is postmenopausal.  History:   Gynecological Ultrasonography:   Uterus: anteverted. Contour is smooth/regular.  Size: 4.45 x 3.66 x 2.77 cm  Endometrium: Thickness Total 2.12 mm  Findings: Thin  Right Ovary: 2.29 x 1.54 x 1.16 cm. Wnl  Left Ovary: 2.33 x 1.75 x 1.13 cm. Wnl  Cul de Sac Free Fluid: No free fluid     Impression:    normal pelvic ultrasound    Sushila Call MD             ASSESSMENT/PLAN:                                                        ICD-10-CM    1. Postmenopausal bleeding  N95.0        There are no Patient Instructions on file for this visit.    1. Ultrasound was normal  2. UTI- likely the spotting on tissue was associated with the UTI.  3. She is going to call with any other bleeding  4. Other bleeding in the future will consider biopsy.  5. Sent azo over  6. Sent keflex over if she is having significant issues and can't come in to leave sample.  Discussed with covid and other concerns will have her have the keflex available.  Discussed that culture is better to make sure that we are treating appropriately.      Sushila Call MD  [facility] [place]

## 2021-03-07 ENCOUNTER — NURSE TRIAGE (OUTPATIENT)
Dept: NURSING | Facility: CLINIC | Age: 66
End: 2021-03-07

## 2021-03-07 NOTE — TELEPHONE ENCOUNTER
Patient was treated for a UTI about 10 days ago. She was on a 7 day course of antibiotic, last dose was on Thursday. Patient felt that symptoms were improved on antibiotic, and today she feels that symptoms are returning. Patient reports having some mild pain with urination. She denies any fever, abdominal pain or lower back pain.  Patient is able to urinate fine. Patient reports that she does get frequent UTI's.  Patient does have phenazopyridine to use as needed for pain.     Patient is advised on evaluation in the next 24 hours. Patient will see how today goes, if symptoms are worsening she will go into urgent care, otherwise will try to be seen in clinic tomorrow. Patient denies further questions or concerns at this time.    Lanie Santiago RN  [facility]      Additional Information    Negative: Shock suspected (e.g., cold/pale/clammy skin, too weak to stand, low BP, rapid pulse)    Negative: Sounds like a life-threatening emergency to the triager    Negative: Followed a genital area injury    Negative: Taking antibiotic for urinary tract infection (UTI)    Negative: Pregnant    Negative: Postpartum (from 0 to 6 weeks after delivery)    Negative: [1] Unable to urinate (or only a few drops) > 4 hours AND [2] bladder feels very full (e.g., palpable bladder or strong urge to urinate)    Negative: Vomiting    Negative: Patient sounds very sick or weak to the triager    Negative: [1] SEVERE pain with urination  (e.g., excruciating) AND [2] not improved after 2 hours of pain medicine and Sitz bath    Negative: Fever > 100.5 F (38.1 C)    Negative: Side (flank) or lower back pain present    Negative: Diabetes mellitus or weak immune system (e.g., HIV positive, cancer chemo, splenectomy, organ transplant, chronic steroids)    Negative: Bedridden (e.g., nursing home patient, CVA, chronic illness, recovering from surgery)    Negative: Artificial heart valve or artificial joint    Age > 50 years     Negative: Unusual vaginal discharge (e.g., bad smelling, yellow, green, or foamy-white)    Negative: Patient is worried about sexually transmitted disease (STD)    Negative: Possibility of pregnancy    Negative: Blood in urine (red, pink, or tea-colored)    Protocols used: URINATION PAIN - FEMALE-A-AH    COVID 19 Nurse Triage Plan/Patient Instructions    Please be aware that novel coronavirus (COVID-19) may be circulating in the community. If you develop symptoms such as fever, cough, or SOB or if you have concerns about the presence of another infection including coronavirus (COVID-19), please contact your health care provider or visit [email or web address].     Disposition/Instructions    In-Person Visit with provider recommended. Reference Visit Selection Guide.    Thank you for taking steps to prevent the spread of this virus.  o Limit your contact with others.  o Wear a simple mask to cover your cough.  o Wash your hands well and often.    Resources    [facility]: About COVID-19: [email or web address]    CDC: What to Do If You're Sick: www.cdc.gov/coronavirus/2019-ncov/about/steps-when-sick.html    CDC: Ending Home Isolation: www.cdc.gov/coronavirus/2019-ncov/hcp/disposition-in-home-patients.html     CDC: Caring for Someone: www.cdc.gov/coronavirus/2019-ncov/if-you-are-sick/care-for-someone.html     [facility]: Interim Guidance for Hospital Discharge to Home: www.health.[facility].[place].us/diseases/coronavirus/hcp/hospdischarge.pdf    [facility] clinical trials (COVID-19 research studies): [email or web address]     Below are the COVID-19 hotlines at the [place] Department of Health ([facility]). Interpreters are available.   o For health questions: Call 155-662-3173 or 1-164.800.8919 (7 a.m. to 7 p.m.)  o For questions about schools and childcare: Call 446-532-3320 or 1-344.931.1054 (7 a.m. to 7 p.m.)

## 2021-03-08 ENCOUNTER — TELEPHONE (OUTPATIENT)
Dept: OBGYN | Facility: CLINIC | Age: 66
End: 2021-03-08

## 2021-03-08 DIAGNOSIS — R39.9 UTI SYMPTOMS: Primary | ICD-10-CM

## 2021-03-08 DIAGNOSIS — R39.9 UTI SYMPTOMS: ICD-10-CM

## 2021-03-08 PROCEDURE — 87086 URINE CULTURE/COLONY COUNT: CPT | Performed by: OBSTETRICS & GYNECOLOGY

## 2021-03-08 NOTE — TELEPHONE ENCOUNTER
Just finished abx for UTI last Thursday  Painful urination started yesterday morning again  Taking the rx AZO given to her by Dr Call  Asking to repeat UA/UCx    UCx ordered only d/t AZO  Push fluids and monitor sx's. Apt for urine drop off today and aware she will wait 24-48hrs for results.    Pt verbalized understanding, in agreement with plan, and voiced no further questions.  Aracely Villatoro RN on 3/8/2021 at 8:59 AM

## 2021-03-09 ENCOUNTER — TRANSFERRED RECORDS (OUTPATIENT)
Dept: HEALTH INFORMATION MANAGEMENT | Facility: CLINIC | Age: 66
End: 2021-03-09

## 2021-03-09 ENCOUNTER — NURSE TRIAGE (OUTPATIENT)
Dept: NURSING | Facility: CLINIC | Age: 66
End: 2021-03-09

## 2021-03-09 LAB
BACTERIA SPEC CULT: NORMAL
Lab: NORMAL
SPECIMEN SOURCE: NORMAL

## 2021-03-09 NOTE — TELEPHONE ENCOUNTER
Called pt to f/u  UCx is pending - do not need a new sample. The UA is the test affected by AZO/pyridium. Will take 24-48 hours to result.  Pt feeling miserable and feels she is getting worse.    Recommended being seen - could see if NP or midwife has apts or recommended Urgent care or urgency room. Pt will wait for UCx but also be seen in urgency room.    Take Tyl/ibu for discomfort. Push fluids. Pt verbalized understanding, in agreement with plan, and voiced no further questions.  Aracely Villatoro RN on 3/9/2021 at 8:22 AM

## 2021-03-09 NOTE — TELEPHONE ENCOUNTER
"Clinic Action Needed:Yes, Please call patient [phone] (home)     Reason for Call:  Patient reporting she dropped urine sample yesterday and is awaiting culture results. Requesting treatment as soon as possible.    Reporting she completed antibiotics for UTI symptoms on 3/4/21.  Symptoms returned again on 3/7/21.  Reporting increased urinary frequency, dysuria this morning. New onset of blood tinged urine when wiping. Reporting small clots. Afebrile.  Denies change in back pain \"I always have.\"     Patient is concerned that she took Uri stat prior to urine sample yesterday, questioning if she should have new sample.     Holly Gross RN  [facility]        Additional Information    Negative: Shock suspected (e.g., cold/pale/clammy skin, too weak to stand, low BP, rapid pulse)    Negative: Sounds like a life-threatening emergency to the triager    Negative: Urinary catheter, questions about    Negative: Recent back or abdominal injury    Negative: Recent genital injury    Negative: [1] Unable to urinate (or only a few drops) > 4 hours AND [2] bladder feels very full (e.g., palpable bladder or strong urge to urinate)    Negative: Passing pure blood or large blood clots (i.e., size > a dime) (Exception: onur or small strands)    Negative: Fever > 100.5 F (38.1 C)    Negative: Patient sounds very sick or weak to the triager    Negative: Known sickle cell disease    Negative: Taking Coumadin (warfarin) or other strong blood thinner, or known bleeding disorder (e.g., thrombocytopenia)    Negative: Side (flank) or back pain present    Pain or burning with passing urine    Protocols used: URINE - BLOOD IN-A-AH      "

## 2021-03-12 ENCOUNTER — TRANSFERRED RECORDS (OUTPATIENT)
Dept: HEALTH INFORMATION MANAGEMENT | Facility: CLINIC | Age: 66
End: 2021-03-12

## 2021-03-12 ENCOUNTER — MYC MEDICAL ADVICE (OUTPATIENT)
Dept: OBGYN | Facility: CLINIC | Age: 66
End: 2021-03-12

## 2021-03-12 NOTE — TELEPHONE ENCOUNTER
Discussed with patient that she should call the urgency room for results. Let her know they call patients with results. Test was completed there and they should be the one's to disclose info. Let her know our providers are separate entity. Was my understanding she wanted  to have info for future. Discussed with her that I was able to see results and they indeed should be the ones informing her of these results and if an alternative treatment is needed.     KAREN Hull

## 2021-03-17 ENCOUNTER — OFFICE VISIT (OUTPATIENT)
Dept: DERMATOLOGY | Facility: CLINIC | Age: 66
End: 2021-03-17
Payer: COMMERCIAL

## 2021-03-17 DIAGNOSIS — Z87.898 HISTORY OF ATYPICAL NEVUS: ICD-10-CM

## 2021-03-17 DIAGNOSIS — L82.1 SEBORRHEIC KERATOSIS: Primary | ICD-10-CM

## 2021-03-17 DIAGNOSIS — D18.01 CHERRY ANGIOMA: ICD-10-CM

## 2021-03-17 DIAGNOSIS — D22.9 MULTIPLE BENIGN NEVI: ICD-10-CM

## 2021-03-17 DIAGNOSIS — Z85.828 HISTORY OF NONMELANOMA SKIN CANCER: ICD-10-CM

## 2021-03-17 DIAGNOSIS — L82.0 INFLAMED SEBORRHEIC KERATOSIS: ICD-10-CM

## 2021-03-17 DIAGNOSIS — L81.4 SOLAR LENTIGO: ICD-10-CM

## 2021-03-17 PROCEDURE — 17110 DESTRUCTION B9 LES UP TO 14: CPT | Performed by: DERMATOLOGY

## 2021-03-17 PROCEDURE — 99213 OFFICE O/P EST LOW 20 MIN: CPT | Mod: 25 | Performed by: DERMATOLOGY

## 2021-03-17 RX ORDER — NITROFURANTOIN 25; 75 MG/1; MG/1
CAPSULE ORAL
COMMUNITY
Start: 2021-03-12 | End: 2021-04-19

## 2021-03-17 ASSESSMENT — PAIN SCALES - GENERAL: PAINLEVEL: NO PAIN (0)

## 2021-03-17 NOTE — PROGRESS NOTES
[facility] Dermatology Note  Encounter Date: Mar 17, 2021  Office Visit     Dermatology Problem List:  1. Hx NMSC  - BCC, left posterior shoulder, s/p excision 7/16/2018   2. ISK. LiqN2.   3. Hx mildly atypical nevus, right lower posterior leg. S/p shave bx 7/31/19  4. Relevant PMHx: hx breast cancer, R breast, s/p lumpectomy, chemoradiation.   ____________________________________________    Assessment & Plan:     # Inflamed SK, R posterior shoulder x 1.   - Cryotherapy performed today (see procedure note(s) below).     # History of NMSC, left posterior shoulder, s/p excision 7/16/18. NERD.   - Sun protection: Counseled SPF30+ sunscreen, UPF clothing, sun avoidance, tanning bed avoidance.  - Continue annual skin examinations     #Hx atypical nevus. NERD.   - Sun protection: Counseled SPF30+ sunscreen, UPF clothing, sun avoidance, tanning bed avoidance.  - Continue annual skin examinations     #Benign lesions: Multiple benign nevi, solar lentigos, seborrheic keratoses, cherry angiomas. Explained to patient benign nature of lesion. No treatment is necessary at this time unless the lesion changes or becomes symptomatic.     - ABCDs of melanoma were discussed and self skin checks were advised.  - Sun precaution was advised including the use of sun screens of SPF 30 or higher, sun protective clothing, and avoidance of tanning beds.    Procedures Performed:   - Cryotherapy procedure note, location(s): right posterior shoulder x 1. After verbal consent and discussion of risks and benefits including, but not limited to, dyspigmentation/scar, blister, and pain, 1 lesion(s) was(were) treated with 1-2 mm freeze border for 1-2 cycles with liquid nitrogen. Post cryotherapy instructions were provided.    Follow-up: 1 year(s) in-person, or earlier for new or changing lesions    Staff:     Devin Knott MD  Pronouns: he/him/his    Department of Dermatology  [facility]  [facility]: Phone: [phone], Fax:[phone]  [facility]: Phone: [phone] Fax: [phone]    ____________________________________________    CC: Skin Check (Ju is here today for a skin check. She is concerned about a few spots on her back.)    HPI:  Ms. Namita Hernandez is a(n) 66 year old female who presents today as a return patient for full body skin examination. Last seen 7/31/19 when had biopsy of a mildly atypical nevus on the right leg.     Today, she reports a few itchy brown spots on the back, particularly one spot on the right posterior shoulder that rubs on clothing. The patient otherwise denies any new or concerning lesions. No bleeding, painful, pruritic, or changing lesions. Health otherwise stable. No other skin concerns.     Patient is otherwise feeling well, without additional skin concerns.     Labs Reviewed:  N/A    Physical Exam:  Vitals: There were no vitals taken for this visit.  SKIN: Full skin, which includes the head/face, both arms, chest, back, abdomen,both legs, genitalia and/or groin buttocks, digits and/or nails, was examined.  - Brown macules on sun exposed areas  - Brown waxy, stuck-on appearing papules on face, trunk, and extremities, including excoriated papule with peripheral rim of erythema on the right posterior shoulder x 1  - Brown macules and papules on trunk and extremities without concerning dermoscopy features  - Red vascular papules on face, trunk and extremities.   - Well healed scars at prior skin cancer and atypical nevi surgical sites without evidence of recurrence.   - No other lesions of concern on areas examined.     Medications:  Current Outpatient Medications   Medication     alendronate (FOSAMAX) 70 MG tablet     CALCIUM PO     cholecalciferol (VITAMIN D) 1000 UNIT tablet     estradiol (ESTRACE) 0.1 MG/GM vaginal cream     nitroFURantoin macrocrystal-monohydrate (MACROBID) 100 MG capsule      vitamin  B complex with vitamin C (VITAMIN  B COMPLEX) TABS     zolpidem (AMBIEN) 5 MG tablet     cephALEXin (KEFLEX) 500 MG capsule     phenazopyridine (AZO URINARY PAIN RELIEF) 95 MG tablet     tolterodine (DETROL) 1 MG tablet     No current facility-administered medications for this visit.       Past Medical History:   Patient Active Problem List   Diagnosis     Breast cancer, right breast (H)     Multiple thyroid nodules     Cervical adenopathy     Elbow pain, left     Right shoulder pain     Hypovitaminosis D     Senile osteoporosis     Parathyroid abnormality (H)     History of colonic polyps     Endometrial polyp     Past Medical History:   Diagnosis Date     Breast cancer (H) 4/2015     Colon polyps      Hypovitaminosis D      Kidney stone 2010     Multiple thyroid nodules 2015    right dominant 1.3 cm; benign cytology 5/15     Osteoporosis 7/15    lowest T-score -3.2 33% radius     Pancreatic divisum      Recurrent UTI     since menopause (age 47) 3 x per year

## 2021-03-17 NOTE — LETTER
3/17/2021       RE: Namita Hernandez  [address] [place] [address]     Dear Colleague,    Thank you for referring your patient, Namita Hernandez, to the [facility] [place] [facility]. Please see a copy of my visit note below.    [facility] Dermatology Note  Encounter Date: Mar 17, 2021  Office Visit     Dermatology Problem List:  1. Hx NMSC  - BCC, left posterior shoulder, s/p excision 7/16/2018   2. ISK. LiqN2.   3. Hx mildly atypical nevus, right lower posterior leg. S/p shave bx 7/31/19  4. Relevant PMHx: hx breast cancer, R breast, s/p lumpectomy, chemoradiation.   ____________________________________________    Assessment & Plan:     # Inflamed SK, R posterior shoulder x 1.   - Cryotherapy performed today (see procedure note(s) below).     # History of NMSC, left posterior shoulder, s/p excision 7/16/18. NERD.   - Sun protection: Counseled SPF30+ sunscreen, UPF clothing, sun avoidance, tanning bed avoidance.  - Continue annual skin examinations     #Hx atypical nevus. NERD.   - Sun protection: Counseled SPF30+ sunscreen, UPF clothing, sun avoidance, tanning bed avoidance.  - Continue annual skin examinations     #Benign lesions: Multiple benign nevi, solar lentigos, seborrheic keratoses, cherry angiomas. Explained to patient benign nature of lesion. No treatment is necessary at this time unless the lesion changes or becomes symptomatic.     - ABCDs of melanoma were discussed and self skin checks were advised.  - Sun precaution was advised including the use of sun screens of SPF 30 or higher, sun protective clothing, and avoidance of tanning beds.    Procedures Performed:   - Cryotherapy procedure note, location(s): right posterior shoulder x 1. After verbal consent and discussion of risks and benefits including, but not limited to, dyspigmentation/scar, blister, and pain, 1 lesion(s) was(were) treated with 1-2 mm  freeze border for 1-2 cycles with liquid nitrogen. Post cryotherapy instructions were provided.    Follow-up: 1 year(s) in-person, or earlier for new or changing lesions    Staff:     Devin Knott MD  Pronouns: he/him/his    Department of Dermatology  [facility]: Phone: [phone], Fax:[phone]  [facility]: Phone: [phone] Fax: [phone]    ____________________________________________    CC: Skin Check (Ju is here today for a skin check. She is concerned about a few spots on her back.)    HPI:  Ms. Namita Hernandez is a(n) 66 year old female who presents today as a return patient for full body skin examination. Last seen 7/31/19 when had biopsy of a mildly atypical nevus on the right leg.     Today, she reports a few itchy brown spots on the back, particularly one spot on the right posterior shoulder that rubs on clothing. The patient otherwise denies any new or concerning lesions. No bleeding, painful, pruritic, or changing lesions. Health otherwise stable. No other skin concerns.     Patient is otherwise feeling well, without additional skin concerns.     Labs Reviewed:  N/A    Physical Exam:  Vitals: There were no vitals taken for this visit.  SKIN: Full skin, which includes the head/face, both arms, chest, back, abdomen,both legs, genitalia and/or groin buttocks, digits and/or nails, was examined.  - Brown macules on sun exposed areas  - Brown waxy, stuck-on appearing papules on face, trunk, and extremities, including excoriated papule with peripheral rim of erythema on the right posterior shoulder x 1  - Brown macules and papules on trunk and extremities without concerning dermoscopy features  - Red vascular papules on face, trunk and extremities.   - Well healed scars at prior skin cancer and atypical nevi surgical sites without evidence of recurrence.   - No other lesions of  concern on areas examined.     Medications:  Current Outpatient Medications   Medication     alendronate (FOSAMAX) 70 MG tablet     CALCIUM PO     cholecalciferol (VITAMIN D) 1000 UNIT tablet     estradiol (ESTRACE) 0.1 MG/GM vaginal cream     nitroFURantoin macrocrystal-monohydrate (MACROBID) 100 MG capsule     vitamin  B complex with vitamin C (VITAMIN  B COMPLEX) TABS     zolpidem (AMBIEN) 5 MG tablet     cephALEXin (KEFLEX) 500 MG capsule     phenazopyridine (AZO URINARY PAIN RELIEF) 95 MG tablet     tolterodine (DETROL) 1 MG tablet     No current facility-administered medications for this visit.       Past Medical History:   Patient Active Problem List   Diagnosis     Breast cancer, right breast (H)     Multiple thyroid nodules     Cervical adenopathy     Elbow pain, left     Right shoulder pain     Hypovitaminosis D     Senile osteoporosis     Parathyroid abnormality (H)     History of colonic polyps     Endometrial polyp     Past Medical History:   Diagnosis Date     Breast cancer (H) 4/2015     Colon polyps      Hypovitaminosis D      Kidney stone 2010     Multiple thyroid nodules 2015    right dominant 1.3 cm; benign cytology 5/15     Osteoporosis 7/15    lowest T-score -3.2 33% radius     Pancreatic divisum      Recurrent UTI     since menopause (age 47) 3 x per year

## 2021-03-17 NOTE — NURSING NOTE
Dermatology Rooming Note    Namita Hernandez's goals for this visit include:   Chief Complaint   Patient presents with     Skin Check     Ju is here today for a skin check. She is concerned about a few spots on her back.     Frankie Zimmer, CMA

## 2021-04-19 ENCOUNTER — VIRTUAL VISIT (OUTPATIENT)
Dept: INTERNAL MEDICINE | Facility: CLINIC | Age: 66
End: 2021-04-19
Payer: COMMERCIAL

## 2021-04-19 DIAGNOSIS — N95.2 ATROPHIC VAGINITIS: Primary | ICD-10-CM

## 2021-04-19 DIAGNOSIS — Z87.440 PERSONAL HISTORY OF URINARY TRACT INFECTION: ICD-10-CM

## 2021-04-19 PROCEDURE — 99215 OFFICE O/P EST HI 40 MIN: CPT | Mod: 95 | Performed by: INTERNAL MEDICINE

## 2021-04-19 RX ORDER — ESTRADIOL 10 UG/1
10 INSERT VAGINAL
Qty: 36 TABLET | Refills: 3 | Status: SHIPPED | OUTPATIENT
Start: 2021-04-19 | End: 2022-02-08

## 2021-04-19 RX ORDER — CIPROFLOXACIN 250 MG/1
250 TABLET, FILM COATED ORAL 2 TIMES DAILY
Qty: 6 TABLET | Refills: 3 | Status: SHIPPED | OUTPATIENT
Start: 2021-04-19 | End: 2021-04-22

## 2021-04-19 NOTE — PROGRESS NOTES
Ju is a very pleasant 66 year old who is being evaluated via a billable video visit.        Subjective   Ju is a 66 year old who presents for the following health issues:  a past medical history of Breast cancer (H) (4/2015), Colon polyps, Hypovitaminosis D, Kidney stone (2010), Multiple thyroid nodules (2015), Osteoporosis (7/15), Pancreatic divisum, and Recurrent UTI.       Current Outpatient Medications:      alendronate (FOSAMAX) 70 MG tablet, Take 1 tablet (70 mg) by mouth every 7 days, Disp: 12 tablet, Rfl: 3     CALCIUM PO, Take 2-4 tablets by mouth daily , Disp: , Rfl:      cholecalciferol (VITAMIN D) 1000 UNIT tablet, Take by mouth daily, Disp: 30 tablet, Rfl:      estradiol (ESTRACE) 0.1 MG/GM vaginal cream, Place 2 g vaginally four times a week, Disp: 32 g, Rfl: 3     vitamin  B complex with vitamin C (VITAMIN  B COMPLEX) TABS, Take 1 tablet by mouth daily, Disp: , Rfl:      zolpidem (AMBIEN) 5 MG tablet, Take 1 tablet (5 mg) by mouth nightly as needed, Disp: 60 tablet, Rfl: 3     cephALEXin (KEFLEX) 500 MG capsule, Take 1 capsule (500 mg) by mouth 2 times daily (Patient not taking: Reported on 3/17/2021), Disp: 14 capsule, Rfl: 3     nitroFURantoin macrocrystal-monohydrate (MACROBID) 100 MG capsule, , Disp: , Rfl:      phenazopyridine (AZO URINARY PAIN RELIEF) 95 MG tablet, Take 1 tablet (95 mg) by mouth 3 times daily (Patient not taking: Reported on 3/17/2021), Disp: 12 tablet, Rfl: 4     tolterodine (DETROL) 1 MG tablet, Take 1 tablet (1 mg) by mouth 2 times daily (Patient not taking: Reported on 3/17/2021), Disp: 30 tablet, Rfl: 0    HPI     Some concerning symptoms lately:   1.  Recurrent UTI, with very severe burning and pain, been going on for some time.  Symptoms occur more often after menopause, including dysuria, hematuria.  She's had some issues with coordination of care where she will drop off a urine sample at an urgent care but they will wait for the culture result which is often 2-4 days  after to start antibiotics.  She is interested in first, prevention but second, could she have a prescription on hand to start after contacting our clinic and dropping off a urine sample to try and expedite care.  I reviewed multiple past urine cultures and they are typically e coli, resistant to bactrim, but sensitive to Cipro.  My first choice would be cipro but could also consider keflex or nitrofurantoin as alternatives. Stopped letrozole due to this and other side effects at 5 years. Using estradiol cream, 2 g three times a week with some improvement; went from 3 UTIs per year to maybe one.  2. Possible arthritis:  Experiencing low back pain, radiates into hip.  [facility] prescribed PT. An MRI was done recently which per her reported some facet arthropathy but I don't believe I have seen the radiologist's read on that.  Ju had it faxed to me, I will review the paper file when I am back in the clinic this week.  3.  Intermittent diarrhea noted as well, certain foods will do this, and also she has an old hemorrhoid tag in the external anal area.  She mentioned this as it might relate to the bladder infections with E coli.       Review of Systems    ROS: 10 point ROS neg other than the symptoms noted above in the HPI.      Objective           Vitals:  No vitals were obtained today due to virtual visit.    Physical Exam   GENERAL: Healthy, alert and no distress  EYES: Eyes grossly normal to inspection.  No discharge or erythema, or obvious scleral/conjunctival abnormalities.  RESP: No audible wheeze, cough, or visible cyanosis.  No visible retractions or increased work of breathing.    SKIN: Visible skin clear. No significant rash, abnormal pigmentation or lesions.  NEURO: Cranial nerves grossly intact.  Mentation and speech appropriate for age.  PSYCH: Mentation appears normal, affect normal/bright, judgement and insight intact, normal speech and appearance well-groomed.        A/P Namita was seen today for uti  f/u and arthritis.  At first, I was hesitant to prescribe an antibiotic for UTI in the future without being seen, but Ju indicated she would contact the clinic and leave a urine sample for UA/UC prior to starting Cipro.  If she were traveling, this could be accomplished at a local urgent care although this is always less than ideal.  I appreciate her approach in terms of wanting continuity of care.  I mention a virtual visit could be an option, and that our clinic will have Saturday appointments going forward.    Meanwhile, we talked about prevention of UTIs.  It is possible that changing from the estrogen cream to the tablet could give her more consistent dosing, and she feels the cream is rather messy.  Rx sent to pharmacy.    If bladder issues continue, could also consider re-consulting Urology.  It is possible that there could be anatomic reasons for frequent UTIs (pelvic floor dysfunction or urinary retention) or potentially the diagnosis of interstitial cystitis could be entertained.      Atrophic vaginitis  -     estradiol (VAGIFEM) 10 MCG TABS vaginal tablet; Place 1 tablet (10 mcg) vaginally three times a week    Personal history of urinary tract infection  -     ciprofloxacin (CIPRO) 250 MG tablet; Take 1 tablet (250 mg) by mouth 2 times daily for 3 days            Video-Visit Details    Type of service:  Video Visit started 12:42 ended 1:18 duration 36 minutes with another 10 minutes chart review and documentation, same day    Originating Location (pt. Location): Home    Distant Location (provider location):  [facility] INTERNAL MEDICINE [place]     Platform used for Video Visit: XIFIN

## 2021-04-19 NOTE — NURSING NOTE
Chief Complaint   Patient presents with     UTI     pt would like to discuss recurring uti's     Arthritis     pt would like to discuss arthritis       Ofelia Ambriz CMA, EMT at 12:29 PM on 4/19/2021.

## 2021-05-18 DIAGNOSIS — M81.8 OTHER OSTEOPOROSIS, UNSPECIFIED PATHOLOGICAL FRACTURE PRESENCE: ICD-10-CM

## 2021-05-18 RX ORDER — ALENDRONATE SODIUM 70 MG/1
TABLET ORAL
Qty: 12 TABLET | Refills: 0 | Status: SHIPPED | OUTPATIENT
Start: 2021-05-18 | End: 2021-08-09

## 2021-05-18 NOTE — TELEPHONE ENCOUNTER
Last OV 11/12/20, next follow-up 8/16/21    Per last OV notes: 2. 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     - repeat dexa scan in 2021.  I advised she continue fosamax til then.    Last filled: 6/19/20 for #12 tabs and 3 refills by Dr. Alegria    Routed to provider for approval

## 2021-05-28 DIAGNOSIS — G47.09 OTHER INSOMNIA: ICD-10-CM

## 2021-06-01 NOTE — TELEPHONE ENCOUNTER
Patient Requested  zolpidem (AMBIEN) 5 MG tablet   Last Filled  09/02/2020  Last Office Visit  04/19/2021  Next Office Visit     Checked  06/01/2021    DX:     Pharmacy:     CANDY COLEMAN CMA at 8:35 AM on 6/1/2021.

## 2021-06-02 RX ORDER — ZOLPIDEM TARTRATE 5 MG/1
5 TABLET ORAL
Qty: 60 TABLET | Refills: 5 | Status: SHIPPED | OUTPATIENT
Start: 2021-06-02 | End: 2022-02-08

## 2021-07-11 ENCOUNTER — TRANSFERRED RECORDS (OUTPATIENT)
Dept: HEALTH INFORMATION MANAGEMENT | Facility: CLINIC | Age: 66
End: 2021-07-11

## 2021-07-14 ENCOUNTER — TRANSFERRED RECORDS (OUTPATIENT)
Dept: HEALTH INFORMATION MANAGEMENT | Facility: CLINIC | Age: 66
End: 2021-07-14

## 2021-07-15 ENCOUNTER — MYC MEDICAL ADVICE (OUTPATIENT)
Dept: INTERNAL MEDICINE | Facility: CLINIC | Age: 66
End: 2021-07-15

## 2021-07-15 DIAGNOSIS — R35.0 URINARY FREQUENCY: Primary | ICD-10-CM

## 2021-07-22 NOTE — TELEPHONE ENCOUNTER
Please offer in person in pcc, np or resident any provider for follow up   Ceci Richardson EMT at 1:50 PM on 7/22/2021.  [facility]  [facility]  [place]  [phone]

## 2021-07-27 ENCOUNTER — RECORDS - HEALTHEAST (OUTPATIENT)
Dept: ADMINISTRATIVE | Facility: CLINIC | Age: 66
End: 2021-07-27

## 2021-08-06 DIAGNOSIS — M81.8 OTHER OSTEOPOROSIS, UNSPECIFIED PATHOLOGICAL FRACTURE PRESENCE: ICD-10-CM

## 2021-08-09 RX ORDER — ALENDRONATE SODIUM 70 MG/1
TABLET ORAL
Qty: 12 TABLET | Refills: 0 | Status: SHIPPED | OUTPATIENT
Start: 2021-08-09 | End: 2021-10-24

## 2021-08-13 ENCOUNTER — ANCILLARY PROCEDURE (OUTPATIENT)
Dept: MAMMOGRAPHY | Facility: CLINIC | Age: 66
End: 2021-08-13
Attending: INTERNAL MEDICINE
Payer: COMMERCIAL

## 2021-08-13 ENCOUNTER — ANCILLARY PROCEDURE (OUTPATIENT)
Dept: BONE DENSITY | Facility: CLINIC | Age: 66
End: 2021-08-13
Attending: INTERNAL MEDICINE
Payer: COMMERCIAL

## 2021-08-13 DIAGNOSIS — C50.911 MALIGNANT NEOPLASM OF RIGHT BREAST IN FEMALE, ESTROGEN RECEPTOR POSITIVE, UNSPECIFIED SITE OF BREAST (H): ICD-10-CM

## 2021-08-13 DIAGNOSIS — Z17.0 MALIGNANT NEOPLASM OF RIGHT BREAST IN FEMALE, ESTROGEN RECEPTOR POSITIVE, UNSPECIFIED SITE OF BREAST (H): ICD-10-CM

## 2021-08-13 DIAGNOSIS — Z79.811 LONG TERM (CURRENT) USE OF AROMATASE INHIBITORS: ICD-10-CM

## 2021-08-13 DIAGNOSIS — Z12.31 VISIT FOR SCREENING MAMMOGRAM: ICD-10-CM

## 2021-08-13 DIAGNOSIS — M81.8 OTHER OSTEOPOROSIS, UNSPECIFIED PATHOLOGICAL FRACTURE PRESENCE: ICD-10-CM

## 2021-08-13 PROCEDURE — 77067 SCR MAMMO BI INCL CAD: CPT | Mod: GC | Performed by: RADIOLOGY

## 2021-08-13 PROCEDURE — 77063 BREAST TOMOSYNTHESIS BI: CPT | Mod: GC | Performed by: RADIOLOGY

## 2021-08-13 PROCEDURE — 77080 DXA BONE DENSITY AXIAL: CPT

## 2021-08-16 ENCOUNTER — VIRTUAL VISIT (OUTPATIENT)
Dept: ONCOLOGY | Facility: CLINIC | Age: 66
End: 2021-08-16
Attending: INTERNAL MEDICINE
Payer: COMMERCIAL

## 2021-08-16 DIAGNOSIS — M81.8 OTHER OSTEOPOROSIS, UNSPECIFIED PATHOLOGICAL FRACTURE PRESENCE: Primary | ICD-10-CM

## 2021-08-16 DIAGNOSIS — Z12.31 ENCOUNTER FOR SCREENING MAMMOGRAM FOR MALIGNANT NEOPLASM OF BREAST: ICD-10-CM

## 2021-08-16 DIAGNOSIS — Z17.0 MALIGNANT NEOPLASM OF RIGHT BREAST IN FEMALE, ESTROGEN RECEPTOR POSITIVE, UNSPECIFIED SITE OF BREAST (H): ICD-10-CM

## 2021-08-16 DIAGNOSIS — N89.8 VAGINAL DRYNESS: ICD-10-CM

## 2021-08-16 DIAGNOSIS — Z79.811 LONG TERM (CURRENT) USE OF AROMATASE INHIBITORS: ICD-10-CM

## 2021-08-16 DIAGNOSIS — C50.911 MALIGNANT NEOPLASM OF RIGHT BREAST IN FEMALE, ESTROGEN RECEPTOR POSITIVE, UNSPECIFIED SITE OF BREAST (H): ICD-10-CM

## 2021-08-16 PROCEDURE — 99214 OFFICE O/P EST MOD 30 MIN: CPT | Mod: 95 | Performed by: INTERNAL MEDICINE

## 2021-08-16 NOTE — PROGRESS NOTES
What phone number would you like to be contacted at? [phone]  How would you like to obtain your AVS? Jone Hernandez is a 65 year old female who is being evaluated via a billable telephone visit.      Phone call duration: 15 minutes    Delphine Alegria MD    ONCOLOGY FOLLOW UP VISIT  August 16, 2021     Namita returns today for followup of a stage I, T1b N0, ER/HI-positive and HER2-negative breast cancer now s/p curative intent therapy and no longer on adjuvant endocrine therapy.      HISTORY OF PRESENT ILLNESS:   Namita is a 66-year-old woman who comes in today for followup for her breast cancer. Briefly, Namita underwent a mammogram and ultrasound. Mammogram on 04/06/2015 suggested a 1.5 cm abnormal distortion confirmed by ultrasound.  By contrast mammography, this revealed an 18 mm area of abnormality at the 2 o'clock position involving her right breast.  Biopsy was recommended and confirmed an invasive ductal carcinoma, grade 1, ER/HI-positive and HER2-negative.  She underwent a lumpectomy and sentinel lymph node biopsy by Dr. Yoshi Oconnor.  Her final staging was a 6 mm tumor, T1b N0, ER/HI-positive and HER2-negative.  All margins were negative. She completed her RTx (52.4 Gy in 20 sessions 6/31-7/27). She started Tamoxifen in 09/2015. She switched to letrozole July 2018.     She is also on fosamax for bone health.    She completed 5 years in August 2020 and stopped letrozole at that time.      INTERVAL HISTORY:   She returns today in follow up and feels quite well.      She has been having a lot of uretheral discomfort.  She has also had recurrent UTIs.  She is working with her PCP on this.  She thinks the vaginal dryness has improved slightly since being off of the aromatase inhibitor    She was very sedentery during the pandemic.  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  She had an injection in her lumbar spine and is now working with PT.  She is increasing her physical activity.    She  is active with exercise (walking, pilates, strength training). She eats healthy diet.        She has not noticed any changes in her breast tissue.  No nodules or masses.    Her 10-point review of systems is otherwise negative.         PHYSICAL EXAMINATION:  Deferred due to the covid pandemic  Speaking clearly, and fluently    Dexa scan - 4% increase in hips since 2015, 7 % increase in lumbar spine.    MRI spine - degenerative changes, no metastatic disease    Mammogram 8/13/21 - Performed on: 8/13/21     Compared to: 08/03/2020, 07/08/2019, and 07/09/2018     Technique:  This study was evaluated with the assistance of Computer-Aided Detection.  Breast Tomosynthesis was used in interpretation.     Findings: The breasts are heterogeneously dense, which may obscure small masses.  There is no radiographic evidence of malignancy.      IMPRESSION: ACR BI-RADS Category 1: Negative     RECOMMENDED FOLLOW-UP: Annual routine screening mammogram     The results and recommendations of this examination will be communicated to the patient.    ASSESSMENT AND PLAN:    This 66-year-old woman with a T1b N0, ER/AR-positive and HER2-negative invasive ductal carcinoma, grade 1, involving the right breast, status post lumpectomy and right breast RTx. She is now on Tamoxifen for adjuvant endocrine therapy.     1.  Breast cancer.  [id number], stage IA, start tamoxifen Sept 2015, switched to letrozole July 2018 with plan to complete at least 5 years of endocrine therapy. She is tolerating the AI without any issues.   -Annual mammogram done, which was normal as above.   -She has completed five years of letrozole.    -We discussed having her remain off of letrozole now that she has completed five years.  -Annual mammogram; we reviewed follow up in our breast program or with her PCP; she is going to talk to Dr Cespedes about this.      2. 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     - repeat dexa scan in 2021.  This demonstrates improvement in her bone density.  - Continue Ca++/vit D, strength exercises, as doing.     3. Arthritis - she has had spine injections and this has improved her pain.  Reviewed mri spine.  She is working with PT and trying to increase her physical exercise.    4. Diet and exercise. Reviewed goal for 150 minutes moderate intensity activity weekly. She is trying to increase her activity since the pandemic.      5. Vaginal dryness - this continues to be a problem intermittently.  She has tried estradiol 3x per week with minimal improvement.  She tried osphena and did not think this improved either.  Overall this is improving with being off of the AI and with her estrogen cream.  We discussed the possibility of using a vaginal estrogen ring if her symptoms persist.      Delphine Alegria MD

## 2021-08-16 NOTE — LETTER
8/16/2021         RE: Namita Hernandez  [address] [place] [address]        Dear Colleague,    Thank you for referring your patient, Namita Hernandez, to the [facility]. Please see a copy of my visit note below.        ONCOLOGY FOLLOW UP VISIT  August 16, 2021     Namita returns today for followup of a stage I, T1b N0, ER/AZ-positive and HER2-negative breast cancer now s/p curative intent therapy and no longer on adjuvant endocrine therapy.      HISTORY OF PRESENT ILLNESS:   Namita is a 66-year-old woman who comes in today for followup for her breast cancer. Briefly, Namita underwent a mammogram and ultrasound. Mammogram on 04/06/2015 suggested a 1.5 cm abnormal distortion confirmed by ultrasound.  By contrast mammography, this revealed an 18 mm area of abnormality at the 2 o'clock position involving her right breast.  Biopsy was recommended and confirmed an invasive ductal carcinoma, grade 1, ER/AZ-positive and HER2-negative.  She underwent a lumpectomy and sentinel lymph node biopsy by Dr. Yoshi Oconnor.  Her final staging was a 6 mm tumor, T1b N0, ER/AZ-positive and HER2-negative.  All margins were negative. She completed her RTx (52.4 Gy in 20 sessions 6/31-7/27). She started Tamoxifen in 09/2015. She switched to letrozole July 2018.     She is also on fosamax for bone health.    She completed 5 years in August 2020 and stopped letrozole at that time.      INTERVAL HISTORY:   She returns today in follow up and feels quite well.      She has been having a lot of uretheral discomfort.  She has also had recurrent UTIs.  She is working with her PCP on this.  She thinks the vaginal dryness has improved slightly since being off of the aromatase inhibitor    She was very sedentery during the pandemic.  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  She had an injection in her lumbar spine and is now working with PT.  She is increasing her physical activity.    She is active with  exercise (walking, pilates, strength training). She eats healthy diet.        She has not noticed any changes in her breast tissue.  No nodules or masses.    Her 10-point review of systems is otherwise negative.         PHYSICAL EXAMINATION:  Deferred due to the covid pandemic  Speaking clearly, and fluently    Dexa scan - 4% increase in hips since 2015, 7 % increase in lumbar spine.    MRI spine - degenerative changes, no metastatic disease    Mammogram 8/13/21 - Performed on: 8/13/21     Compared to: 08/03/2020, 07/08/2019, and 07/09/2018     Technique:  This study was evaluated with the assistance of Computer-Aided Detection.  Breast Tomosynthesis was used in interpretation.     Findings: The breasts are heterogeneously dense, which may obscure small masses.  There is no radiographic evidence of malignancy.      IMPRESSION: ACR BI-RADS Category 1: Negative     RECOMMENDED FOLLOW-UP: Annual routine screening mammogram     The results and recommendations of this examination will be communicated to the patient.    ASSESSMENT AND PLAN:    This 66-year-old woman with a T1b N0, ER/NE-positive and HER2-negative invasive ductal carcinoma, grade 1, involving the right breast, status post lumpectomy and right breast RTx. She is now on Tamoxifen for adjuvant endocrine therapy.     1.  Breast cancer.  [id number], stage IA, start tamoxifen Sept 2015, switched to letrozole July 2018 with plan to complete at least 5 years of endocrine therapy. She is tolerating the AI without any issues.   -Annual mammogram done, which was normal as above.   -She has completed five years of letrozole.    -We discussed having her remain off of letrozole now that she has completed five years.  -Annual mammogram; we reviewed follow up in our breast program or with her PCP; she is going to talk to Dr Cespedes about this.      2. 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     - repeat dexa scan in 2021. This demonstrates  improvement in her bone density.  - Continue Ca++/vit D, strength exercises, as doing.     3. Arthritis - she has had spine injections and this has improved her pain.  Reviewed mri spine.  She is working with PT and trying to increase her physical exercise.    4. Diet and exercise. Reviewed goal for 150 minutes moderate intensity activity weekly. She is trying to increase her activity since the pandemic.      5. Vaginal dryness - this continues to be a problem intermittently.  She has tried estradiol 3x per week with minimal improvement.  She tried osphena and did not think this improved either.  Overall this is improving with being off of the AI and with her estrogen cream.  We discussed the possibility of using a vaginal estrogen ring if her symptoms persist.      Delphine Alegria MD

## 2021-08-17 ENCOUNTER — MYC MEDICAL ADVICE (OUTPATIENT)
Dept: INTERNAL MEDICINE | Facility: CLINIC | Age: 66
End: 2021-08-17

## 2021-08-23 ENCOUNTER — TELEPHONE (OUTPATIENT)
Dept: INTERNAL MEDICINE | Facility: CLINIC | Age: 66
End: 2021-08-23

## 2021-08-23 ENCOUNTER — LAB (OUTPATIENT)
Dept: LAB | Facility: CLINIC | Age: 66
End: 2021-08-23
Payer: COMMERCIAL

## 2021-08-23 DIAGNOSIS — R35.0 URINARY FREQUENCY: ICD-10-CM

## 2021-08-23 DIAGNOSIS — R35.0 URINARY FREQUENCY: Primary | ICD-10-CM

## 2021-08-23 LAB
ALBUMIN UR-MCNC: NEGATIVE MG/DL
APPEARANCE UR: CLEAR
BILIRUB UR QL STRIP: NEGATIVE
COLOR UR AUTO: NORMAL
GLUCOSE UR STRIP-MCNC: NEGATIVE MG/DL
HGB UR QL STRIP: NEGATIVE
KETONES UR STRIP-MCNC: NEGATIVE MG/DL
LEUKOCYTE ESTERASE UR QL STRIP: NEGATIVE
NITRATE UR QL: NEGATIVE
PH UR STRIP: 7 [PH] (ref 5–7)
RBC URINE: <1 /HPF
SP GR UR STRIP: 1.01 (ref 1–1.03)
SQUAMOUS EPITHELIAL: <1 /HPF
UROBILINOGEN UR STRIP-MCNC: NORMAL MG/DL
WBC URINE: <1 /HPF

## 2021-08-23 PROCEDURE — 81001 URINALYSIS AUTO W/SCOPE: CPT | Performed by: PATHOLOGY

## 2021-08-23 NOTE — TELEPHONE ENCOUNTER
[facility] Call Center    Phone Message    May a detailed message be left on voicemail: yes     Reason for Call: Symptoms or Concerns     If patient has red-flag symptoms, warm transfer to triage line    Current symptom or concern: possible UTI    Symptoms have been present for:  2-3 day(s)    Has patient previously been seen for this? No      Are there any new or worsening symptoms? Yes: Patient calling stating she may have an UTI and is requesting a return call to discuss thank you. Writer offered same day virtual visit, patient declined.       Action Taken: Message routed to:  [facility] ([facility]    Travel Screening: Not Applicable

## 2021-08-23 NOTE — CONFIDENTIAL NOTE
Spoke to Namita via telephone, she stated that she thinks she is beginning to have some symptoms of a UTI. She said that her vaginal area is itching, but she also has vaginal atrophy so it can be difficult to tell what is going on. She said that she has frequent UTIs due to the atrophy and would like to stay on top of treating them. She said she does not feel this is urgent, but requested a urinalysis that she will complete today. Has taken Keflex in the past for this. I asked that she reach out once her results come in - as sometimes it can take longer for us to receive the results and make recommendations. She agreed with the plan. She said that she does have an appointment with Dr. Martinez next week so she can discuss this further.  Will be looking out for her [employer] message after she gives the sample today.  Thais Moreira RN

## 2021-09-01 ENCOUNTER — OFFICE VISIT (OUTPATIENT)
Dept: INTERNAL MEDICINE | Facility: CLINIC | Age: 66
End: 2021-09-01
Payer: COMMERCIAL

## 2021-09-01 VITALS
SYSTOLIC BLOOD PRESSURE: 130 MMHG | WEIGHT: 161 LBS | HEIGHT: 66 IN | BODY MASS INDEX: 25.88 KG/M2 | OXYGEN SATURATION: 97 % | HEART RATE: 91 BPM | DIASTOLIC BLOOD PRESSURE: 88 MMHG

## 2021-09-01 DIAGNOSIS — N95.2 ATROPHIC VAGINITIS: Primary | ICD-10-CM

## 2021-09-01 PROCEDURE — 99213 OFFICE O/P EST LOW 20 MIN: CPT | Performed by: INTERNAL MEDICINE

## 2021-09-01 ASSESSMENT — PAIN SCALES - GENERAL: PAINLEVEL: NO PAIN (0)

## 2021-09-01 ASSESSMENT — MIFFLIN-ST. JEOR: SCORE: 1292.04

## 2021-09-01 NOTE — NURSING NOTE
Chief Complaint   Patient presents with     Recheck Medication     reoccuring utis for quite a long time, has already had a couple this year       Akua Guthrie, EMT at 2:14 PM on 9/1/2021

## 2021-09-11 ENCOUNTER — HEALTH MAINTENANCE LETTER (OUTPATIENT)
Age: 66
End: 2021-09-11

## 2021-10-20 NOTE — TELEPHONE ENCOUNTER
Looks like she indeed has a UTI with positive nitrites.  Per her previous urine cultures and sensitivities, I would recommend starting keflex 500mg BID x 7d.  Will still await urine cultures to make certain she is on the correct antibiotics.  Can follow up with Dr. Call and I would expect Dr. Call to followup/adjust abx if needed   no chest pain and no edema.

## 2021-10-24 DIAGNOSIS — M81.8 OTHER OSTEOPOROSIS, UNSPECIFIED PATHOLOGICAL FRACTURE PRESENCE: ICD-10-CM

## 2021-10-24 RX ORDER — ALENDRONATE SODIUM 70 MG/1
TABLET ORAL
Qty: 12 TABLET | Refills: 0 | Status: SHIPPED | OUTPATIENT
Start: 2021-10-24 | End: 2022-01-14

## 2021-11-06 ENCOUNTER — HEALTH MAINTENANCE LETTER (OUTPATIENT)
Age: 66
End: 2021-11-06

## 2022-01-13 DIAGNOSIS — M81.8 OTHER OSTEOPOROSIS, UNSPECIFIED PATHOLOGICAL FRACTURE PRESENCE: ICD-10-CM

## 2022-01-13 NOTE — TELEPHONE ENCOUNTER
Medication: Fosamax  Last prescribing provider: Dr. Alegria    Last clinic visit date: 8/16/21    Any missed appointments or no-shows since last clinic visit?: No    Recommendations for requested medication (if none, N/A): NA    Next clinic visit date: 8/15/22    Any other pertinent information (if none, N/A): NA    Pended and Routed to Provider.

## 2022-01-14 RX ORDER — ALENDRONATE SODIUM 70 MG/1
TABLET ORAL
Qty: 12 TABLET | Refills: 0 | Status: SHIPPED | OUTPATIENT
Start: 2022-01-14 | End: 2022-04-08

## 2022-01-27 DIAGNOSIS — N95.2 ATROPHIC VAGINITIS: ICD-10-CM

## 2022-01-27 RX ORDER — ESTRADIOL 0.1 MG/G
2 CREAM VAGINAL
Qty: 32 G | Refills: 3 | OUTPATIENT
Start: 2022-01-27

## 2022-01-27 NOTE — TELEPHONE ENCOUNTER
Requested Prescriptions   Pending Prescriptions Disp Refills     estradiol (ESTRACE) 0.1 MG/GM vaginal cream 32 g 3     Sig: Place 2 g vaginally four times a week       There is no refill protocol information for this order        Last Written Prescription Date:  2/9/21  Last Fill Quantity: 32 g,  # refills: 3   Last office visit: 3/3/2021 with prescribing provider:  Dr Call   Future Office Visit:  None    Refill denied pt should contact provider  Appears to be treated by IM provider  Mey Reyes RN on 1/27/2022 at 11:20 AM

## 2022-02-08 ENCOUNTER — OFFICE VISIT (OUTPATIENT)
Dept: OBGYN | Facility: CLINIC | Age: 67
End: 2022-02-08
Payer: COMMERCIAL

## 2022-02-08 VITALS
HEART RATE: 74 BPM | HEIGHT: 66 IN | DIASTOLIC BLOOD PRESSURE: 78 MMHG | BODY MASS INDEX: 26.52 KG/M2 | SYSTOLIC BLOOD PRESSURE: 118 MMHG | WEIGHT: 165 LBS

## 2022-02-08 DIAGNOSIS — N94.89 VAGINAL BURNING: Primary | ICD-10-CM

## 2022-02-08 LAB
BACTERIAL VAGINOSIS SMEAR: ABNORMAL
CLUE CELLS: PRESENT
NUGENT SCORE: 1
TRICHOMONAS, WET PREP: ABNORMAL
WBC'S/HIGH POWER FIELD, WET PREP: ABNORMAL
WHITE BLOOD CELLS: NORMAL
YEAST, WET PREP: ABNORMAL

## 2022-02-08 PROCEDURE — 87102 FUNGUS ISOLATION CULTURE: CPT | Performed by: NURSE PRACTITIONER

## 2022-02-08 PROCEDURE — 87205 SMEAR GRAM STAIN: CPT | Performed by: NURSE PRACTITIONER

## 2022-02-08 PROCEDURE — 87210 SMEAR WET MOUNT SALINE/INK: CPT | Performed by: NURSE PRACTITIONER

## 2022-02-08 PROCEDURE — 87106 FUNGI IDENTIFICATION YEAST: CPT | Performed by: NURSE PRACTITIONER

## 2022-02-08 PROCEDURE — 99213 OFFICE O/P EST LOW 20 MIN: CPT | Performed by: NURSE PRACTITIONER

## 2022-02-08 RX ORDER — METRONIDAZOLE 500 MG/1
500 TABLET ORAL 2 TIMES DAILY
Qty: 14 TABLET | Refills: 0 | Status: SHIPPED | OUTPATIENT
Start: 2022-02-08 | End: 2022-02-15

## 2022-02-08 ASSESSMENT — MIFFLIN-ST. JEOR: SCORE: 1310.19

## 2022-02-08 NOTE — PROGRESS NOTES
SUBJECTIVE:                                                   Namita Hernandez is a 66 year old female who presents to clinic today for the following health issue(s):  Patient presents with:  Vaginal Bleeding: vaginal discomfort       HPI:  Patient here today with concerns of vaginal burning for over 2 weeks. She is postmenopausal and on vaginal estrogen replacement. She has tried Vagifem in the past. She is currently using Estrace vaginal cream. She has reoccurring UTIs and yeast infections as well. She was recently treated for a UTI at the beginning of January.    She is not currently sexually active.    No LMP recorded. Patient is postmenopausal..     Patient is not sexually active, .  Using menopause for contraception.    reports that she quit smoking about 36 years ago. She smoked 0.00 packs per day. She has never used smokeless tobacco.    STD testing offered?  Declined    Health maintenance updated:  yes    Today's PHQ-2 Score:   PHQ-2 (  [employer]) 3/17/2021   Q1: Little interest or pleasure in doing things 0   Q2: Feeling down, depressed or hopeless 0   PHQ-2 Score 0   PHQ-2 Total Score (12-17 Years)- Positive if 3 or more points; Administer PHQ-A if positive 0     Today's PHQ-9 Score:   PHQ-9 SCORE 2018   PHQ-9 Total Score 2     Today's JL-7 Score:   JL-7 SCORE 2018   Total Score 0       Problem list and histories reviewed & adjusted, as indicated.  Additional history: as documented.    Patient Active Problem List   Diagnosis     Breast cancer, right breast (H)     Multiple thyroid nodules     Cervical adenopathy     Elbow pain, left     Right shoulder pain     Hypovitaminosis D     Senile osteoporosis     Parathyroid abnormality (H)     History of colonic polyps     Endometrial polyp     Past Surgical History:   Procedure Laterality Date     DILATION AND CURETTAGE, OPERATIVE HYSTEROSCOPY WITH MORCELLATOR, COMBINED N/A 2019    Procedure: HYSTEROSCOPY DILATION AND CURRETAGE WITH  "MYOSURE;  Surgeon: Sushila Melendez MD;  Location: [facility] OR     EXCISION BENIGN LESION COMPL Right     Abdominal lipoma removed      EYE SURGERY  1960    strabismus     LUMPECTOMY BREAST WITH SENTINEL NODE, COMBINED Right 2015    Procedure: COMBINED LUMPECTOMY BREAST WITH SENTINEL NODE;  Surgeon: Yoshi Oconnor MD;  Location: [facility] OR      Social History     Tobacco Use     Smoking status: Former Smoker     Packs/day: 0.00     Quit date: 1986     Years since quittin.1     Smokeless tobacco: Never Used   Substance Use Topics     Alcohol use: Not Currently      Problem (# of Occurrences) Relation (Name,Age of Onset)    Breast Cancer (2) Maternal Grandmother: late 50s, Mother: late 60s    Diabetes (1) Maternal Grandfather    Meniere's disease (1) Father    Thyroid Disease (1) Mother: nodlue       Negative family history of: Thyroid Cancer, Glaucoma, Macular Degeneration, Retinal detachment, Melanoma, Skin Cancer            Current Outpatient Medications   Medication Sig     cholecalciferol (VITAMIN D) 1000 UNIT tablet Take by mouth daily     estradiol (ESTRACE) 0.1 MG/GM vaginal cream Place 2 g vaginally four times a week     metroNIDAZOLE (FLAGYL) 500 MG tablet Take 1 tablet (500 mg) by mouth 2 times daily for 7 days     vitamin  B complex with vitamin C (VITAMIN  B COMPLEX) TABS Take 1 tablet by mouth daily      alendronate (FOSAMAX) 70 MG tablet TAKE 1 TABLET BY MOUTH ONE TIME PER WEEK     CALCIUM PO Take 2-4 tablets by mouth daily      No current facility-administered medications for this visit.     Allergies   Allergen Reactions     Sulfa Drugs Hives       ROS:  12 point review of systems negative other than symptoms noted below or in the HPI.  Genitourinary: burrning   No urinary frequency or dysuria, bladder or kidney problems      OBJECTIVE:     /78   Pulse 74   Ht 1.676 m (5' 6\")   Wt 74.8 kg (165 lb)   BMI 26.63 kg/m    Body mass index is 26.63 " kg/m .    Exam:  Constitutional:  Appearance: Well nourished, well developed alert, in no acute distress  Psychiatric:  Mentation appears normal and affect normal/bright.  Pelvic Exam:  External Genitalia:     Normal appearance for age, no discharge present, no tenderness present, no inflammatory lesions present, color normal  Vagina:     Normal vaginal vault without central or paravaginal defects, ATROPHIC good estrogen effect.  Bladder:     Nontender to palpation  Urethra:   Urethral Body:  Urethra palpation normal, urethra structural support normal   Urethral Meatus:  No erythema or lesions present  Cervix:     Appearance healthy, no lesions present, nontender to palpation, no bleeding present  Uterus:     Nontender to palpation, no masses present, position anteflexed, mobility: normal  Adnexa:     No adnexal tenderness present, no adnexal masses present  Perineum:     Perineum within normal limits, no evidence of trauma, no rashes or skin lesions present  Inguinal Lymph Nodes:     No lymphadenopathy present       In-Clinic Test Results:  Results for orders placed or performed in visit on 02/08/22 (from the past 24 hour(s))   Bacterial Vaginosis Smear    Specimen: Vagina; Swab    Narrative    The following orders were created for panel order Bacterial Vaginosis Smear.  Procedure                               Abnormality         Status                     ---------                               -----------         ------                     Bacterial Vaginosis Stain[[id number]]                        In process                   Please view results for these tests on the individual orders.   Wet  Prep    Specimen: Vagina; Swab   Result Value Ref Range    Trichomonas Absent Absent    Yeast Absent Absent    Clue Cells Present (A) Absent    WBCs/high power field 1+ (A) None       ASSESSMENT/PLAN:                                                        ICD-10-CM    1. Vaginal burning  N94.9 Bacterial Vaginosis Smear      Wet  Prep     Yeast culture     metroNIDAZOLE (FLAGYL) 500 MG tablet       There are no Patient Instructions on file for this visit.    66-year-old female with recurrent UTIs and presumed yeast infections. We have counseled her on the use of vaginal estrogen cream and reiterated proper use and diligence with the therapy. We have also encouraged her to start a women's health probiotic. Wet prep: + clue cells. Will treat with flagyl. Warm bath soaks recommended.   Vaginal cultures will be sent.    JEY Wilson CNP  [facility] [place]

## 2022-02-10 LAB — BACTERIA SPEC CULT: ABNORMAL

## 2022-02-10 RX ORDER — FLUCONAZOLE 150 MG/1
150 TABLET ORAL
Qty: 4 TABLET | Refills: 0 | Status: SHIPPED | OUTPATIENT
Start: 2022-02-10 | End: 2022-04-05

## 2022-04-01 NOTE — PROGRESS NOTES
SUBJECTIVE:                                                   Namita Hernandez is a 67 year old female who presents to clinic today for the following health issue(s):  Patient presents with:  Vaginal Problem: F/u to yeast and BV. Took metrol gel and finished approximately in February. Still having some vaginal burning, no discharge.      HPI:  Patient here today for follow-up of bacteria and yeast infection that was treated in February.  She feels a significant improvement in her symptoms but on occasion will still have a twinge of itching.  She continues to take vitamin C twice daily as well as her vaginal estrogen.  She denies any vaginal bleeding.  She did have 1 day of slight burning on urination and she took 1 tablet of Keflex that she had in her home.  She had no symptoms after.  Urinalysis is pending at this time.    No LMP recorded. Patient is postmenopausal..     Patient is sexually active, .  Using menopause for contraception.    reports that she quit smoking about 36 years ago. She smoked 0.00 packs per day. She has never used smokeless tobacco.    STD testing offered?  Declined    Health maintenance updated:  yes    Today's PHQ-2 Score:   PHQ-2 (  [employer]) 3/17/2021   Q1: Little interest or pleasure in doing things 0   Q2: Feeling down, depressed or hopeless 0   PHQ-2 Score 0   PHQ-2 Total Score (12-17 Years)- Positive if 3 or more points; Administer PHQ-A if positive 0     Today's PHQ-9 Score:   PHQ-9 SCORE 2018   PHQ-9 Total Score 2     Today's JL-7 Score:   JL-7 SCORE 2018   Total Score 0       Problem list and histories reviewed & adjusted, as indicated.  Additional history: as documented.    Patient Active Problem List   Diagnosis     Breast cancer, right breast (H)     Multiple thyroid nodules     Cervical adenopathy     Elbow pain, left     Right shoulder pain     Hypovitaminosis D     Senile osteoporosis     Parathyroid abnormality (H)     History of colonic polyps      Endometrial polyp     Past Surgical History:   Procedure Laterality Date     DILATION AND CURETTAGE, OPERATIVE HYSTEROSCOPY WITH MORCELLATOR, COMBINED N/A 2019    Procedure: HYSTEROSCOPY DILATION AND CURRETAGE WITH MYOSURE;  Surgeon: Sushila Melendez MD;  Location: [facility] OR     EXCISION BENIGN LESION COMPL Right     Abdominal lipoma removed      EYE SURGERY  1960    strabismus     LUMPECTOMY BREAST WITH SENTINEL NODE, COMBINED Right 2015    Procedure: COMBINED LUMPECTOMY BREAST WITH SENTINEL NODE;  Surgeon: Yoshi Oconnor MD;  Location: [facility] OR      Social History     Tobacco Use     Smoking status: Former Smoker     Packs/day: 0.00     Quit date: 1986     Years since quittin.2     Smokeless tobacco: Never Used   Substance Use Topics     Alcohol use: Yes     Comment: Rare- glass of wine      Problem (# of Occurrences) Relation (Name,Age of Onset)    Breast Cancer (2) Maternal Grandmother: late 50s, Mother: late 60s    Diabetes (1) Maternal Grandfather    Meniere's disease (1) Father    Thyroid Disease (1) Mother: nodlue       Negative family history of: Thyroid Cancer, Glaucoma, Macular Degeneration, Retinal detachment, Melanoma, Skin Cancer            Current Outpatient Medications   Medication Sig     alendronate (FOSAMAX) 70 MG tablet TAKE 1 TABLET BY MOUTH ONE TIME PER WEEK     CALCIUM PO Take 2-4 tablets by mouth daily      cholecalciferol (VITAMIN D) 1000 UNIT tablet Take by mouth daily     estradiol (ESTRACE) 0.1 MG/GM vaginal cream Place 2 g vaginally four times a week     ketoconazole (NIZORAL) 200 MG tablet Take 1 tablet (200 mg) by mouth daily     No current facility-administered medications for this visit.     Allergies   Allergen Reactions     Sulfa Drugs Hives       ROS:  12 point review of systems negative other than symptoms noted below or in the HPI.  Genitourinary: Vaginal Discharge  No urinary frequency or dysuria, bladder or kidney problems, POSITIVE for:,  "vaginal itching or burning      OBJECTIVE:     /70   Ht 1.676 m (5' 6\")   Wt 76.2 kg (168 lb)   Breastfeeding No   BMI 27.12 kg/m    Body mass index is 27.12 kg/m .    Exam:  Constitutional:  Appearance: Well nourished, well developed alert, in no acute distress  Psychiatric:  Mentation appears normal and affect normal/bright.  Pelvic Exam:  External Genitalia:     Normal appearance for age, no discharge present, no tenderness present, no inflammatory lesions present, color normal  Vagina:     Normal vaginal vault without central or paravaginal defects, ATROPHIC good estrogen effect  Bladder:     Nontender to palpation  Urethra:   Urethral Body:  Urethra palpation normal, urethra structural support normal   Urethral Meatus:  No erythema or lesions present  Cervix:     Appearance healthy, no lesions present, nontender to palpation, no bleeding present  Uterus:     Nontender to palpation, no masses present, position anteflexed, mobility: normal  Adnexa:     No adnexal tenderness present, no adnexal masses present  Perineum:     Perineum within normal limits, no evidence of trauma, no rashes or skin lesions present  Inguinal Lymph Nodes:     No lymphadenopathy present       In-Clinic Test Results:  Results for orders placed or performed in visit on 04/05/22 (from the past 24 hour(s))   UA without Microscopic - lab collect   Result Value Ref Range    Color Urine Yellow Colorless, Straw, Light Yellow, Yellow    Appearance Urine Clear Clear    Glucose Urine Negative Negative mg/dL    Bilirubin Urine Negative Negative    Ketones Urine Negative Negative mg/dL    Specific Gravity Urine 1.020 1.003 - 1.035    Blood Urine Negative Negative    pH Urine 8.0 (H) 5.0 - 7.0    Protein Albumin Urine Negative Negative mg/dL    Urobilinogen Urine 0.2 0.2, 1.0 E.U./dL    Nitrite Urine Negative Negative    Leukocyte Esterase Urine Negative Negative   Bacterial Vaginosis Smear    Specimen: Vagina; Swab    Narrative    The " following orders were created for panel order Bacterial Vaginosis Smear.  Procedure                               Abnormality         Status                     ---------                               -----------         ------                     Bacterial Vaginosis Stain[[id number]]                                                     Please view results for these tests on the individual orders.   Wet preparation    Specimen: Vagina; Swab   Result Value Ref Range    Trichomonas Absent Absent    Yeast Present (A) Absent    Clue Cells Absent Absent    WBCs/high power field 1+ (A) None       ASSESSMENT/PLAN:                                                        ICD-10-CM    1. Dysuria  R30.0 UA without Microscopic - lab collect     UA without Microscopic - lab collect   2. Vaginal irritation  N89.8 Bacterial Vaginosis Smear     Fungal or Yeast Culture Routine     Wet preparation     ketoconazole (NIZORAL) 200 MG tablet       There are no Patient Instructions on file for this visit.    67-year-old postmenopausal female with past history of yeast and bacterial vaginosis.  She is doing well from a urinary standpoint and has a negative urinalysis at this time.  Wet prep: + yeast  Vaginal cultures will be sent and we will treat her appropriately.  If her yeast culture comes back positive we discussed trying ketoconazole versus Diflucan.    JEY Wilson CNP  [facility] [place]

## 2022-04-05 ENCOUNTER — OFFICE VISIT (OUTPATIENT)
Dept: OBGYN | Facility: CLINIC | Age: 67
End: 2022-04-05
Payer: COMMERCIAL

## 2022-04-05 VITALS
BODY MASS INDEX: 27 KG/M2 | DIASTOLIC BLOOD PRESSURE: 70 MMHG | HEIGHT: 66 IN | SYSTOLIC BLOOD PRESSURE: 134 MMHG | WEIGHT: 168 LBS

## 2022-04-05 DIAGNOSIS — N89.8 VAGINAL IRRITATION: ICD-10-CM

## 2022-04-05 DIAGNOSIS — R30.0 DYSURIA: Primary | ICD-10-CM

## 2022-04-05 LAB
ALBUMIN UR-MCNC: NEGATIVE MG/DL
APPEARANCE UR: CLEAR
BACTERIAL VAGINOSIS SMEAR: ABNORMAL
BILIRUB UR QL STRIP: NEGATIVE
CLUE CELLS: ABNORMAL
COLOR UR AUTO: YELLOW
GLUCOSE UR STRIP-MCNC: NEGATIVE MG/DL
HGB UR QL STRIP: NEGATIVE
KETONES UR STRIP-MCNC: NEGATIVE MG/DL
LEUKOCYTE ESTERASE UR QL STRIP: NEGATIVE
NITRATE UR QL: NEGATIVE
NUGENT SCORE: 1
PH UR STRIP: 8 [PH] (ref 5–7)
SP GR UR STRIP: 1.02 (ref 1–1.03)
TRICHOMONAS, WET PREP: ABNORMAL
UROBILINOGEN UR STRIP-ACNC: 0.2 E.U./DL
WBC'S/HIGH POWER FIELD, WET PREP: ABNORMAL
WHITE BLOOD CELLS: NORMAL
YEAST, WET PREP: PRESENT

## 2022-04-05 PROCEDURE — 99213 OFFICE O/P EST LOW 20 MIN: CPT | Performed by: NURSE PRACTITIONER

## 2022-04-05 PROCEDURE — 81003 URINALYSIS AUTO W/O SCOPE: CPT | Performed by: NURSE PRACTITIONER

## 2022-04-05 PROCEDURE — 87205 SMEAR GRAM STAIN: CPT | Performed by: NURSE PRACTITIONER

## 2022-04-05 PROCEDURE — 87210 SMEAR WET MOUNT SALINE/INK: CPT | Performed by: NURSE PRACTITIONER

## 2022-04-05 PROCEDURE — 87102 FUNGUS ISOLATION CULTURE: CPT | Performed by: NURSE PRACTITIONER

## 2022-04-05 PROCEDURE — 87106 FUNGI IDENTIFICATION YEAST: CPT | Performed by: NURSE PRACTITIONER

## 2022-04-05 RX ORDER — KETOCONAZOLE 200 MG/1
200 TABLET ORAL DAILY
Qty: 7 TABLET | Refills: 0 | Status: SHIPPED | OUTPATIENT
Start: 2022-04-05 | End: 2022-04-15

## 2022-04-07 LAB — BACTERIA SPEC CULT: ABNORMAL

## 2022-04-08 DIAGNOSIS — M81.8 OTHER OSTEOPOROSIS, UNSPECIFIED PATHOLOGICAL FRACTURE PRESENCE: ICD-10-CM

## 2022-04-08 RX ORDER — ALENDRONATE SODIUM 70 MG/1
TABLET ORAL
Qty: 12 TABLET | Refills: 0 | Status: SHIPPED | OUTPATIENT
Start: 2022-04-08 | End: 2022-06-30

## 2022-04-08 NOTE — TELEPHONE ENCOUNTER
Medication: Fosamax 70 mg tablet  Last prescribing provider: Dr. Alegria    Last clinic visit date: 8/16/21    Any missed appointments or no-shows since last clinic visit?: No    Recommendations for requested medication (if none, N/A): NA    Next clinic visit date: 8/15/22    Any other pertinent information (if none, N/A): NA    Pended and Routed to Provider.

## 2022-04-13 NOTE — PROGRESS NOTES
SUBJECTIVE:                                                   Namita Hernandez is a 67 year old female who presents to clinic today for the following health issue(s):  Patient presents with:  RECHECK: On yeast infection/ Bacterial Vaginosis. Patient feels like she is doing better, sx's have resolved. Declines leaving urine sample today        HPI:  Patient here today for recheck of a vaginal yeast infection.  She was treated on 2 separate occasions and most recently with a 7-day round of ketoconazole.  She states she is doing much better that her symptoms have resolved.    No LMP recorded. Patient is postmenopausal..     Patient is not sexually active, .  Using menopause for contraception.    reports that she quit smoking about 36 years ago. She smoked 0.00 packs per day. She has never used smokeless tobacco.    STD testing offered?  Declined - not sexually active    Health maintenance updated:  yes    Today's PHQ-2 Score:   PHQ-2 (  [employer]) 3/17/2021   Q1: Little interest or pleasure in doing things 0   Q2: Feeling down, depressed or hopeless 0   PHQ-2 Score 0   PHQ-2 Total Score (12-17 Years)- Positive if 3 or more points; Administer PHQ-A if positive 0     Today's PHQ-9 Score:   PHQ-9 SCORE 2018   PHQ-9 Total Score 2     Today's JL-7 Score:   JL-7 SCORE 2018   Total Score 0       Problem list and histories reviewed & adjusted, as indicated.  Additional history: as documented.    Patient Active Problem List   Diagnosis     Breast cancer, right breast (H)     Multiple thyroid nodules     Cervical adenopathy     Elbow pain, left     Right shoulder pain     Hypovitaminosis D     Senile osteoporosis     Parathyroid abnormality (H)     History of colonic polyps     Endometrial polyp     Past Surgical History:   Procedure Laterality Date     DILATION AND CURETTAGE, OPERATIVE HYSTEROSCOPY WITH MORCELLATOR, COMBINED N/A 2019    Procedure: HYSTEROSCOPY DILATION AND CURRETAGE WITH MYOSURE;   Surgeon: Sushila Melendez MD;  Location: [facility] OR     EXCISION BENIGN LESION COMPL Right     Abdominal lipoma removed      EYE SURGERY  1960    strabismus     LUMPECTOMY BREAST WITH SENTINEL NODE, COMBINED Right 2015    Procedure: COMBINED LUMPECTOMY BREAST WITH SENTINEL NODE;  Surgeon: Yoshi Oconnor MD;  Location:  OR      Social History     Tobacco Use     Smoking status: Former Smoker     Packs/day: 0.00     Quit date: 1986     Years since quittin.3     Smokeless tobacco: Never Used   Substance Use Topics     Alcohol use: Yes     Comment: Rare- glass of wine      Problem (# of Occurrences) Relation (Name,Age of Onset)    Breast Cancer (2) Maternal Grandmother: late 50s, Mother: late 60s    Diabetes (1) Maternal Grandfather    Meniere's disease (1) Father    Thyroid Disease (1) Mother: nodlue       Negative family history of: Thyroid Cancer, Glaucoma, Macular Degeneration, Retinal detachment, Melanoma, Skin Cancer            Current Outpatient Medications   Medication Sig     alendronate (FOSAMAX) 70 MG tablet TAKE 1 TABLET BY MOUTH ONE TIME PER WEEK     CALCIUM PO Take 2-4 tablets by mouth daily      cholecalciferol (VITAMIN D) 1000 UNIT tablet Take by mouth daily     estradiol (ESTRACE) 0.1 MG/GM vaginal cream Place 2 g vaginally four times a week     No current facility-administered medications for this visit.     Allergies   Allergen Reactions     Sulfa Drugs Hives       ROS:  12 point review of systems negative other than symptoms noted below or in the HPI.  No urinary frequency or dysuria, bladder or kidney problems      OBJECTIVE:     /70   Wt 75.8 kg (167 lb 3.2 oz)   BMI 26.99 kg/m    Body mass index is 26.99 kg/m .    Exam:  Constitutional:  Appearance: Well nourished, well developed alert, in no acute distress  Psychiatric:  Mentation appears normal and affect normal/bright.  Pelvic Exam:  External Genitalia:     Normal appearance for age, no discharge present,  no tenderness present, no inflammatory lesions present, color normal  Vagina:     Normal vaginal vault without central or paravaginal defects, no discharge present, no inflammatory lesions present, no masses present  Cervix:     Appearance healthy, no lesions present, nontender to palpation, no bleeding present  Perineum:     Perineum within normal limits, no evidence of trauma, no rashes or skin lesions present  Anus:     Anus within normal limits, no hemorrhoids present  Inguinal Lymph Nodes:     No lymphadenopathy present  Pubic Hair:     Normal pubic hair distribution for age  Genitalia and Groin:     No rashes present, no lesions present, no areas of discoloration, no masses present       In-Clinic Test Results:  Results for orders placed or performed in visit on 04/15/22 (from the past 24 hour(s))   Bacterial Vaginosis Smear    Specimen: Vagina; Swab    Narrative    The following orders were created for panel order Bacterial Vaginosis Smear.  Procedure                               Abnormality         Status                     ---------                               -----------         ------                     Bacterial Vaginosis Stain[[id number]]                                                     Please view results for these tests on the individual orders.   Wet prep - Clinic Collect    Specimen: Vagina; Swab   Result Value Ref Range    Trichomonas Absent Absent    Yeast Absent Absent    Clue Cells Present (A) Absent    WBCs/high power field 2+ (A) None       ASSESSMENT/PLAN:                                                        ICD-10-CM    1. Vaginal irritation  N89.8 Bacterial Vaginosis Smear     Yeast culture     Wet prep - Clinic Collect       There are no Patient Instructions on file for this visit.    67-year-old female with a history of vaginitis that was treated on multiple occasions.  Wet prep: + clue cells. Will defer treatment until her cultures are back as her exam is negative. Pt agrees  with plan.   Vaginal cultures will be sent and treated if needed.    JEY Wilson CNP  M [facility] [place]

## 2022-04-15 ENCOUNTER — OFFICE VISIT (OUTPATIENT)
Dept: OBGYN | Facility: CLINIC | Age: 67
End: 2022-04-15
Payer: COMMERCIAL

## 2022-04-15 VITALS — SYSTOLIC BLOOD PRESSURE: 110 MMHG | DIASTOLIC BLOOD PRESSURE: 70 MMHG | WEIGHT: 167.2 LBS | BODY MASS INDEX: 26.99 KG/M2

## 2022-04-15 DIAGNOSIS — N89.8 VAGINAL IRRITATION: Primary | ICD-10-CM

## 2022-04-15 LAB
CLUE CELLS: PRESENT
NUGENT SCORE: 1
TRICHOMONAS, WET PREP: ABNORMAL
WBC'S/HIGH POWER FIELD, WET PREP: ABNORMAL
WHITE BLOOD CELLS: NORMAL
YEAST, WET PREP: ABNORMAL

## 2022-04-15 PROCEDURE — 87102 FUNGUS ISOLATION CULTURE: CPT | Performed by: NURSE PRACTITIONER

## 2022-04-15 PROCEDURE — 87205 SMEAR GRAM STAIN: CPT | Performed by: NURSE PRACTITIONER

## 2022-04-15 PROCEDURE — 87210 SMEAR WET MOUNT SALINE/INK: CPT | Performed by: NURSE PRACTITIONER

## 2022-04-15 PROCEDURE — 99213 OFFICE O/P EST LOW 20 MIN: CPT | Performed by: NURSE PRACTITIONER

## 2022-04-15 NOTE — NURSING NOTE
"Chief Complaint   Patient presents with     RECHECK     On yeast infection/ Bacterial Vaginosis. Patient feels like she is doing better, sx's have resolved. Declines leaving urine sample today       Initial /70   Wt 75.8 kg (167 lb 3.2 oz)   BMI 26.99 kg/m   Estimated body mass index is 26.99 kg/m  as calculated from the following:    Height as of 22: 1.676 m (5' 6\").    Weight as of this encounter: 75.8 kg (167 lb 3.2 oz).  BP completed using cuff size: regular    Questioned patient about current smoking habits.  Pt. quit smoking some time ago.          The following HM Due: NONE      Oleksandr Hart CMA               "

## 2022-04-19 LAB — BACTERIA SPEC CULT: NO GROWTH

## 2022-05-03 ENCOUNTER — OFFICE VISIT (OUTPATIENT)
Dept: OBGYN | Facility: CLINIC | Age: 67
End: 2022-05-03
Payer: COMMERCIAL

## 2022-05-03 VITALS
SYSTOLIC BLOOD PRESSURE: 122 MMHG | BODY MASS INDEX: 27.16 KG/M2 | WEIGHT: 169 LBS | DIASTOLIC BLOOD PRESSURE: 72 MMHG | HEIGHT: 66 IN

## 2022-05-03 DIAGNOSIS — N89.8 VAGINAL IRRITATION: Primary | ICD-10-CM

## 2022-05-03 LAB
CLUE CELLS: ABNORMAL
NUGENT SCORE: 2
TRICHOMONAS, WET PREP: ABNORMAL
WBC'S/HIGH POWER FIELD, WET PREP: ABNORMAL
WHITE BLOOD CELLS: NORMAL
YEAST, WET PREP: ABNORMAL

## 2022-05-03 PROCEDURE — 99213 OFFICE O/P EST LOW 20 MIN: CPT | Performed by: NURSE PRACTITIONER

## 2022-05-03 PROCEDURE — 87102 FUNGUS ISOLATION CULTURE: CPT | Performed by: NURSE PRACTITIONER

## 2022-05-03 PROCEDURE — 87205 SMEAR GRAM STAIN: CPT | Performed by: NURSE PRACTITIONER

## 2022-05-03 PROCEDURE — 87210 SMEAR WET MOUNT SALINE/INK: CPT | Performed by: NURSE PRACTITIONER

## 2022-05-03 NOTE — PROGRESS NOTES
SUBJECTIVE:                                                   Namita Hernandez is a 67 year old female who presents to clinic today for the following health issue(s):  Patient presents with:  Follow Up: previous vaginal symptoms  Today patient is experiencing mild itching and wants to be checked before leaving on a 3 week vacation.      HPI:  Patient here today with concerns of vaginal irritation.  She has no abnormal discharge.  She is using her vaginal estrogen cream 3 times per week.  She is planning to travel to [place] on Thursday for 3 weeks.    No LMP recorded. Patient is postmenopausal..   Patient is not sexually active, .   reports that she quit smoking about 36 years ago. She smoked 0.00 packs per day. She has never used smokeless tobacco.  Health maintenance updated:  Followed by primary care provider      Today's PHQ-2 Score:   PHQ-2 (  [employer]) 3/17/2021   Q1: Little interest or pleasure in doing things 0   Q2: Feeling down, depressed or hopeless 0   PHQ-2 Score 0   PHQ-2 Total Score (12-17 Years)- Positive if 3 or more points; Administer PHQ-A if positive 0     Today's PHQ-9 Score:   PHQ-9 SCORE 2018   PHQ-9 Total Score 2     Today's JL-7 Score:   JL-7 SCORE 2018   Total Score 0       Problem list and histories reviewed & adjusted, as indicated.  Additional history: as documented.    Patient Active Problem List   Diagnosis     Breast cancer, right breast (H)     Multiple thyroid nodules     Cervical adenopathy     Elbow pain, left     Right shoulder pain     Hypovitaminosis D     Senile osteoporosis     Parathyroid abnormality (H)     History of colonic polyps     Endometrial polyp     Past Surgical History:   Procedure Laterality Date     DILATION AND CURETTAGE, OPERATIVE HYSTEROSCOPY WITH MORCELLATOR, COMBINED N/A 2019    Procedure: HYSTEROSCOPY DILATION AND CURRETAGE WITH MYOSURE;  Surgeon: Sushila Melendez MD;  Location: [facility] OR     EXCISION BENIGN LESION COMPL  "Right 1997    Abdominal lipoma removed      EYE SURGERY  1960    strabismus     LUMPECTOMY BREAST WITH SENTINEL NODE, COMBINED Right 2015    Procedure: COMBINED LUMPECTOMY BREAST WITH SENTINEL NODE;  Surgeon: Yoshi Oconnor MD;  Location:  OR      Social History     Tobacco Use     Smoking status: Former Smoker     Packs/day: 0.00     Quit date: 1986     Years since quittin.3     Smokeless tobacco: Never Used   Substance Use Topics     Alcohol use: Yes     Comment: Rare- glass of wine      Problem (# of Occurrences) Relation (Name,Age of Onset)    Breast Cancer (2) Maternal Grandmother: late 50s, Mother: late 60s    Diabetes (1) Maternal Grandfather    Meniere's disease (1) Father    Thyroid Disease (1) Mother: nodlue       Negative family history of: Thyroid Cancer, Glaucoma, Macular Degeneration, Retinal detachment, Melanoma, Skin Cancer            Current Outpatient Medications   Medication Sig     alendronate (FOSAMAX) 70 MG tablet TAKE 1 TABLET BY MOUTH ONE TIME PER WEEK     CALCIUM PO Take 2-4 tablets by mouth daily      cholecalciferol (VITAMIN D) 1000 UNIT tablet Take by mouth daily     estradiol (ESTRACE) 0.1 MG/GM vaginal cream Place 2 g vaginally four times a week (Patient taking differently: Place 2 g vaginally three times a week)     No current facility-administered medications for this visit.     Allergies   Allergen Reactions     Sulfa Drugs Hives       ROS:  12 point review of systems negative other than symptoms noted below or in the HPI.  Genitourinary: Vaginal Itching  No urinary frequency or dysuria, bladder or kidney problems, POSITIVE for:, vaginal itching or burning      OBJECTIVE:     /72   Ht 1.676 m (5' 6\")   Wt 76.7 kg (169 lb)   BMI 27.28 kg/m    Body mass index is 27.28 kg/m .    Exam:  Constitutional:  Appearance: Well nourished, well developed alert, in no acute distress  Psychiatric:  Mentation appears normal and affect normal/bright.  Pelvic Exam:  External " Genitalia:     Normal appearance for age, no discharge present, no tenderness present, no inflammatory lesions present, color normal  Vagina:     Normal vaginal vault without central or paravaginal defects, no discharge present, no inflammatory lesions present, no masses present  Urethra:   Urethral Body:  Urethra palpation normal, urethra structural support normal   Urethral Meatus:  No erythema or lesions present  Cervix:     Appearance healthy, no lesions present, nontender to palpation, no bleeding present  Perineum:     Perineum within normal limits, no evidence of trauma, no rashes or skin lesions present  Anus:     Anus within normal limits, no hemorrhoids present  Inguinal Lymph Nodes:     No lymphadenopathy present  Pubic Hair:     Normal pubic hair distribution for age  Genitalia and Groin:     No rashes present, no lesions present, no areas of discoloration, no masses present       In-Clinic Test Results:  Results for orders placed or performed in visit on 05/03/22 (from the past 24 hour(s))   Wet prep - Clinic Collect    Specimen: Vagina; Swab   Result Value Ref Range    Trichomonas Absent Absent    Yeast Absent Absent    Clue Cells Absent Absent    WBCs/high power field 2+ (A) None   Bacterial Vaginosis Smear    Specimen: Vagina; Swab    Narrative    The following orders were created for panel order Bacterial Vaginosis Smear.  Procedure                               Abnormality         Status                     ---------                               -----------         ------                     Bacterial Vaginosis Stain[[id number]]                                                     Please view results for these tests on the individual orders.       ASSESSMENT/PLAN:                                                        ICD-10-CM    1. Vaginal irritation  N89.8 Wet prep - Clinic Collect     Bacterial Vaginosis Smear     Yeast culture       There are no Patient Instructions on file for this  visit.    67-year-old postmenopausal female with a normal GYN exam.  Wet prep: negative  We will send vaginal culture    JEY Wilson CNP [facility] [place]

## 2022-05-09 LAB — BACTERIA SPEC CULT: NO GROWTH

## 2022-06-30 DIAGNOSIS — M81.8 OTHER OSTEOPOROSIS, UNSPECIFIED PATHOLOGICAL FRACTURE PRESENCE: ICD-10-CM

## 2022-06-30 RX ORDER — ALENDRONATE SODIUM 70 MG/1
TABLET ORAL
Qty: 12 TABLET | Refills: 0 | Status: SHIPPED | OUTPATIENT
Start: 2022-06-30

## 2022-06-30 NOTE — TELEPHONE ENCOUNTER
alendronate (FOSAMAX) Refill   Last prescribing provider: Dr Alegria     Last clinic visit date: 8/16/21 Dr Alegria     Any missed appointments or no-shows since last clinic visit?: no     Recommendations for requested medication (if none, N/A): Copied from chart note 8/16/21 Dr Alegria   2. Osteoporosis in the radius bone, left spine and neck: She is taking fosamax.   - repeat dexa scan in 2021. This demonstrates improvement in her bone density.  - Continue Ca++/vit D, strength exercises, as doing.    She is also on fosamax for bone health.      Next clinic visit date: 8/15/22 Dr Alegria     Any other pertinent information (if none, N/A): N/A

## 2022-08-15 ENCOUNTER — ONCOLOGY VISIT (OUTPATIENT)
Dept: ONCOLOGY | Facility: CLINIC | Age: 67
End: 2022-08-15
Attending: INTERNAL MEDICINE
Payer: COMMERCIAL

## 2022-08-15 ENCOUNTER — ANCILLARY PROCEDURE (OUTPATIENT)
Dept: MAMMOGRAPHY | Facility: CLINIC | Age: 67
End: 2022-08-15
Attending: INTERNAL MEDICINE
Payer: COMMERCIAL

## 2022-08-15 VITALS
OXYGEN SATURATION: 96 % | DIASTOLIC BLOOD PRESSURE: 65 MMHG | BODY MASS INDEX: 28.02 KG/M2 | TEMPERATURE: 98.3 F | HEART RATE: 61 BPM | WEIGHT: 173.6 LBS | SYSTOLIC BLOOD PRESSURE: 132 MMHG | RESPIRATION RATE: 16 BRPM

## 2022-08-15 DIAGNOSIS — Z17.0 MALIGNANT NEOPLASM OF RIGHT BREAST IN FEMALE, ESTROGEN RECEPTOR POSITIVE, UNSPECIFIED SITE OF BREAST (H): ICD-10-CM

## 2022-08-15 DIAGNOSIS — Z12.31 ENCOUNTER FOR SCREENING MAMMOGRAM FOR MALIGNANT NEOPLASM OF BREAST: ICD-10-CM

## 2022-08-15 DIAGNOSIS — M81.8 OTHER OSTEOPOROSIS, UNSPECIFIED PATHOLOGICAL FRACTURE PRESENCE: Primary | ICD-10-CM

## 2022-08-15 DIAGNOSIS — N89.8 VAGINAL DRYNESS: ICD-10-CM

## 2022-08-15 DIAGNOSIS — C50.911 MALIGNANT NEOPLASM OF RIGHT BREAST IN FEMALE, ESTROGEN RECEPTOR POSITIVE, UNSPECIFIED SITE OF BREAST (H): ICD-10-CM

## 2022-08-15 PROCEDURE — G0463 HOSPITAL OUTPT CLINIC VISIT: HCPCS

## 2022-08-15 PROCEDURE — 77063 BREAST TOMOSYNTHESIS BI: CPT | Mod: GC

## 2022-08-15 PROCEDURE — 77067 SCR MAMMO BI INCL CAD: CPT | Mod: GC

## 2022-08-15 PROCEDURE — 99214 OFFICE O/P EST MOD 30 MIN: CPT | Performed by: INTERNAL MEDICINE

## 2022-08-15 ASSESSMENT — PAIN SCALES - GENERAL: PAINLEVEL: NO PAIN (0)

## 2022-08-15 NOTE — NURSING NOTE
"Oncology Rooming Note    August 15, 2022 9:33 AM   Namita Hernandez is a 67 year old female who presents for:    Chief Complaint   Patient presents with     Oncology Clinic Visit     Breast cancer, right breast (H)     Initial Vitals: /65 (BP Location: Left arm, Patient Position: Sitting, Cuff Size: Adult Regular)   Pulse 61   Temp 98.3  F (36.8  C) (Oral)   Resp 16   Wt 78.7 kg (173 lb 9.6 oz)   SpO2 96%   BMI 28.02 kg/m   Estimated body mass index is 28.02 kg/m  as calculated from the following:    Height as of 5/3/22: 1.676 m (5' 6\").    Weight as of this encounter: 78.7 kg (173 lb 9.6 oz). Body surface area is 1.91 meters squared.  No Pain (0) Comment: Data Unavailable   No LMP recorded. Patient is postmenopausal.  Allergies reviewed: Yes  Medications reviewed: Yes    Medications: Medication refills not needed today.  Pharmacy name entered into [email or web address]: [id number] - [place], [place] - [facility] [facility]    Clinical concerns: 1 year follow up        Lakeisha Bueno CMA            "

## 2022-08-15 NOTE — PROGRESS NOTES
ONCOLOGY FOLLOW UP VISIT  August 16, 2022       Namita returns today for followup of a stage I, T1b N0, ER/KS-positive and HER2-negative breast cancer now s/p curative intent therapy and no longer on adjuvant endocrine therapy.      HISTORY OF PRESENT ILLNESS:   Namita is a 66-year-old woman who comes in today for followup for her breast cancer. Briefly, Namita underwent a mammogram and ultrasound. Mammogram on 04/06/2015 suggested a 1.5 cm abnormal distortion confirmed by ultrasound.  By contrast mammography, this revealed an 18 mm area of abnormality at the 2 o'clock position involving her right breast.  Biopsy was recommended and confirmed an invasive ductal carcinoma, grade 1, ER/KS-positive and HER2-negative.  She underwent a lumpectomy and sentinel lymph node biopsy by Dr. Yoshi Oconnor.  Her final staging was a 6 mm tumor, T1b N0, ER/KS-positive and HER2-negative.  All margins were negative. She completed her RTx (52.4 Gy in 20 sessions 6/31-7/27). She started Tamoxifen in 09/2015. She switched to letrozole July 2018.     She is also on fosamax for bone health.    She completed 5 years in August 2020 and stopped letrozole at that time.      INTERVAL HISTORY:   She returns today in follow up and feels quite well.  She has been traveling a lot and feels well.  She has established with a new primary care doctor and is very pleased with this interaction.    She had previously been having a lot of uretheral discomfort and UTIs.  She is on vaginal estrogen and this has helped significantly.      She is active with exercise (walking, pilates, strength training). She eats healthy diet.        She has not noticed any changes in her breast tissue.  No nodules or masses.    Her 10-point review of systems is otherwise negative.         PHYSICAL EXAMINATION:  /65 (BP Location: Left arm, Patient Position: Sitting, Cuff Size: Adult Regular)   Pulse 61   Temp 98.3  F (36.8  C) (Oral)   Resp 16   Wt 78.7 kg (173 lb 9.6  oz)   SpO2 96%   BMI 28.02 kg/m    Gen: well appearing nad  Heent: no icterus  NECK: supple  CV: rrr  Lungs: clear  Abd; soft, nt, nd + bs  Ext: no edema  Breasts: scar in right breast with deviation of her right nipple, no nodules or masses, no abnormalities in the left breast    Dexa scan - 4% increase in hips since 2015, 7 % increase in lumbar spine.    MRI spine - degenerative changes, no metastatic disease     Reviewed her mammogram today personally with radiology.  Benign.   RECOMMENDED FOLLOW-UP: Annual routine screening mammogram    ASSESSMENT AND PLAN:    This 67-year-old woman with a T1b N0, ER/IN-positive and HER2-negative invasive ductal carcinoma, grade 1, involving the right breast, status post lumpectomy and right breast RTx. She is now on Tamoxifen for adjuvant endocrine therapy.     1.  Breast cancer.  [id number], stage IA, start tamoxifen Sept 2015, switched to letrozole July 2018 where she completed five years of treatment.  -Annual mammogram done, which was normal as above.   -She has completed five years of letrozole.    -We discussed having her remain off of letrozole now that she has completed five years.  -Annual mammogram; we reviewed follow up with  her PCP; I am happy to see her back at anytime.      2. 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     - repeat dexa scan in 2021. This demonstrates improvement in her bone density.  - Continue Ca++/vit D, strength exercises, as doing.      3. Diet and exercise. Reviewed goal for 150 minutes moderate intensity activity weekly. She is trying to increase her activity since the pandemic.      4. Vaginal dryness - improved on vaginal estrogen.    Delphine Alegria MD

## 2022-08-15 NOTE — LETTER
8/15/2022         RE: Namita Hernandez  [address] [place] [address]        Dear Colleague,    Thank you for referring your patient, Namita Hernandez, to the [facility]. Please see a copy of my visit note below.       ONCOLOGY FOLLOW UP VISIT  August 16, 2022       Namita returns today for followup of a stage I, T1b N0, ER/CT-positive and HER2-negative breast cancer now s/p curative intent therapy and no longer on adjuvant endocrine therapy.      HISTORY OF PRESENT ILLNESS:   Namita is a 66-year-old woman who comes in today for followup for her breast cancer. Briefly, Namita underwent a mammogram and ultrasound. Mammogram on 04/06/2015 suggested a 1.5 cm abnormal distortion confirmed by ultrasound.  By contrast mammography, this revealed an 18 mm area of abnormality at the 2 o'clock position involving her right breast.  Biopsy was recommended and confirmed an invasive ductal carcinoma, grade 1, ER/CT-positive and HER2-negative.  She underwent a lumpectomy and sentinel lymph node biopsy by Dr. Yoshi Oconnor.  Her final staging was a 6 mm tumor, T1b N0, ER/CT-positive and HER2-negative.  All margins were negative. She completed her RTx (52.4 Gy in 20 sessions 6/31-7/27). She started Tamoxifen in 09/2015. She switched to letrozole July 2018.     She is also on fosamax for bone health.    She completed 5 years in August 2020 and stopped letrozole at that time.      INTERVAL HISTORY:   She returns today in follow up and feels quite well.  She has been traveling a lot and feels well.  She has established with a new primary care doctor and is very pleased with this interaction.    She had previously been having a lot of uretheral discomfort and UTIs.  She is on vaginal estrogen and this has helped significantly.      She is active with exercise (walking, pilates, strength training). She eats healthy diet.        She has not noticed any changes in her breast tissue.  No nodules or masses.    Her  10-point review of systems is otherwise negative.         PHYSICAL EXAMINATION:  /65 (BP Location: Left arm, Patient Position: Sitting, Cuff Size: Adult Regular)   Pulse 61   Temp 98.3  F (36.8  C) (Oral)   Resp 16   Wt 78.7 kg (173 lb 9.6 oz)   SpO2 96%   BMI 28.02 kg/m    Gen: well appearing nad  Heent: no icterus  NECK: supple  CV: rrr  Lungs: clear  Abd; soft, nt, nd + bs  Ext: no edema  Breasts: scar in right breast with deviation of her right nipple, no nodules or masses, no abnormalities in the left breast    Dexa scan - 4% increase in hips since 2015, 7 % increase in lumbar spine.    MRI spine - degenerative changes, no metastatic disease     Reviewed her mammogram today personally with radiology.  Benign.   RECOMMENDED FOLLOW-UP: Annual routine screening mammogram    ASSESSMENT AND PLAN:    This 67-year-old woman with a T1b N0, ER/[place]-positive and HER2-negative invasive ductal carcinoma, grade 1, involving the right breast, status post lumpectomy and right breast RTx. She is now on Tamoxifen for adjuvant endocrine therapy.     1.  Breast cancer.  [id number], stage IA, start tamoxifen Sept 2015, switched to letrozole July 2018 where she completed five years of treatment.  -Annual mammogram done, which was normal as above.   -She has completed five years of letrozole.    -We discussed having her remain off of letrozole now that she has completed five years.  -Annual mammogram; we reviewed follow up with  her PCP; I am happy to see her back at anytime.      2. 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     - repeat dexa scan in 2021. This demonstrates improvement in her bone density.  - Continue Ca++/vit D, strength exercises, as doing.      3. Diet and exercise. Reviewed goal for 150 minutes moderate intensity activity weekly. She is trying to increase her activity since the pandemic.      4. Vaginal dryness - improved on vaginal estrogen.    Delphine Alegria MD

## 2022-08-18 ENCOUNTER — ANCILLARY PROCEDURE (OUTPATIENT)
Dept: MAMMOGRAPHY | Facility: CLINIC | Age: 67
End: 2022-08-18
Attending: INTERNAL MEDICINE
Payer: COMMERCIAL

## 2022-08-18 DIAGNOSIS — R92.8 ABNORMAL MAMMOGRAM OF LEFT BREAST: ICD-10-CM

## 2022-08-18 PROCEDURE — 77065 DX MAMMO INCL CAD UNI: CPT | Mod: LT | Performed by: STUDENT IN AN ORGANIZED HEALTH CARE EDUCATION/TRAINING PROGRAM

## 2022-08-18 PROCEDURE — G0279 TOMOSYNTHESIS, MAMMO: HCPCS | Mod: GC | Performed by: STUDENT IN AN ORGANIZED HEALTH CARE EDUCATION/TRAINING PROGRAM

## 2022-10-29 ENCOUNTER — HEALTH MAINTENANCE LETTER (OUTPATIENT)
Age: 67
End: 2022-10-29

## 2023-01-09 ENCOUNTER — PATIENT OUTREACH (OUTPATIENT)
Dept: ONCOLOGY | Facility: CLINIC | Age: 68
End: 2023-01-09

## 2023-01-09 NOTE — PROGRESS NOTES
"[facility]: Cancer Care                                                                                          Received voicemail from patient who is requesting an order for \"supplies\" to be sent to [facility].   Returned call to patient to get specific information on the exact supplies she would like ordered. Unable to reach, left voicemail asking for a return call.     Kay Sullivan MSN, RN ,OCN  RN Care Coordinator   [facility]  [phone]   "

## 2023-01-16 NOTE — PROGRESS NOTES
SUBJECTIVE:                                                   Namita Hernandez is a 67 year old female who presents to clinic today for the following health issue(s):  Patient presents with:  Vaginal Problem: Treated for a yeast culture at end of November with 2 doses of diflucan. Some vaginal itching still, just wants to make sure infection has resolved.      HPI:  Patient here today with concerns of cleared vaginal yeast infection.  She was seen by urgent care just before  and was treated with 2 Diflucan tablets for a vaginal yeast infection.  She has been doing very well with her vaginal estrogen cream using it 3 times weekly.  She has not had a UTI in over 1 year.    She has no vaginal symptoms that are bothersome to her today.  She did use her vaginal estrogen cream last evening.    No LMP recorded. Patient is postmenopausal.    Patient is not sexually active, .  Using not sexually active for contraception.    reports that she quit smoking about 37 years ago. Her smoking use included cigarettes. She has never used smokeless tobacco.    STD testing offered?  Declined    Health maintenance updated:  yes    Today's PHQ-2 Score:   PHQ-2 (  [employer]) 3/17/2021   Q1: Little interest or pleasure in doing things 0   Q2: Feeling down, depressed or hopeless 0   PHQ-2 Score 0   PHQ-2 Total Score (12-17 Years)- Positive if 3 or more points; Administer PHQ-A if positive 0     Today's PHQ-9 Score:   PHQ-9 SCORE 2023   PHQ-9 Total Score 2     Today's JL-7 Score:   JL-7 SCORE 2023   Total Score 0       Problem list and histories reviewed & adjusted, as indicated.  Additional history: as documented.    Patient Active Problem List   Diagnosis     Breast cancer, right breast (H)     Multiple thyroid nodules     Cervical adenopathy     Elbow pain, left     Right shoulder pain     Hypovitaminosis D     Senile osteoporosis     Parathyroid abnormality (H)     History of colonic polyps     Endometrial  polyp     Past Surgical History:   Procedure Laterality Date     DILATION AND CURETTAGE, OPERATIVE HYSTEROSCOPY WITH MORCELLATOR, COMBINED N/A 2019    Procedure: HYSTEROSCOPY DILATION AND CURRETAGE WITH MYOSURE;  Surgeon: Sushila Melendez MD;  Location: [facility] OR     EXCISION BENIGN LESION COMPL Right     Abdominal lipoma removed      EYE SURGERY  1960    strabismus     LUMPECTOMY BREAST WITH SENTINEL NODE, COMBINED Right 2015    Procedure: COMBINED LUMPECTOMY BREAST WITH SENTINEL NODE;  Surgeon: Yoshi Oconnor MD;  Location:  OR      Social History     Tobacco Use     Smoking status: Former     Packs/day: 0.00     Types: Cigarettes     Quit date: 1986     Years since quittin.0     Smokeless tobacco: Never   Substance Use Topics     Alcohol use: Yes     Comment: Rare- glass of wine      Problem (# of Occurrences) Relation (Name,Age of Onset)    Diabetes (1) Maternal Grandfather    Thyroid Disease (1) Mother: nodlue    Breast Cancer (2) Maternal Grandmother: late 50s, Mother: late 60s    Meniere's disease (1) Father       Negative family history of: Thyroid Cancer, Glaucoma, Macular Degeneration, Retinal detachment, Melanoma, Skin Cancer            Current Outpatient Medications   Medication Sig     alendronate (FOSAMAX) 70 MG tablet TAKE 1 TABLET BY MOUTH ONE TIME PER WEEK     CALCIUM PO Take 2-4 tablets by mouth daily      cholecalciferol (VITAMIN D) 1000 UNIT tablet Take by mouth daily     estradiol (ESTRACE) 0.1 MG/GM vaginal cream Place 2 g vaginally four times a week (Patient taking differently: Place 2 g vaginally three times a week)     No current facility-administered medications for this visit.     Allergies   Allergen Reactions     Sulfa Drugs Hives       ROS:  12 point review of systems negative other than symptoms noted below or in the HPI.  Genitourinary: Vaginal Itching  No urinary frequency or dysuria, bladder or kidney problems, POSITIVE for:, vaginal itching or  "burning      OBJECTIVE:     /68   Ht 1.676 m (5' 6\")   Wt 77.6 kg (171 lb)   BMI 27.60 kg/m    Body mass index is 27.6 kg/m .    Exam:  Constitutional:  Appearance: Well nourished, well developed alert, in no acute distress  Psychiatric:  Mentation appears normal and affect normal/bright.  Pelvic Exam:  External Genitalia:     Normal appearance for age, no discharge present, no tenderness present, no inflammatory lesions present, color normal.  White cream at the introitus  Vagina:     Normal vaginal vault without central or paravaginal defects, no discharge present, no inflammatory lesions present, no masses present.  White cream  Urethra:   Urethral Meatus:  No erythema or lesions present  Cervix:     Appearance healthy, no lesions present, nontender to palpation, no bleeding present  Perineum:     Perineum within normal limits, no evidence of trauma, no rashes or skin lesions present  Anus:     Anus within normal limits, no hemorrhoids present  Inguinal Lymph Nodes:     No lymphadenopathy present  Pubic Hair:     Normal pubic hair distribution for age  Genitalia and Groin:     No rashes present, no lesions present, no areas of discoloration, no masses present       In-Clinic Test Results:  Results for orders placed or performed in visit on 01/17/23 (from the past 24 hour(s))   Bacterial Vaginosis Smear    Specimen: Vagina; Swab    Narrative    The following orders were created for panel order Bacterial Vaginosis Smear.  Procedure                               Abnormality         Status                     ---------                               -----------         ------                     Bacterial Vaginosis Stain[[id number]]                                                     Please view results for these tests on the individual orders.       ASSESSMENT/PLAN:                                                        ICD-10-CM    1. Vaginal irritation  N89.8 Bacterial Vaginosis Smear     Yeast culture    "       There are no Patient Instructions on file for this visit.    67-year-old postmenopausal female with a normal GYN exam.  It is hard to tell if the discharge on exam is remnant of vaginal estrogen cream versus yeast infection.  Vaginal cultures will be sent.    JEY Wilson CNP  [facility]

## 2023-01-17 ENCOUNTER — OFFICE VISIT (OUTPATIENT)
Dept: OBGYN | Facility: CLINIC | Age: 68
End: 2023-01-17
Payer: COMMERCIAL

## 2023-01-17 VITALS
BODY MASS INDEX: 27.48 KG/M2 | WEIGHT: 171 LBS | SYSTOLIC BLOOD PRESSURE: 118 MMHG | DIASTOLIC BLOOD PRESSURE: 68 MMHG | HEIGHT: 66 IN

## 2023-01-17 DIAGNOSIS — N89.8 VAGINAL IRRITATION: Primary | ICD-10-CM

## 2023-01-17 LAB
NUGENT SCORE: 0
WHITE BLOOD CELLS: NORMAL

## 2023-01-17 PROCEDURE — 87205 SMEAR GRAM STAIN: CPT | Performed by: NURSE PRACTITIONER

## 2023-01-17 PROCEDURE — 87102 FUNGUS ISOLATION CULTURE: CPT | Performed by: NURSE PRACTITIONER

## 2023-01-17 PROCEDURE — 99213 OFFICE O/P EST LOW 20 MIN: CPT | Performed by: NURSE PRACTITIONER

## 2023-01-17 ASSESSMENT — ANXIETY QUESTIONNAIRES
3. WORRYING TOO MUCH ABOUT DIFFERENT THINGS: NOT AT ALL
6. BECOMING EASILY ANNOYED OR IRRITABLE: NOT AT ALL
1. FEELING NERVOUS, ANXIOUS, OR ON EDGE: NOT AT ALL
GAD7 TOTAL SCORE: 0
GAD7 TOTAL SCORE: 0
5. BEING SO RESTLESS THAT IT IS HARD TO SIT STILL: NOT AT ALL
2. NOT BEING ABLE TO STOP OR CONTROL WORRYING: NOT AT ALL
7. FEELING AFRAID AS IF SOMETHING AWFUL MIGHT HAPPEN: NOT AT ALL

## 2023-01-17 ASSESSMENT — PATIENT HEALTH QUESTIONNAIRE - PHQ9
SUM OF ALL RESPONSES TO PHQ QUESTIONS 1-9: 2
5. POOR APPETITE OR OVEREATING: NOT AT ALL

## 2023-01-18 DIAGNOSIS — Z85.3 HX OF BREAST CANCER: Primary | ICD-10-CM

## 2023-01-18 DIAGNOSIS — C50.911 MALIGNANT NEOPLASM OF RIGHT BREAST IN FEMALE, ESTROGEN RECEPTOR POSITIVE, UNSPECIFIED SITE OF BREAST (H): ICD-10-CM

## 2023-01-18 DIAGNOSIS — Z17.0 MALIGNANT NEOPLASM OF RIGHT BREAST IN FEMALE, ESTROGEN RECEPTOR POSITIVE, UNSPECIFIED SITE OF BREAST (H): ICD-10-CM

## 2023-01-21 LAB — BACTERIA SPEC CULT: NO GROWTH

## 2023-01-31 NOTE — TELEPHONE ENCOUNTER
Central Prior Authorization Team   Phone: [phone]      PA Initiation    Medication: ospemifene (OSPHENA) 60 MG tablet-Initiated  Insurance Company: Other (see comments)Comment:  Rigoberto garcia [phone]  Pharmacy Filling the Rx: [id number] - [place], [place] - [address]  Filling Pharmacy Phone: [phone]  Filling Pharmacy Fax:    Start Date: 12/12/2019             I tried calling patient's mane,not in service.Called [facility]  tried to transfer the call but patient did not .

## 2023-06-20 ENCOUNTER — OFFICE VISIT (OUTPATIENT)
Dept: OBGYN | Facility: CLINIC | Age: 68
End: 2023-06-20
Payer: COMMERCIAL

## 2023-06-20 VITALS
DIASTOLIC BLOOD PRESSURE: 64 MMHG | HEIGHT: 66 IN | BODY MASS INDEX: 27.8 KG/M2 | WEIGHT: 173 LBS | SYSTOLIC BLOOD PRESSURE: 119 MMHG

## 2023-06-20 DIAGNOSIS — N89.8 ITCHING OF VAGINA: Primary | ICD-10-CM

## 2023-06-20 DIAGNOSIS — B37.9 YEAST INFECTION: ICD-10-CM

## 2023-06-20 PROCEDURE — 87801 DETECT AGNT MULT DNA AMPLI: CPT | Performed by: NURSE PRACTITIONER

## 2023-06-20 PROCEDURE — 87661 TRICHOMONAS VAGINALIS AMPLIF: CPT | Mod: 59 | Performed by: NURSE PRACTITIONER

## 2023-06-20 PROCEDURE — 87102 FUNGUS ISOLATION CULTURE: CPT | Performed by: NURSE PRACTITIONER

## 2023-06-20 PROCEDURE — 87106 FUNGI IDENTIFICATION YEAST: CPT | Performed by: NURSE PRACTITIONER

## 2023-06-20 PROCEDURE — 99213 OFFICE O/P EST LOW 20 MIN: CPT | Performed by: NURSE PRACTITIONER

## 2023-06-20 PROCEDURE — 87481 CANDIDA DNA AMP PROBE: CPT | Mod: 59 | Performed by: NURSE PRACTITIONER

## 2023-06-20 NOTE — PROGRESS NOTES
SUBJECTIVE:                                                   Namita Hernandez is a 68 year old female who presents to clinic today for the following health issue(s):  Patient presents with:  Vaginal Problem      HPI:  Patient here today with concerns of vaginal irritation.  She was treated with antibiotics for both a sinus infection and a UTI.  She has no ongoing UTI symptoms at this time.  She is no pelvic cramping or bleeding.  She has not self treated with Monistat.  She does use her vaginal estrogen cream on a routine basis.    No LMP recorded. Patient is postmenopausal..     Patient is not sexually active, .  Using menopause for contraception.    reports that she quit smoking about 37 years ago. Her smoking use included cigarettes. She has never used smokeless tobacco.    STD testing offered?  Declined    Health maintenance updated:  See Care Gaps     Today's PHQ-2 Score:       3/17/2021     2:13 PM   PHQ-2 (  [employer])   Q1: Little interest or pleasure in doing things 0   Q2: Feeling down, depressed or hopeless 0   PHQ-2 Score 0   PHQ-2 Total Score (12-17 Years)- Positive if 3 or more points; Administer PHQ-A if positive 0     Today's PHQ-9 Score:       2023    11:04 AM   PHQ-9 SCORE   PHQ-9 Total Score 2     Today's JL-7 Score:       2023    11:04 AM   JL-7 SCORE   Total Score 0       Problem list and histories reviewed & adjusted, as indicated.  Additional history: as documented.    Patient Active Problem List   Diagnosis     Breast cancer, right breast (H)     Multiple thyroid nodules     Cervical adenopathy     Elbow pain, left     Right shoulder pain     Hypovitaminosis D     Senile osteoporosis     Parathyroid abnormality (H)     History of colonic polyps     Endometrial polyp     Past Surgical History:   Procedure Laterality Date     DILATION AND CURETTAGE, OPERATIVE HYSTEROSCOPY WITH MORCELLATOR, COMBINED N/A 2019    Procedure: HYSTEROSCOPY DILATION AND CURRETAGE WITH MYOSURE;   "Surgeon: Sushila Melendez MD;  Location: [facility] OR     EXCISION BENIGN LESION COMPL Right     Abdominal lipoma removed      EYE SURGERY  1960    strabismus     LUMPECTOMY BREAST WITH SENTINEL NODE, COMBINED Right 2015    Procedure: COMBINED LUMPECTOMY BREAST WITH SENTINEL NODE;  Surgeon: Yoshi Oconnor MD;  Location:  OR      Social History     Tobacco Use     Smoking status: Former     Packs/day: 0.00     Types: Cigarettes     Quit date: 1986     Years since quittin.4     Smokeless tobacco: Never   Vaping Use     Vaping status: Not on file   Substance Use Topics     Alcohol use: Yes     Comment: Rare- glass of wine      Problem (# of Occurrences) Relation (Name,Age of Onset)    Diabetes (1) Maternal Grandfather    Thyroid Disease (1) Mother: nodlue    Breast Cancer (2) Maternal Grandmother: late 50s, Mother: late 60s    Meniere's disease (1) Father       Negative family history of: Thyroid Cancer, Glaucoma, Macular Degeneration, Retinal detachment, Melanoma, Skin Cancer            Current Outpatient Medications   Medication Sig     alendronate (FOSAMAX) 70 MG tablet TAKE 1 TABLET BY MOUTH ONE TIME PER WEEK     CALCIUM PO Take 2-4 tablets by mouth daily      cholecalciferol (VITAMIN D) 1000 UNIT tablet Take by mouth daily     estradiol (ESTRACE) 0.1 MG/GM vaginal cream Place 2 g vaginally four times a week (Patient taking differently: Place 2 g vaginally three times a week)     No current facility-administered medications for this visit.     Allergies   Allergen Reactions     Sulfa Antibiotics Hives       ROS:  12 point review of systems negative other than symptoms noted below or in the HPI.  Genitourinary: Vaginal Itching  No urinary frequency or dysuria, bladder or kidney problems      OBJECTIVE:     /64   Ht 1.676 m (5' 6\")   Wt 78.5 kg (173 lb)   BMI 27.92 kg/m    Body mass index is 27.92 kg/m .    Exam:  Constitutional:  Appearance: Well nourished, well developed " alert, in no acute distress  Psychiatric:  Mentation appears normal and affect normal/bright.  Pelvic Exam:  External Genitalia:     Normal appearance for age, no discharge present, no tenderness present, no inflammatory lesions present, color normal  Vagina:     Normal vaginal vault without central or paravaginal defects, no discharge present, no inflammatory lesions present, no masses present  Urethra:   Urethral Meatus:  No erythema or lesions present  Cervix:     Appearance healthy, no lesions present, nontender to palpation, no bleeding present  Perineum:     Perineum within normal limits, no evidence of trauma, no rashes or skin lesions present  Anus:     Anus within normal limits, no hemorrhoids present  Inguinal Lymph Nodes:     No lymphadenopathy present  Pubic Hair:     Normal pubic hair distribution for age  Genitalia and Groin:     No rashes present, no lesions present, no areas of discoloration, no masses present       In-Clinic Test Results:  No results found for this or any previous visit (from the past 24 hour(s)).    ASSESSMENT/PLAN:                                                        ICD-10-CM    1. Itching of vagina  N89.8 Multiplex Vaginal Panel by PCR      2. Yeast infection  B37.9 Yeast culture          There are no Patient Instructions on file for this visit.    68-year-old postmenopausal female with a normal postmenopausal GYN exam.  She has no exam findings of vaginitis.  Vaginal cultures will be sent and we will treat if needed.    JEY Wilson CNP  [facility] [place]

## 2023-06-21 LAB
BACTERIAL VAGINOSIS VAG-IMP: NEGATIVE
CANDIDA DNA VAG QL NAA+PROBE: NOT DETECTED
CANDIDA GLABRATA / CANDIDA KRUSEI DNA: NOT DETECTED
T VAGINALIS DNA VAG QL NAA+PROBE: NOT DETECTED

## 2023-06-23 RX ORDER — FLUCONAZOLE 150 MG/1
150 TABLET ORAL
Qty: 2 TABLET | Refills: 0 | Status: SHIPPED | OUTPATIENT
Start: 2023-06-23

## 2023-06-24 LAB — BACTERIA VAG AEROBE CULT: ABNORMAL

## 2023-07-17 ENCOUNTER — MYC MEDICAL ADVICE (OUTPATIENT)
Dept: OBGYN | Facility: CLINIC | Age: 68
End: 2023-07-17
Payer: COMMERCIAL

## 2023-07-17 DIAGNOSIS — N89.8 ITCHING OF VAGINA: Primary | ICD-10-CM

## 2023-07-17 NOTE — TELEPHONE ENCOUNTER
Routing pt [employer] message to provider to advise.    Aracely Villatoro RN on 7/17/2023 at 1:18 PM

## 2023-07-18 RX ORDER — KETOCONAZOLE 200 MG/1
200 TABLET ORAL DAILY
Qty: 7 TABLET | Refills: 0 | Status: SHIPPED | OUTPATIENT
Start: 2023-07-18

## 2023-07-19 NOTE — TELEPHONE ENCOUNTER
See [employer] message. Routing to Aracely KHANNA CNP to review and advise.  Evangelina French RN on 2023 at 12:07 PM      Info from [employer]:  Concurrent use of FLUTICASONE and STRONG CY INHIBITORS may result in increased fluticasone exposur

## 2023-08-17 ENCOUNTER — HOSPITAL ENCOUNTER (OUTPATIENT)
Age: 68
Discharge: HOME | End: 2023-08-17
Payer: MEDICARE

## 2023-08-17 VITALS
DIASTOLIC BLOOD PRESSURE: 87 MMHG | TEMPERATURE: 99 F | SYSTOLIC BLOOD PRESSURE: 153 MMHG | RESPIRATION RATE: 18 BRPM | HEART RATE: 68 BPM | OXYGEN SATURATION: 99 %

## 2023-08-17 DIAGNOSIS — R30.0 DYSURIA: Primary | ICD-10-CM

## 2023-08-17 DIAGNOSIS — N30.01 ACUTE CYSTITIS WITH HEMATURIA: ICD-10-CM

## 2023-08-17 LAB
BILIRUB UR QL STRIP: NEGATIVE
COLOR UR: YELLOW
GLUCOSE UR STRIP-MCNC: NEGATIVE MG/DL
KETONES UR STRIP-MCNC: NEGATIVE MG/DL
NITRITE UR QL STRIP: NEGATIVE
PH UR STRIP: 8 [PH]
PROT UR STRIP-MCNC: NEGATIVE MG/DL
SP GR UR STRIP: 1.01
UROBILINOGEN UR STRIP-ACNC: <2 MG/DL

## 2023-08-17 PROCEDURE — 87205 SMEAR GRAM STAIN: CPT | Performed by: NURSE PRACTITIONER

## 2023-08-17 PROCEDURE — 87086 URINE CULTURE/COLONY COUNT: CPT | Performed by: NURSE PRACTITIONER

## 2023-08-17 PROCEDURE — 87106 FUNGI IDENTIFICATION YEAST: CPT | Performed by: NURSE PRACTITIONER

## 2023-08-17 PROCEDURE — 87808 TRICHOMONAS ASSAY W/OPTIC: CPT | Performed by: NURSE PRACTITIONER

## 2023-08-17 RX ORDER — ESTRADIOL 0.1 MG/G
CREAM VAGINAL
COMMUNITY

## 2023-08-17 RX ORDER — CEFPODOXIME PROXETIL 100 MG/1
100 TABLET, FILM COATED ORAL 2 TIMES DAILY
Qty: 14 TABLET | Refills: 0 | Status: SHIPPED | OUTPATIENT
Start: 2023-08-17 | End: 2023-08-24

## 2023-08-17 NOTE — ED INITIAL ASSESSMENT (HPI)
Pt c/o urinary urgency and frequency intermittently since July, worse x3-4 days, dysuria today. No fever. Last UTI 7/2023. States having yeast infections-last 1 mos ago. States completed diflucan x2 doses. States having vaginal irritation and burning now. No vaginal discharge.

## 2023-08-17 NOTE — DISCHARGE INSTRUCTIONS
Urine sample here shows infection. Urine and vaginal cultures are pending. Results will be available in about 2 days. We will call you with any positive results. Your results will also show up in [address] Take the antibiotic twice a day until gone. Use the vaginal cream as directed. Continue estradiol. Increase water intake.   Follow-up with gynecology

## 2023-08-19 ENCOUNTER — HOSPITAL ENCOUNTER (OUTPATIENT)
Age: 68
Discharge: HOME | End: 2023-08-19
Payer: MEDICARE

## 2023-08-19 VITALS
RESPIRATION RATE: 16 BRPM | DIASTOLIC BLOOD PRESSURE: 75 MMHG | HEART RATE: 88 BPM | SYSTOLIC BLOOD PRESSURE: 152 MMHG | OXYGEN SATURATION: 96 % | TEMPERATURE: 99 F

## 2023-08-19 DIAGNOSIS — R35.0 URINARY FREQUENCY: Primary | ICD-10-CM

## 2023-08-19 LAB
BILIRUB UR QL STRIP: NEGATIVE
COLOR UR: YELLOW
GENITAL VAGINOSIS SCREEN: NEGATIVE
GLUCOSE BLDC GLUCOMTR-MCNC: 112 MG/DL (ref 70–99)
GLUCOSE UR STRIP-MCNC: NEGATIVE MG/DL
KETONES UR STRIP-MCNC: NEGATIVE MG/DL
LEUKOCYTE ESTERASE UR QL STRIP: NEGATIVE
NITRITE UR QL STRIP: NEGATIVE
PH UR STRIP: 6 [PH]
PROT UR STRIP-MCNC: NEGATIVE MG/DL
SP GR UR STRIP: >=1.03
TRICHOMONAS SCREEN: NEGATIVE
UROBILINOGEN UR STRIP-ACNC: <2 MG/DL

## 2023-08-19 PROCEDURE — 82962 GLUCOSE BLOOD TEST: CPT | Performed by: NURSE PRACTITIONER

## 2023-08-19 PROCEDURE — 99214 OFFICE O/P EST MOD 30 MIN: CPT | Performed by: NURSE PRACTITIONER

## 2023-08-19 PROCEDURE — 81002 URINALYSIS NONAUTO W/O SCOPE: CPT | Performed by: NURSE PRACTITIONER

## 2023-08-19 RX ORDER — PHENAZOPYRIDINE HYDROCHLORIDE 200 MG/1
200 TABLET, FILM COATED ORAL 3 TIMES DAILY PRN
Qty: 10 TABLET | Refills: 0 | Status: SHIPPED | OUTPATIENT
Start: 2023-08-19

## 2023-08-19 RX ORDER — NITROFURANTOIN 25; 75 MG/1; MG/1
100 CAPSULE ORAL 2 TIMES DAILY
Qty: 14 CAPSULE | Refills: 0 | Status: SHIPPED | OUTPATIENT
Start: 2023-08-19 | End: 2023-08-26

## 2023-08-19 NOTE — DISCHARGE INSTRUCTIONS
Previous urine and vaginal cultures were negative for growth. Repeat urine culture is pending. You may switch the antibiotic to macrobid. Take pyridium as needed for urgency. Increase water intake. Continue estradiol. Add a vaginal moisturizer like vagisil. Keep the area open to air.    Call urogynecology for further evaluation [phone]

## 2023-09-03 ENCOUNTER — HEALTH MAINTENANCE LETTER (OUTPATIENT)
Age: 68
End: 2023-09-03

## 2024-02-29 NOTE — ANESTHESIA CARE TRANSFER NOTE
Patient: Namita Hernandez    Procedure(s):  HYSTEROSCOPY DILATION AND CURRETAGE WITH MYOSURE    Diagnosis: Endometrial polyp [N84.0]  Diagnosis Additional Information: No value filed.    Anesthesia Type:   General, LMA     Note:  Airway :Face Mask  Patient transferred to:PACU  Comments: At end of procedure, spontaneous respirations, adequate tidal volumes, followed commands to voice, LMA removed atraumatically, airway patent after LMA removal. Oxygen via facemask at 6 liters per minute to PACU. Oxygen tubing connected to wall O2 in PACU, SpO2, NiBP, and EKG monitors and alarms on and functioning, report on patient's clinical status given to PACU RN, RN questions answered.Handoff Report: Identifed the Patient, Identified the Reponsible Provider, Reviewed the pertinent medical history, Discussed the surgical course, Reviewed Intra-OP anesthesia mangement and issues during anesthesia, Set expectations for post-procedure period and Allowed opportunity for questions and acknowledgement of understanding      Vitals: (Last set prior to Anesthesia Care Transfer)    CRNA VITALS  12/18/2019 0824 - 12/18/2019 0859      12/18/2019             Pulse:  77    SpO2:  100 %    Resp Rate (set):  10                Electronically Signed By: JEY Patterson CRNA  December 18, 2019  8:59 AM  
x1 to monitor tolerance of current recommended diet

## 2024-03-31 ENCOUNTER — HEALTH MAINTENANCE LETTER (OUTPATIENT)
Age: 69
End: 2024-03-31

## 2024-04-15 NOTE — LETTER
"    11/12/2020         RE: Namita Hernandez  [address] [place] [address]        Dear Colleague,    Thank you for referring your patient, Namita Hernandez, to the [facility]. Please see a copy of my visit note below.    Namita Hernandez is a 65 year old female who is being evaluated via a billable telephone visit.      The patient has been notified of following:     \"This telephone visit will be conducted via a call between you and your physician/provider. We have found that certain health care needs can be provided without the need for a physical exam.  This service lets us provide the care you need with a short phone conversation.  If a prescription is necessary we can send it directly to your pharmacy.  If lab work is needed we can place an order for that and you can then stop by our lab to have the test done at a later time.    Telephone visits are billed at different rates depending on your insurance coverage. During this emergency period, for some insurers they may be billed the same as an in-person visit.  Please reach out to your insurance provider with any questions.    If during the course of the call the physician/provider feels a telephone visit is not appropriate, you will not be charged for this service.\"    Patient has given verbal consent for Telephone visit?  Yes    What phone number would you like to be contacted at? [phone]    How would you like to obtain your AVS? Jone Navarrete [facility]    Phone call duration: 15 minutes    Delphine Alegria MD    ONCOLOGY FOLLOW UP VISIT  November 12, 2020     Namita returns today for followup of a stage I, T1b N0, ER/SD-positive and HER2-negative breast cancer.      HISTORY OF PRESENT ILLNESS:   Namita is a 62-year-old woman who comes in today for followup for her breast cancer. Briefly, Namita underwent a mammogram and ultrasound. Mammogram on 04/06/2015 suggested a 1.5 cm abnormal distortion confirmed by ultrasound.  By contrast " Assessment/Plan:     Class 2 severe obesity due to excess calories with serious comorbidity and body mass index (BMI) of 35.0 to 35.9 in adult (HCC)  - Patient is pursuing Conservative Program and follow up visits with medical weight management provider  - Initial weight loss goal of 5-10% weight loss for improved health  -Patient lifestyle habits were reviewed.  Patient will continue to be mindful of her nutrition and balance her calories given throughout the day to avoid skipping meals.  Patient will continue to focus on portion control and try to stay within a calorie range of 1000 to 1200 isamar/day.  Activity was discussed and patient was congratulated on starting a more regular exercise routine.  Medications were discussed and patient is tolerating bupropion/naltrexone with minimal side effects and patient notices a benefit from the medication.  Patient will maintain on this therapy and recheck in 2 months.      Goals:  Calorie Goal: 9378-7793 calories per day  Do not skip meals.  Food log via yung or provided paper log (yung options include www.myfitnesspal.com, sparkpeople.com, loseit.com, calorieking.com, HomeCon)  No sugary beverages.   At least 64oz of water daily.  Increase physical activity by 10 minutes daily. Gradually increase physical activity to a goal of 5 days per week for 30 minutes of MODERATE intensity PLUS 2 days per week of FULL BODY resistance training          Marina was seen today for follow-up.    Diagnoses and all orders for this visit:    Class 2 severe obesity due to excess calories with serious comorbidity and body mass index (BMI) of 35.0 to 35.9 in adult (HCC)    Obesity, Class II, BMI 35-39.9        Total time spent reviewing chart, interviewing patient, examining patient, discussing plan, answering all questions, and documentin minutes with >50% face-to-face time with the patient.    Follow up in approximately 2 months with Non-Surgical Physician/Advanced  Practitioner.    Subjective:   Chief Complaint   Patient presents with    Follow-up     Pt is here for MWM f/u       Patient ID: Marina Bhagat  is a 38 y.o. female with excess weight/obesity here to pursue weight management.  Patient is pursuing Conservative Program.   Most recent notes and records were reviewed.    HPI    Wt Readings from Last 20 Encounters:   04/15/24 80.3 kg (177 lb)   02/12/24 79.7 kg (175 lb 9.6 oz)   12/12/23 79.5 kg (175 lb 3.2 oz)   11/13/23 77.1 kg (170 lb)   08/14/23 75.8 kg (167 lb 3.2 oz)   07/12/23 77.5 kg (170 lb 12.8 oz)   05/26/23 77 kg (169 lb 12.8 oz)   04/26/23 78.1 kg (172 lb 3.2 oz)   04/17/23 78.3 kg (172 lb 9.6 oz)   03/28/23 78.7 kg (173 lb 9.6 oz)   02/28/23 79.7 kg (175 lb 12.8 oz)   02/21/23 78.4 kg (172 lb 12.8 oz)   01/24/23 81.1 kg (178 lb 12.8 oz)   01/23/23 81.5 kg (179 lb 9.6 oz)   11/01/22 82.2 kg (181 lb 3.2 oz)   10/24/22 81.7 kg (180 lb 3.2 oz)   06/28/22 78.9 kg (174 lb)   06/06/22 79 kg (174 lb 3.2 oz)   05/26/22 78 kg (172 lb)   04/11/22 76.3 kg (168 lb 3.2 oz)       Patient presents today to medical weight management office for follow up.  Patient was started on bupropion/naltrexone since last office visit and does feel like the medication is helping.  Patient states she has less desire for food and has been able to better control her portions with the addition of the medication.  She has noted occasional nausea, most common with sweets or citric foods.  Patient states her symptoms are tolerable and would like to continue with this therapy.  Patient states she is finally on the right track with getting control.  Patient was denied by her insurance for the coverage of Mounjaro for PCOS and does not have coverage for weight loss GLP-1 medications.  Patient did not tolerate metformin in the past due to GI upset.  Patient has also started a more regular exercise routine including barre classes.      Weight loss medication and dose: none since Oct  Initial  mammography, this revealed an 18 mm area of abnormality at the 2 o'clock position involving her right breast.  Biopsy was recommended and confirmed an invasive ductal carcinoma, grade 1, ER/KY-positive and HER2-negative.  She underwent a lumpectomy and sentinel lymph node biopsy by Dr. Yoshi Oconnor.  Her final staging was a 6 mm tumor, T1b N0, ER/KY-positive and HER2-negative.  All margins were negative. She completed her RTx (52.4 Gy in 20 sessions 6/31-7/27). She started Tamoxifen in 09/2015. She switched to letrozole July 2018.     She is also on fosamax for bone health.    INTERVAL HISTORY:   She returns today in follow up and feels quite well. She has been primarily quarantined at home.      She is walking a neighbor's dog regularly.      She is tolerating letrozole well. At first, she noticed some body aches in the arms, but it resolved quickly. Since that time, she has had no arthralgias or myalgias, no sites of pain anywhere. No hot flashes.     She is active with exercise (walking, pilates, strength training). She eats healthy diet.       She completed 5 years in August 2020 and stopped letrozole at that time.      Her vaginal dryness has improved.  She is also on an estrogen cream that has helped.    Her 10-point review of systems is otherwise negative.         PHYSICAL EXAMINATION:  Deferred due to the covid pandemic  Speaking clearly, and fluently    Mammogram August 2020:   IMPRESSION: BI-RADS CATEGORY: 2 - Benign Finding(s).  RECOMMENDED FOLLOW-UP: Annual Mammography.    Reviewed labs from September 2020    ASSESSMENT AND PLAN:    This 62-year-old woman with a T1b N0, ER/KY-positive and HER2-negative invasive ductal carcinoma, grade 1, involving the right breast, status post lumpectomy and right breast RTx. She is now on Tamoxifen for adjuvant endocrine therapy.     1.  Breast cancer.  [id number], stage IA, start tamoxifen Sept 2015, switched to letrozole July 2018 with plan to complete at least 5 years of  "weight: 174.2 lbs in 6/2022  Current weight: 177 lbs (175.6 lbs last MWM OV)  Change in weight: +2.8 lbs (+1.6 lbs since lat OV)  Goal: 120 lbs    Starting BMI: 34.6 in 6/2022  Current BMI: 35.15    Waist Measurements:  8/2023: 35 in  12/2023: 37.5 in      Nutrition Prescription  Calories: 1,000-1,200 kcal  Protein: 60-85 g  Fluid: 52+ ounces      B- oatmeal OR skips OR fruit (banana)  L- yogurt with unsweetened apple sauce, 2 cheese sticks OR PB&J on whole grain  S- humus with baby carrots  D- eggs with veggies OR ramirez with chick peas and rice  S- popcorn    Hydration- black tea in the morning, water - 4 cups  Alcohol- rarely  Exercise- 30 minutes 5 days a week and 15 minute walk with dog      The following portions of the patient's history were reviewed and updated as appropriate: allergies, current medications, past family history, past medical history, past social history, past surgical history, and problem list.    Family History   Problem Relation Age of Onset    Depression Mother     Colorectal Cancer Mother 65    Basal cell carcinoma Father     No Known Problems Brother     Colon cancer Maternal Uncle     Breast cancer Cousin     Ovarian cancer Neg Hx         Review of Systems   Constitutional:  Negative for fatigue.   HENT:  Negative for sore throat.    Respiratory:  Negative for cough and shortness of breath.    Cardiovascular:  Negative for chest pain, palpitations and leg swelling.   Gastrointestinal:  Negative for abdominal pain, constipation, diarrhea and nausea.   Genitourinary:  Negative for dysuria.   Musculoskeletal:  Negative for arthralgias and back pain.   Skin:  Negative for rash.   Neurological:  Negative for headaches.   Psychiatric/Behavioral:  Negative for dysphoric mood. The patient is not nervous/anxious.        Objective:  /70 (BP Location: Left arm, Patient Position: Sitting, Cuff Size: Large)   Pulse 93   Ht 4' 11.5\" (1.511 m)   Wt 80.3 kg (177 lb)   LMP 04/05/2024   BMI " endocrine therapy. She is tolerating the AI without any issues.   -Annual mammogram done in August 2020, which was normal as above.   -She has completed five years of letrozole.    -We discussed having her remain off of letrozole now that she has completed five years.      2. 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     - repeat dexa scan in 2021.  I advised she continue fosamax til then.  - Continue Ca++/vit D, strength exercises, as doing.     3. Diet and exercise. Reviewed goal for 150 minutes moderate intensity activity weekly. She is working on her diet with weight loss goal in place.     4. Vaginal dryness - she has tried estradiol 3x per week with minimal improvement.  She tried osphena and did not think this improved either.  Overall this is improving with being off of the AI and with her estrogen cream.    Delphine Alegria MD       35.15 kg/m²     Physical Exam  Vitals and nursing note reviewed.   Constitutional:       Appearance: Normal appearance. She is obese.   HENT:      Head: Normocephalic.   Pulmonary:      Effort: Pulmonary effort is normal.   Neurological:      General: No focal deficit present.      Mental Status: She is alert and oriented to person, place, and time.   Psychiatric:         Mood and Affect: Mood normal.         Behavior: Behavior normal.         Thought Content: Thought content normal.         Judgment: Judgment normal.            Labs   Most recent labs reviewed   Lab Results   Component Value Date    SODIUM 134 (L) 01/10/2023    K 3.3 (L) 01/10/2023     01/10/2023    CO2 23 01/10/2023    AGAP 8 01/10/2023    BUN 10 01/10/2023    CREATININE 0.57 (L) 01/10/2023    GLUC 118 01/10/2023    CALCIUM 8.2 (L) 01/10/2023    AST 16 01/10/2023    ALT 11 01/10/2023    ALKPHOS 56 01/10/2023    TP 6.5 01/10/2023    TBILI 0.44 01/10/2023    EGFR 120 01/10/2023     Lab Results   Component Value Date    HGBA1C 5.6 02/20/2024     Lab Results   Component Value Date    TSH 2.49 06/07/2022     Lab Results   Component Value Date    CHOLESTEROL 210 (H) 02/20/2024     Lab Results   Component Value Date     02/20/2024     Lab Results   Component Value Date    TRIG 63 02/20/2024     Lab Results   Component Value Date    LDLCALC 94 02/20/2024

## 2024-10-25 ENCOUNTER — OFFICE VISIT (OUTPATIENT)
Dept: OPHTHALMOLOGY | Facility: CLINIC | Age: 69
End: 2024-10-25
Payer: COMMERCIAL

## 2024-10-25 DIAGNOSIS — H04.123 DRY EYES, BILATERAL: ICD-10-CM

## 2024-10-25 DIAGNOSIS — H52.13 MYOPIA OF BOTH EYES: ICD-10-CM

## 2024-10-25 DIAGNOSIS — D31.32 NEVUS OF CHOROID OF LEFT EYE: ICD-10-CM

## 2024-10-25 DIAGNOSIS — H00.12 CHALAZION OF RIGHT LOWER EYELID: Primary | ICD-10-CM

## 2024-10-25 DIAGNOSIS — H52.4 PRESBYOPIA: ICD-10-CM

## 2024-10-25 PROCEDURE — 92004 COMPRE OPH EXAM NEW PT 1/>: CPT

## 2024-10-25 PROCEDURE — G0463 HOSPITAL OUTPT CLINIC VISIT: HCPCS

## 2024-10-25 RX ORDER — NEOMYCIN SULFATE, POLYMYXIN B SULFATE, AND DEXAMETHASONE 3.5; 10000; 1 MG/G; [USP'U]/G; MG/G
0.5 OINTMENT OPHTHALMIC 2 TIMES DAILY
Qty: 3.5 G | Refills: 2 | Status: SHIPPED | OUTPATIENT
Start: 2024-10-25

## 2024-10-25 ASSESSMENT — CONF VISUAL FIELD
METHOD: COUNTING FINGERS
OD_SUPERIOR_TEMPORAL_RESTRICTION: 0
OS_INFERIOR_TEMPORAL_RESTRICTION: 0
OD_INFERIOR_TEMPORAL_RESTRICTION: 0
OS_INFERIOR_NASAL_RESTRICTION: 0
OS_SUPERIOR_TEMPORAL_RESTRICTION: 0
OD_SUPERIOR_NASAL_RESTRICTION: 0
OS_SUPERIOR_NASAL_RESTRICTION: 0
OS_NORMAL: 1
OD_INFERIOR_NASAL_RESTRICTION: 0
OD_NORMAL: 1

## 2024-10-25 ASSESSMENT — REFRACTION_WEARINGRX
OD_CYLINDER: SPHERE
OD_ADD: +2.25
OD_SPHERE: -1.75
OS_ADD: +2.25
OS_SPHERE: -1.25
OS_CYLINDER: SPHERE

## 2024-10-25 ASSESSMENT — CUP TO DISC RATIO
OS_RATIO: 0.4
OD_RATIO: 0.3

## 2024-10-25 ASSESSMENT — REFRACTION_MANIFEST
OD_ADD: +2.50
OS_ADD: +2.50
OS_CYLINDER: SPHERE
OD_CYLINDER: +0.50
OS_AXIS: 020
OD_AXIS: 175
OS_SPHERE: -0.75
OD_SPHERE: -1.25

## 2024-10-25 ASSESSMENT — VISUAL ACUITY
OD_CC: 20/25
CORRECTION_TYPE: GLASSES
OS_CC: 20/25
OD_CC+: -2
METHOD: SNELLEN - LINEAR

## 2024-10-25 ASSESSMENT — TONOMETRY
OS_IOP_MMHG: 12
IOP_METHOD: TONOPEN
OD_IOP_MMHG: 14

## 2024-10-25 ASSESSMENT — SLIT LAMP EXAM - LIDS: COMMENTS: MGD

## 2024-10-25 NOTE — PATIENT INSTRUCTIONS
# Dry eye syndrome both eyes   Recommend starting  preservative free artificial tears both eyes   Recommend starting fish oil suppliments    # chalazion Right eye, lower eye lid  Recommend using maxitrol eye drops twice a day  for 1 week   Recommend good ocular hygiene, warm compresses  Eye lid massages  Start omega three suppliments

## 2024-10-25 NOTE — NURSING NOTE
"Chief Complaints and History of Present Illnesses   Patient presents with    Annual Eye Exam     Chief Complaint(s) and History of Present Illness(es)       Annual Eye Exam               Comments    C/o dryness and a \"stye\" on the left lower lid   No flashes or floaters   No eye pain or tearing     Natalie MAJANO 7:45 AM October 25, 2024                          "

## 2024-10-25 NOTE — PROGRESS NOTES
CC: Dry eye and a stye LLL    HPI: 69 year old     PMHx:   Breast Cancer (2015)  Thyroid nodules  Renal Calculi   Strabismus surgery at 5 years of age       Imaging:  OCT: 10/25/2024  Right eye:   Left eye:     Retina Laser procedures:  none    Intravitreal injections:  none    Assessment/ Plan: 10/25/2024       # Dry eye syndrome both eyes   Recommend starting  preservative free artificial tears both eyes   Recommend starting fish oil suppliments    # chalazion Right eye, lower eye lid  Recommend using maxitrol eye drops BID for 1 week   Recommend good ocular hygiene, warm compresses  Eye lid massages  Start omega three suppliments    # High risk medication use, historical   - Used tamoxifen from 2015 - 2018 with normal OCT Macula in 2019     # Choroidal nevus     # Myopia with astigmatism and presbyopia each eye     # Intermittent Exotropia           Seen by orthoptist for prisms    # History of Recurrent Erosions    - no signs of EBMD  - currently asymptomatic  - observe    Naz Ashraf MD     [facility]  [facility]     Attending Physician Attestation:  Complete documentation of historical and exam elements from today's encounter can be found in the full encounter summary report (not reduplicated in this progress note).  I personally obtained the chief complaint(s) and history of present illness.  I confirmed and edited as necessary the review of systems, past medical/surgical history, family history, social history, and examination findings as documented by others; and I examined the patient myself.  I personally reviewed the relevant tests, images, and reports as documented above.  I formulated and edited as necessary the assessment and plan and discussed the findings and management plan with the patient and family. Naz Ashraf MD

## 2024-10-27 ENCOUNTER — HEALTH MAINTENANCE LETTER (OUTPATIENT)
Age: 69
End: 2024-10-27

## 2025-01-13 ENCOUNTER — NURSE TRIAGE (OUTPATIENT)
Dept: NURSING | Facility: CLINIC | Age: 70
End: 2025-01-13
Payer: COMMERCIAL

## 2025-01-13 NOTE — TELEPHONE ENCOUNTER
"  Nurse Triage SBAR    Is this a 2nd Level Triage? YES, LICENSED PRACTITIONER REVIEW IS REQUIRED    Situation: patient woke up with left eye upper and lower lids swollen and red.      Assessment:   Patient woke up with swelling and redness to left eye, right eye is fine  Pain 1/10  hurts itchy,  mild discomfort with touch or blinking.  White of eye is white  H/o dry eye, seen in  October  Blurry vision is present, but denies double vision  States that reading a book is not a clear as normal  She denies headache today, but did have one yesterday with twinges to left side of head  Denies fall or injury  Denies wood or metal working  Denies confusion or speech concerns  Denies arm or leg weakness  She had been doing warm compresses and massages to both eyes, only left eye is swollen , RIGHT eye is fine.    Protocol Recommended Disposition:   Routing to EYE for a call back    Recommendation:     Routed to provider        Reason for Disposition   Blurred vision or visual changes and present now and sudden onset or new (e.g., minutes, hours, days)  (Exception: Seeing floaters / black specks OR previously diagnosed migraine headaches with same symptoms.)    Answer Assessment - Initial Assessment Questions  1. DESCRIPTION: \"How has your vision changed?\" (e.g., complete vision loss, blurred vision, double vision, floaters, etc.)      Woke up this am with left upper lid swollen and extends down to lower area a well  I  2. LOCATION: \"One or both eyes?\" If one, ask: \"Which eye?\"      Left eye  3. SEVERITY: \"Can you see anything?\" If Yes, ask: \"What can you see?\" (e.g., fine print)      She is able to see the clock, but reading it is not as clear as usual.  No changes to phone  4. ONSET: \"When did this begin?\" \"Did it start suddenly or has this been gradual?\"      This am  5. PATTERN: \"Does this come and go, or has it been constant since it started?\"      constant  6. PAIN: \"Is there any pain in your eye(s)?\"  (Scale 1-10; or " "mild, moderate, severe)      1/10  hurts, itchy, swollen.   Hurts when touched and blinking    7. CONTACTS-GLASSES: \"Do you wear contacts or glasses?\"      glasses  8. CAUSE: \"What do you think is causing this visual problem?\"      Dry eyes?  unknown  9. OTHER SYMPTOMS: \"Do you have any other symptoms?\" (e.g., confusion, headache, arm or leg weakness, speech problems)      Denies confusion or speech problems or arm or leg weakness  Headache  :light: to the top of left eye    10. PREGNANCY: \"Is there any chance you are pregnant?\" \"When was your last menstrual period?\"        no    Protocols used: Vision Loss or Change-A-OH    "

## 2025-01-13 NOTE — TELEPHONE ENCOUNTER
Redness of upper and lower lids with some tenderness/pain times one day    No redness on white part of eye  No vision changes    Scheduled with Dr Presley tomorrow in open return time slot.    Encourage increase use of lubricating eye drops and warm compresses.    Pt seemed comfortable with scheduling/information.    Shade Sprague RN 12:36 PM 01/13/25

## 2025-01-14 ENCOUNTER — OFFICE VISIT (OUTPATIENT)
Dept: OPTOMETRY | Facility: CLINIC | Age: 70
End: 2025-01-14
Payer: COMMERCIAL

## 2025-01-14 DIAGNOSIS — H01.02B SQUAMOUS BLEPHARITIS OF UPPER AND LOWER EYELIDS OF BOTH EYES: ICD-10-CM

## 2025-01-14 DIAGNOSIS — H00.024 HORDEOLUM INTERNUM OF LEFT UPPER EYELID: Primary | ICD-10-CM

## 2025-01-14 DIAGNOSIS — H01.02A SQUAMOUS BLEPHARITIS OF UPPER AND LOWER EYELIDS OF BOTH EYES: ICD-10-CM

## 2025-01-14 PROBLEM — Z85.3 PERSONAL HISTORY OF MALIGNANT NEOPLASM OF BREAST: Status: ACTIVE | Noted: 2022-02-07

## 2025-01-14 RX ORDER — NEOMYCIN SULFATE, POLYMYXIN B SULFATE, AND DEXAMETHASONE 3.5; 10000; 1 MG/G; [USP'U]/G; MG/G
0.5 OINTMENT OPHTHALMIC 2 TIMES DAILY PRN
Qty: 3.5 G | Refills: 1 | Status: SHIPPED | OUTPATIENT
Start: 2025-01-14

## 2025-01-14 ASSESSMENT — VISUAL ACUITY
OS_CC: 20/25
OD_CC+: -2
OS_CC+: -3
CORRECTION_TYPE: GLASSES
METHOD: SNELLEN - LINEAR
OD_CC: 20/20

## 2025-01-14 ASSESSMENT — TONOMETRY
OD_IOP_MMHG: 15
OS_IOP_MMHG: 13
IOP_METHOD: ICARE

## 2025-01-14 ASSESSMENT — REFRACTION_WEARINGRX
OS_SPHERE: -0.75
OS_AXIS: 020
OD_ADD: +2.50
OS_CYLINDER: SPHERE
OD_SPHERE: -1.25
OD_CYLINDER: +0.50
OS_ADD: +2.50
OD_AXIS: 175

## 2025-01-14 NOTE — PATIENT INSTRUCTIONS
-Start warm compresses several times a day for the next several days to help break up the stye. You may use these ongoing for prevention/maintenance    -Start ointment 1-2x/day on the left lid to help with irritation. You may stop this one your eyelids feel better    -Continue artificial tears as needed for eye comfort    -------    -Consider lid scrubs and regular warm compresses to prevent this    -Consider 1-2 months of low dose doxycycline to control meibomian gland dysfunction/blepharitis    -------    Lid scrubs: These can help prevent buildup of debris on eyelids/eyelashes and help reduce inflammation of the eyelids (blepharitis). Use daily.  -Ocusoft Plus lid wipes  -Ocusoft Plus Hypochlor foaming lid cleanser  -Systane lid wipes  -Tranquileyes 1% Tea Tree Eyelid & Facial Cleanser by [employer]  -Avenova Lid   -Cliradex Lid Wipes  -We Love Eyes Foaming Tea Tree Cleanser  -Oasis-LID  N LASH pads.       Warm compresses: Use 1-2x/day for 5-10 minutes over closed eyelids  -Kali mask  -Tranquileyes beaded mask  -Mibo heating pad  -[place] REST & RELIEF Eye Mask (Hot or Cold)  -I-RELIEF Therapy Mask  -OcuTherm Essentials Kit    You can also use a warm wet washcloth - however this frequently loses heat quickly and can dry your skin out a bit so we recommend any of the above re-usable beaded/gel eyemasks      To purchase these products you can look over-the-counter at [employer] or purchase online at the following websites:  -[email or web address]  -[email or web address]

## 2025-01-14 NOTE — NURSING NOTE
"Chief Complaints and History of Present Illnesses   Patient presents with    Consult For     Pt here for left eye lid swelling.     Chief Complaint(s) and History of Present Illness(es)       Consult For              Laterality: left eye    Comments: Pt here for left eye lid swelling.              Comments    Pt notes redness and tenderness of both upper and lower lids left eye (top>bottom). Symptoms x2 days- woke up with it yesterday morning but could \"feel it the night before). Pt notes she had a chalazion RLL in October.     MELECIO Slade on 1/14/2025 at 8:34 AM                     "

## 2025-01-14 NOTE — PROGRESS NOTES
A/P  1.) Horedeolum left eyelid with blepharitis OU  -New onset hordeolum left eye x several days. Had right eye episode this past fall  -Currently using wet washcloth - switch to microwaveable mask and increase frequency for next 1-2 weeks until resolved  -Add Maxitrol ointment to left eyelids for irritation  -Rec ongoing lid maintenance with warm compress and lid scrubs  -Option for short course of doxy if issue becomes more chronic. Hold for now  -AT prn for comfort    Follow-up prn if symptoms worsen or do not improve    I have confirmed the patient's CC, HPI and reviewed Past Medical History, Past Surgical History, Social History, Family History, Problem List, Medication List and agree with Tech note.     Licha Presley, KALPESH STRICKLANDO FAYS

## 2025-04-17 ENCOUNTER — NURSE TRIAGE (OUTPATIENT)
Dept: NURSING | Facility: CLINIC | Age: 70
End: 2025-04-17
Payer: COMMERCIAL

## 2025-04-17 NOTE — TELEPHONE ENCOUNTER
[phone] home/mobile     Called times two at [phone] and left message with direct number.    Shade Sprague RN 11:23 AM 04/17/25

## 2025-04-17 NOTE — TELEPHONE ENCOUNTER
Called and left message at 1300    Reviewed Dr. Presley comfortable also with pt using Isabela 128 eye drops 1-2 times a day    Reviewed over the counter also and available in generic sodium chloride.    Reviewed ok to try also per Dr. Presley's recommendations.    Provided direct number for any further assistance.    Shade Sprague RN 1:03 PM 04/17/25

## 2025-04-17 NOTE — TELEPHONE ENCOUNTER
Nurse Triage SBAR    Is this a 2nd Level Triage? YES, LICENSED PRACTITIONER REVIEW IS REQUIRED    Situation: left eye pain  Would like an appt to make sure nothing serious going on    Background: pt of Naz Martinez MD Ophthalmology   Licha Presley, [facility]      Assessment:  Left eye pain  Intermittent, mostly in the AM, seem to decrease as day goes on   Hx Dry eye, for 6 months  Last few weeks, in the AM - left eye - stings,  # 3  Getting little headache from the pain.    Would like an appt to make sure nothing serious going on    Have been using refresh gtts and warm cloth to eye, and does not seem to help    Vision: no changes  Drainage: none  Redness- little  red  on sclera      Protocol Recommended Disposition:   See in Office Today    Recommendation: High priority note to the eye clinic for call back to pt w/ plan.  Pt # [phone]      Reason for Disposition   Patient wants to be seen    Additional Information   Negative: SEVERE eye pain   Negative: Complete loss of vision in one or both eyes   Negative: Eyelids are very swollen (shut or almost) and fever   Negative: Eyelid (outer) is very red and fever   Negative: Foreign body sensation ('feels like something is in there') and irrigation didn't help   Negative: Vomiting   Negative: Ulcer or sore seen on the cornea (clear center part of the eye)   Negative: Recent eye surgery and increasing eye pain   Negative: Blurred vision AND new-onset or getting worse   Negative: Patient sounds very sick or weak to the triager   Negative: MODERATE eye pain or discomfort (e.g., interferes with normal activities or awakens from sleep; more than mild)   Negative: Looking at light causes MODERATE to SEVERE eye pain (i.e., photophobia)   Negative: Eyelids are very swollen (shut or almost) and no fever   Negative: Painful rash near eye and multiple small blisters grouped together   Negative: Pain followed bright light exposure from welding    Answer  "Assessment - Initial Assessment Questions  1. ONSET: \"When did the pain start?\" (e.g., minutes, hours, days)      Left eye pain  Intermittent, mostly in the AM, seem to decrease as day goes on   Hx Dry eye, for 6 months  Last few weeks, in the AM - left eye - stings,  # 3  Getting little headache from the pain.    Would like an appt to make sure nothing serious going on    Have been using refresh gtts and warm cloth to eye, and does not seem to help    Vision: no changes  Drainage: none  Redness- little  red  on sclera          2. TIMING: \"Does the pain come and go, or has it been constant since it started?\" (e.g., constant, intermittent, fleeting)      intermittent  3. SEVERITY: \"How bad is the pain?\"  (Scale 1-10; mild, moderate or severe)      mild  4. LOCATION: \"Where does it hurt?\"  (e.g., eyelid, eye, cheekbone)      Left eye  5. CAUSE: \"What do you think is causing the pain?\"      ? Dry eye  6. VISION: \"Do you have blurred vision or changes in your vision?\"       No changes  7. EYE DISCHARGE: \"Is there any discharge (pus) from the eye(s)?\"  If Yes, ask: \"What color is it?\"       No    8. FEVER: \"Do you have a fever?\" If Yes, ask: \"What is it, how was it measured, and when did it start?\"       no  9. OTHER SYMPTOMS: \"Do you have any other symptoms?\" (e.g., headache, nasal discharge, facial rash)      No  Little headache from eye pain  10. PREGNANCY: \"Is there any chance you are pregnant?\" \"When was your last menstrual period?\"    Protocols used: Eye Pain and Other Symptoms-A-OH    "

## 2025-04-17 NOTE — TELEPHONE ENCOUNTER
Spoke to pt at 1153    Pain in AM in left eye after waking up that improves during day    Pt using lubricating eye drops 2/day.    Reviewed ok to increase refresh drops to 4/day and start Refresh PM/Systane nighttime ointment at night before bed. Also continue warm compresses.    Reviewed scheduling future appt vs trying recommendations and if not improving by next week to contact clinic.    Pt will try tears/ointment and contact clinic if still reoccurring next week.    Shade Sprague RN 11:59 AM 04/17/25

## 2025-05-07 ENCOUNTER — TELEPHONE (OUTPATIENT)
Dept: OPHTHALMOLOGY | Facility: CLINIC | Age: 70
End: 2025-05-07
Payer: COMMERCIAL

## 2025-05-07 NOTE — TELEPHONE ENCOUNTER
I spoke to the patient who notes she has been following at [facility] in [place] for her ongoing eye problems which worsened while visiting [place].    She notes she is being treated with steroid and antibiotic drops for her Left eye.  The cornea specialist, Dr. Celio Deshpande, has asked that she see a cornea provider on return to [place].  Appointment made.

## 2025-05-22 ENCOUNTER — OFFICE VISIT (OUTPATIENT)
Dept: OPHTHALMOLOGY | Facility: CLINIC | Age: 70
End: 2025-05-22
Payer: COMMERCIAL

## 2025-05-22 ENCOUNTER — TELEPHONE (OUTPATIENT)
Dept: OPTOMETRY | Facility: CLINIC | Age: 70
End: 2025-05-22

## 2025-05-22 DIAGNOSIS — H10.31 ACUTE CONJUNCTIVITIS OF RIGHT EYE, UNSPECIFIED ACUTE CONJUNCTIVITIS TYPE: Primary | ICD-10-CM

## 2025-05-22 RX ORDER — NEOMYCIN SULFATE, POLYMYXIN B SULFATE AND DEXAMETHASONE 3.5; 10000; 1 MG/ML; [USP'U]/ML; MG/ML
1 SUSPENSION/ DROPS OPHTHALMIC 3 TIMES DAILY
Qty: 5 ML | Refills: 1 | Status: SHIPPED | OUTPATIENT
Start: 2025-05-22

## 2025-05-22 ASSESSMENT — VISUAL ACUITY
OS_CC: 20/40
CORRECTION_TYPE: GLASSES
OD_CC+: -1
METHOD: SNELLEN - LINEAR
OD_CC: 20/25
OS_CC+: +1

## 2025-05-22 ASSESSMENT — TONOMETRY
IOP_METHOD: ICARE
OD_IOP_MMHG: 11
OS_IOP_MMHG: 11

## 2025-05-22 ASSESSMENT — REFRACTION_WEARINGRX
OD_SPHERE: -1.25
OD_AXIS: 175
OS_SPHERE: -0.75
OS_CYLINDER: SPHERE
OS_ADD: +2.50
OD_CYLINDER: +0.50
OS_AXIS: 020
OD_ADD: +2.50

## 2025-05-22 NOTE — NURSING NOTE
Chief Complaints and History of Present Illnesses   Patient presents with    Follow Up     Here or redness/swelling     Chief Complaint(s) and History of Present Illness(es)       Follow Up              Associated symptoms: dryness, eye pain, redness and swelling.  Negative for photophobia    Treatments tried: eye drops, artificial tears, ointment and warm compresses    Pain scale: 2/10    Comments: Here or redness/swelling              Comments    Woke this morning with redness and swelling of right eye   Constant dryness daily each eye   Aching right eye pain (2/10)    Occasional blurry vision each morning.     Ocular Meds:   Systane AT 4-5 times daily each eye   Systane rei qPM left eye   Restasis BID each eye   Warm Compress BID each eye     Ben Ho 2:08 PM May 22, 2025

## 2025-05-22 NOTE — TELEPHONE ENCOUNTER
Spoke to pt and reviewed sytmptoms and pt about 30 minutes from clinic    Offered open return time slot with Dr. Presley at 1340 and pt confirmed appt and aware at [facility] location.    Shade Sprague RN 1:11 PM 05/22/25

## 2025-05-22 NOTE — PATIENT INSTRUCTIONS
Right eye:  -Maxitrol (danette-poly-dex) drops 3-4x/day for 2 days, then drop to 2-3x/day for 5 days  -Continue all other drops    Left eye:  -Isabela 128 (5% sodium chloride) drop or ointment 1-2x/day to help reduce erosions  -PTK vs SK (phototherapeutic keratectomy vs superficial keratectomy)

## 2025-05-22 NOTE — TELEPHONE ENCOUNTER
Health Call Center    Phone Message    May a detailed message be left on voicemail: yes     Reason for Call: Other: Patient called stating symptoms of Right eye redness and discharge - under eye is puffy. Patient is inquiring if needs to be seen or just needs prescription or advice. Patient is scheduled for 6/9/2025 with Dr. Lake. Please call patient. Sending HP due to new symptoms of different eye to previous visit. Thank you.    Action Taken: Other: [facility]    Travel Screening: Not Applicable     Date of Service:

## 2025-06-11 ENCOUNTER — OFFICE VISIT (OUTPATIENT)
Dept: OPHTHALMOLOGY | Facility: CLINIC | Age: 70
End: 2025-06-11
Attending: OPHTHALMOLOGY
Payer: COMMERCIAL

## 2025-06-11 DIAGNOSIS — H18.833 RECURRENT EROSION OF BOTH CORNEAS: Primary | ICD-10-CM

## 2025-06-11 PROCEDURE — 65435 CURETTE/TREAT CORNEA: CPT | Mod: LT | Performed by: OPHTHALMOLOGY

## 2025-06-11 RX ORDER — MOXIFLOXACIN 5 MG/ML
1 SOLUTION/ DROPS OPHTHALMIC 3 TIMES DAILY
Qty: 3 ML | Refills: 11 | Status: SHIPPED | OUTPATIENT
Start: 2025-06-11

## 2025-06-11 RX ORDER — PREDNISOLONE ACETATE 10 MG/ML
1 SUSPENSION/ DROPS OPHTHALMIC 4 TIMES DAILY
Qty: 10 ML | Refills: 0 | Status: SHIPPED | OUTPATIENT
Start: 2025-06-11

## 2025-06-18 ENCOUNTER — OFFICE VISIT (OUTPATIENT)
Dept: OPHTHALMOLOGY | Facility: CLINIC | Age: 70
End: 2025-06-18
Attending: OPHTHALMOLOGY
Payer: COMMERCIAL

## 2025-06-18 DIAGNOSIS — H18.833 RECURRENT EROSION OF BOTH CORNEAS: Primary | ICD-10-CM

## 2025-06-18 DIAGNOSIS — H04.123 DRY EYE SYNDROME OF BOTH EYES: ICD-10-CM

## 2025-06-18 PROCEDURE — 99213 OFFICE O/P EST LOW 20 MIN: CPT | Performed by: OPHTHALMOLOGY

## 2025-06-18 PROCEDURE — G0463 HOSPITAL OUTPT CLINIC VISIT: HCPCS | Performed by: OPHTHALMOLOGY

## 2025-06-18 ASSESSMENT — TONOMETRY
OD_IOP_MMHG: 13
IOP_METHOD: ICARE
OS_IOP_MMHG: 11

## 2025-06-18 ASSESSMENT — REFRACTION_WEARINGRX
OD_ADD: +2.50
OD_AXIS: 175
OD_SPHERE: -1.25
OS_SPHERE: -0.75
OD_CYLINDER: +0.50
OS_ADD: +2.50
OS_CYLINDER: SPHERE
OS_AXIS: 020

## 2025-06-18 ASSESSMENT — EXTERNAL EXAM - LEFT EYE: OS_EXAM: NORMAL

## 2025-06-18 ASSESSMENT — VISUAL ACUITY
OD_CC: 20/20
OS_CC: 20/25
METHOD: SNELLEN - LINEAR

## 2025-06-18 ASSESSMENT — SLIT LAMP EXAM - LIDS
COMMENTS: NORMAL
COMMENTS: NORMAL

## 2025-06-18 ASSESSMENT — EXTERNAL EXAM - RIGHT EYE: OD_EXAM: NORMAL

## 2025-06-18 NOTE — PROGRESS NOTES
Chief complaint   Corneal erosion?    HPI    Namita Hernandez 70 year old female       Chief Complaint(s) and History of Present Illness(es)       Corneal Evaluation    Since onset it is stable.  Associated symptoms include dryness (more late at night or early morning).  Negative for eye pain.  Treatments tried include artificial tears and ointment.             Comments    Pt has been seen several at [facility] in [place]. Pt is not having much pain currently. Pt reports that her eyes get stuck and when she opens her eyes it feels like her cornea is tearing. Since starting NPD last Friday, it hasn't happened for the past 3 nights.    Pt reports a reaction to the fluorescein in her right eye - chemosis. It was about 10 days ago.     Lakeisha L Faulkner OA 8:42 AM May 9, 2025                     Interval hx 06/18/2025  Chief Complaint(s) and History of Present Illness(es)       Recurrent Erosion Follow Up    In both eyes.  This started 1 week ago.  Since onset it is gradually improving.  Associated symptoms include Negative for dryness, photophobia, flashes, floaters, foreign body sensation and burning.  Treatments tried include eye drops and artificial tears.  Response to treatment was moderate improvement.  Pain was noted as 0/10. Additional comments: 1 week follow              Comments    Current ocular medications:  Prednisolone acetate QID OS  moxifloxacin TID right eye  Restasis BID each eye  Artificial tears each eye   BCL left eye    Jessie JACQUES 9:33 AM June 18, 2025                         Past ocular history   Prior eye surgery/laser/Trauma: -  CTL wearer:No  Glasses : --  Family Hx of eye disease: -    PMH     Past Medical History:   Diagnosis Date    Breast cancer (H) 4/2015    Colon polyps     Hypovitaminosis D     Kidney stone 2010    Multiple thyroid nodules 2015    right dominant 1.3 cm; benign cytology 5/15    Osteoporosis 7/15    lowest T-score -3.2 33% radius    Pancreatic divisum     Recurrent  UTI     since menopause (age 47) 3 x per year       PSH     Past Surgical History:   Procedure Laterality Date    DILATION AND CURETTAGE, OPERATIVE HYSTEROSCOPY WITH MORCELLATOR, COMBINED N/A 12/18/2019    Procedure: HYSTEROSCOPY DILATION AND CURRETAGE WITH MYOSURE;  Surgeon: Sushila Melendez MD;  Location: [facility] OR    EXCISION BENIGN LESION COMPL Right 1997    Abdominal lipoma removed     EYE SURGERY  1960    strabismus    LUMPECTOMY BREAST WITH SENTINEL NODE, COMBINED Right 5/11/2015    Procedure: COMBINED LUMPECTOMY BREAST WITH SENTINEL NODE;  Surgeon: Yoshi Oconnor MD;  Location:  OR       [facility]     Current Outpatient Medications   Medication Sig Dispense Refill    CALCIUM PO Take 2-4 tablets by mouth daily       cycloSPORINE (RESTASIS) 0.05 % ophthalmic emulsion Place 1 drop into both eyes 2 times daily. 60 each 11    estradiol (ESTRACE) 0.1 MG/GM vaginal cream Place 2 g vaginally four times a week 32 g 3    moxifloxacin (VIGAMOX) 0.5 % ophthalmic solution Place 1 drop into the right eye 3 times daily. 3 mL 11    neomycin-polymyxin-dexAMETHasone (MAXITROL) 3.5-98443-9.1 ophthalmic ointment Place 0.1429 Applications (0.5 g) Into the left eye 2 times daily as needed (For left eyelid irritation). (Patient taking differently: Place 0.5 inches Into the left eye at bedtime.) 3.5 g 1    neomycin-polymyxin-dexAMETHasone (MAXITROL) 3.5-38121-6.1 ophthalmic ointment Place 0.1429 Applications (0.5 g) into the right eye 2 times daily. (Patient not taking: Reported on 5/9/2025) 3.5 g 2    neomycin-polymyxin-dexAMETHasone (MAXITROL) 3.5-69515-1.1 SUSP ophthalmic susp Place 1 drop into the right eye 3 times daily. 5 mL 1    neomycin-polymyxin-dexAMETHasone (MAXITROL) 3.5-40536-5.1 SUSP ophthalmic susp Place 1-2 drops Into the left eye 2 times daily.      prednisoLONE acetate (PRED FORTE) 1 % ophthalmic suspension Place 1 drop Into the left eye 4 times daily. 10 mL 0     No current facility-administered  medications for this visit.       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 Unclear hx of infection. None detected today but a loose epi  S/p Super k   6/11/25  Healed epi no complications    Plan  Stop moxi  Taper pred  Continue AT    Follow up:  Oph: Follow-up 1 month      Attending Physician Attestation:  Complete documentation of historical and exam elements from today's encounter can be found in the full encounter summary report (not reduplicated in this progress note).  I personally obtained the chief complaint(s) and history of present illness.  I confirmed and edited as necessary the review of systems, past medical/surgical history, family history, social history, and examination findings as documented by others; and I examined the patient myself.  I personally reviewed the relevant tests, images, and reports as documented above.  I formulated and edited as necessary the assessment and plan and discussed the findings and management plan with the patient and family. - Monie Joseph MD

## 2025-06-18 NOTE — NURSING NOTE
Chief Complaints and History of Present Illnesses   Patient presents with    Recurrent Erosion Follow Up     1 week follow      Chief Complaint(s) and History of Present Illness(es)       Recurrent Erosion Follow Up              Laterality: both eyes    Onset: 1 week ago    Course: gradually improving    Associated symptoms: Negative for dryness, photophobia, flashes, floaters, foreign body sensation and burning    Treatments tried: eye drops and artificial tears    Response to treatment: moderate improvement    Pain scale: 0/10    Comments: 1 week follow               Comments    Current ocular medications:  Prednisolone acetate QID OS  Moxifloxacin TID right eye  Restasis BID each eye  Artificial tears each eye   BCL left eye    Jessie JACQUES 9:33 AM June 18, 2025

## 2025-07-16 ENCOUNTER — OFFICE VISIT (OUTPATIENT)
Dept: OPHTHALMOLOGY | Facility: CLINIC | Age: 70
End: 2025-07-16
Attending: OPHTHALMOLOGY
Payer: COMMERCIAL

## 2025-07-16 DIAGNOSIS — H04.123 DRY EYE SYNDROME OF BOTH EYES: ICD-10-CM

## 2025-07-16 DIAGNOSIS — H18.833 RECURRENT EROSION OF BOTH CORNEAS: Primary | ICD-10-CM

## 2025-07-16 PROCEDURE — G0463 HOSPITAL OUTPT CLINIC VISIT: HCPCS | Performed by: OPHTHALMOLOGY

## 2025-07-16 ASSESSMENT — REFRACTION_WEARINGRX
OS_AXIS: 020
OD_AXIS: 175
OS_SPHERE: -0.75
OD_SPHERE: -1.25
OS_ADD: +2.50
OD_CYLINDER: +0.50
OD_ADD: +2.50
OS_CYLINDER: SPHERE

## 2025-07-16 ASSESSMENT — VISUAL ACUITY
METHOD: SNELLEN - LINEAR
OS_CC+: +2
CORRECTION_TYPE: GLASSES
OD_CC: 20/20
OS_CC: 20/25
OD_CC+: -1

## 2025-07-16 ASSESSMENT — TONOMETRY
IOP_METHOD: ICARE
OS_IOP_MMHG: 15
OD_IOP_MMHG: 15

## 2025-07-16 ASSESSMENT — EXTERNAL EXAM - RIGHT EYE: OD_EXAM: NORMAL

## 2025-07-16 ASSESSMENT — SLIT LAMP EXAM - LIDS
COMMENTS: NORMAL
COMMENTS: NORMAL

## 2025-07-16 ASSESSMENT — EXTERNAL EXAM - LEFT EYE: OS_EXAM: NORMAL

## 2025-07-16 NOTE — NURSING NOTE
Chief Complaints and History of Present Illnesses   Patient presents with    Follow Up     Recurrent erosion of both corneas     Chief Complaint(s) and History of Present Illness(es)       Follow Up              Laterality: both eyes    Course: stable    Associated symptoms: Negative for glare, dryness, eye pain, flashes, itching and discharge    Treatments tried: eye drops and artificial tears    Pain scale: 0/10    Comments: Recurrent erosion of both corneas              Comments      Current ocular medications:  Restasis BID each eye  Artificial tears each eye     Jessie JACQUES 9:59 AM July 16, 2025

## 2025-07-16 NOTE — PROGRESS NOTES
Chief complaint   Corneal erosion?    HPI    Namita Hernandez 70 year old female       Chief Complaint(s) and History of Present Illness(es)       Corneal Evaluation    Since onset it is stable.  Associated symptoms include dryness (more late at night or early morning).  Negative for eye pain.  Treatments tried include artificial tears and ointment.             Comments    Pt has been seen several at [facility] in [place]. Pt is not having much pain currently. Pt reports that her eyes get stuck and when she opens her eyes it feels like her cornea is tearing. Since starting NPD last Friday, it hasn't happened for the past 3 nights.    Pt reports a reaction to the fluorescein in her right eye - chemosis. It was about 10 days ago.     Lakeisha L Lucie OA 8:42 AM May 9, 2025                       Interval hx 07/16/2025  Chief Complaint(s) and History of Present Illness(es)       Follow Up    In both eyes.  Since onset it is stable.  Associated symptoms include Negative for glare, dryness, eye pain, flashes, itching and discharge.  Treatments tried include eye drops and artificial tears.  Pain was noted as 0/10. Additional comments: Recurrent erosion of both corneas             Comments      Current ocular medications:  Restasis BID each eye  Artificial tears each eye     Jessie JACQUES 9:59 AM July 16, 2025                         Past ocular history   Prior eye surgery/laser/Trauma: -  CTL wearer:No  Glasses : --  Family Hx of eye disease: -    PMH     Past Medical History:   Diagnosis Date    Breast cancer (H) 4/2015    Colon polyps     Hypovitaminosis D     Kidney stone 2010    Multiple thyroid nodules 2015    right dominant 1.3 cm; benign cytology 5/15    Osteoporosis 7/15    lowest T-score -3.2 33% radius    Pancreatic divisum     Recurrent UTI     since menopause (age 47) 3 x per year       PSH     Past Surgical History:   Procedure Laterality Date    DILATION AND CURETTAGE, OPERATIVE HYSTEROSCOPY WITH  MORCELLATOR, COMBINED N/A 12/18/2019    Procedure: HYSTEROSCOPY DILATION AND CURRETAGE WITH MYOSURE;  Surgeon: Sushila Melendez MD;  Location: [facility] OR    EXCISION BENIGN LESION COMPL Right 1997    Abdominal lipoma removed     EYE SURGERY  1960    strabismus    LUMPECTOMY BREAST WITH SENTINEL NODE, COMBINED Right 5/11/2015    Procedure: COMBINED LUMPECTOMY BREAST WITH SENTINEL NODE;  Surgeon: Yoshi Oconnor MD;  Location:  OR       [facility]     Current Outpatient Medications   Medication Sig Dispense Refill    CALCIUM PO Take 2-4 tablets by mouth daily       cycloSPORINE (RESTASIS) 0.05 % ophthalmic emulsion Place 1 drop into both eyes 2 times daily. 60 each 11    estradiol (ESTRACE) 0.1 MG/GM vaginal cream Place 2 g vaginally four times a week 32 g 3    moxifloxacin (VIGAMOX) 0.5 % ophthalmic solution Place 1 drop into the right eye 3 times daily. 3 mL 11    neomycin-polymyxin-dexAMETHasone (MAXITROL) 3.5-89344-8.1 ophthalmic ointment Place 0.1429 Applications (0.5 g) Into the left eye 2 times daily as needed (For left eyelid irritation). (Patient taking differently: Place 0.5 inches Into the left eye at bedtime.) 3.5 g 1    neomycin-polymyxin-dexAMETHasone (MAXITROL) 3.5-57087-0.1 ophthalmic ointment Place 0.1429 Applications (0.5 g) into the right eye 2 times daily. (Patient not taking: Reported on 5/9/2025) 3.5 g 2    neomycin-polymyxin-dexAMETHasone (MAXITROL) 3.5-10722-4.1 SUSP ophthalmic susp Place 1 drop into the right eye 3 times daily. 5 mL 1    neomycin-polymyxin-dexAMETHasone (MAXITROL) 3.5-79471-3.1 SUSP ophthalmic susp Place 1-2 drops Into the left eye 2 times daily.      prednisoLONE acetate (PRED FORTE) 1 % ophthalmic suspension Place 1 drop Into the left eye 4 times daily. 10 mL 0     No current facility-administered medications for this visit.       Labs   -    Imaging   -    Drops Currently Taking   -    Assessment/Plan 07/16/2025   # dry eye syndrome each eye  #recurrent erosion  syndrome OS>right eye    Multiple recurrences in the past weeks. Unclear hx of infection. None detected today but a loose epi  S/p Super k   6/11/25  Healed epi no complications    Plan  Start xiidra BID and stop restasis  Start Miebo Drop , to be started 2 months after using xiidra  Continue AT    Follow up:  Oph: 6 months VT      Attending Physician Attestation:  Complete documentation of historical and exam elements from today's encounter can be found in the full encounter summary report (not reduplicated in this progress note).  I personally obtained the chief complaint(s) and history of present illness.  I confirmed and edited as necessary the review of systems, past medical/surgical history, family history, social history, and examination findings as documented by others; and I examined the patient myself.  I personally reviewed the relevant tests, images, and reports as documented above.  I formulated and edited as necessary the assessment and plan and discussed the findings and management plan with the patient and family. - Monie Joseph MD

## (undated) DEVICE — SOL WATER IRRIG 1000ML BOTTLE 2F7114

## (undated) DEVICE — SEAL SET MYOSURE ROD LENS SCOPE SINGLE USE 40-902

## (undated) DEVICE — GOWN XXLG REINFORCED 9071EL

## (undated) DEVICE — SOL NACL 0.9% INJ 1000ML BAG 2B1324X

## (undated) DEVICE — LINEN TOWEL PACK X5 5464

## (undated) DEVICE — SUCTION CANISTER MEDIVAC LINER 3000ML W/LID 65651-530

## (undated) DEVICE — Device

## (undated) DEVICE — GLOVE PROTEXIS W/NEU-THERA 7.5  2D73TE75

## (undated) DEVICE — PACK TVT HYSTEROSCOPY SMA15HYFSE

## (undated) DEVICE — SUCTION CANISTER BEMIS HI FLOW 006772-901

## (undated) RX ORDER — KETOROLAC TROMETHAMINE 30 MG/ML
INJECTION, SOLUTION INTRAMUSCULAR; INTRAVENOUS
Status: DISPENSED
Start: 2019-12-18

## (undated) RX ORDER — ONDANSETRON 2 MG/ML
INJECTION INTRAMUSCULAR; INTRAVENOUS
Status: DISPENSED
Start: 2019-12-18

## (undated) RX ORDER — FENTANYL CITRATE 50 UG/ML
INJECTION, SOLUTION INTRAMUSCULAR; INTRAVENOUS
Status: DISPENSED
Start: 2019-12-18

## (undated) RX ORDER — OXYCODONE HYDROCHLORIDE 5 MG/1
TABLET ORAL
Status: DISPENSED
Start: 2019-12-18

## (undated) RX ORDER — DEXAMETHASONE SODIUM PHOSPHATE 4 MG/ML
INJECTION, SOLUTION INTRA-ARTICULAR; INTRALESIONAL; INTRAMUSCULAR; INTRAVENOUS; SOFT TISSUE
Status: DISPENSED
Start: 2019-12-18

## (undated) RX ORDER — ACETAMINOPHEN 325 MG/1
TABLET ORAL
Status: DISPENSED
Start: 2019-12-18